# Patient Record
Sex: FEMALE | Race: WHITE | Employment: OTHER | URBAN - METROPOLITAN AREA
[De-identification: names, ages, dates, MRNs, and addresses within clinical notes are randomized per-mention and may not be internally consistent; named-entity substitution may affect disease eponyms.]

---

## 2017-07-17 PROBLEM — I50.22 CHRONIC SYSTOLIC CONGESTIVE HEART FAILURE (HCC): Status: ACTIVE | Noted: 2017-07-17

## 2017-07-17 PROBLEM — I48.0 PAROXYSMAL ATRIAL FIBRILLATION (HCC): Status: ACTIVE | Noted: 2017-07-17

## 2018-01-08 PROBLEM — R06.02 SOB (SHORTNESS OF BREATH): Status: ACTIVE | Noted: 2018-01-08

## 2018-03-20 PROBLEM — I27.20 PULMONARY HYPERTENSION (HCC): Status: ACTIVE | Noted: 2018-03-20

## 2018-03-20 PROBLEM — R60.0 BILATERAL LOWER EXTREMITY EDEMA: Status: ACTIVE | Noted: 2018-03-20

## 2018-07-24 PROBLEM — N18.30 CKD (CHRONIC KIDNEY DISEASE), STAGE III (HCC): Status: ACTIVE | Noted: 2018-07-24

## 2019-01-15 PROBLEM — I10 ESSENTIAL HYPERTENSION: Status: ACTIVE | Noted: 2019-01-15

## 2020-08-02 ENCOUNTER — HOSPITAL ENCOUNTER (EMERGENCY)
Age: 80
Discharge: HOME | End: 2020-08-02
Attending: EMERGENCY MEDICINE
Payer: MEDICARE

## 2020-08-02 VITALS
WEIGHT: 98 LBS | HEART RATE: 105 BPM | DIASTOLIC BLOOD PRESSURE: 96 MMHG | BODY MASS INDEX: 19.76 KG/M2 | HEIGHT: 59 IN | OXYGEN SATURATION: 97 % | TEMPERATURE: 99 F | RESPIRATION RATE: 16 BRPM | SYSTOLIC BLOOD PRESSURE: 155 MMHG

## 2020-08-02 LAB
ABSOLUTE EOS #: 0 K/UL (ref 0–0.4)
ABSOLUTE IMMATURE GRANULOCYTE: ABNORMAL K/UL (ref 0–0.3)
ABSOLUTE LYMPH #: 1.3 K/UL (ref 1–4.8)
ABSOLUTE MONO #: 0.7 K/UL (ref 0.1–1.2)
ANION GAP SERPL CALCULATED.3IONS-SCNC: 15 MMOL/L (ref 9–17)
BASOPHILS # BLD: 1 % (ref 0–2)
BASOPHILS ABSOLUTE: 0 K/UL (ref 0–0.2)
BUN BLDV-MCNC: 36 MG/DL (ref 8–23)
BUN/CREAT BLD: ABNORMAL (ref 9–20)
CALCIUM SERPL-MCNC: 9.6 MG/DL (ref 8.6–10.4)
CHLORIDE BLD-SCNC: 95 MMOL/L (ref 98–107)
CO2: 25 MMOL/L (ref 20–31)
CREAT SERPL-MCNC: 1.35 MG/DL (ref 0.5–0.9)
DIFFERENTIAL TYPE: ABNORMAL
EOSINOPHILS RELATIVE PERCENT: 0 % (ref 1–4)
GFR AFRICAN AMERICAN: 46 ML/MIN
GFR NON-AFRICAN AMERICAN: 38 ML/MIN
GFR SERPL CREATININE-BSD FRML MDRD: ABNORMAL ML/MIN/{1.73_M2}
GFR SERPL CREATININE-BSD FRML MDRD: ABNORMAL ML/MIN/{1.73_M2}
GLUCOSE BLD-MCNC: 101 MG/DL (ref 70–99)
HCT VFR BLD CALC: 43.6 % (ref 36–46)
HEMOGLOBIN: 14.4 G/DL (ref 12–16)
IMMATURE GRANULOCYTES: ABNORMAL %
LYMPHOCYTES # BLD: 25 % (ref 24–44)
MCH RBC QN AUTO: 29 PG (ref 26–34)
MCHC RBC AUTO-ENTMCNC: 32.9 G/DL (ref 31–37)
MCV RBC AUTO: 88.1 FL (ref 80–100)
MONOCYTES # BLD: 13 % (ref 2–11)
NRBC AUTOMATED: ABNORMAL PER 100 WBC
PDW BLD-RTO: 14.5 % (ref 12.5–15.4)
PLATELET # BLD: 132 K/UL (ref 140–450)
PLATELET ESTIMATE: ABNORMAL
PMV BLD AUTO: 9.4 FL (ref 6–12)
POTASSIUM SERPL-SCNC: 4.7 MMOL/L (ref 3.7–5.3)
RBC # BLD: 4.95 M/UL (ref 4–5.2)
RBC # BLD: ABNORMAL 10*6/UL
SEG NEUTROPHILS: 61 % (ref 36–66)
SEGMENTED NEUTROPHILS ABSOLUTE COUNT: 3.3 K/UL (ref 1.8–7.7)
SODIUM BLD-SCNC: 135 MMOL/L (ref 135–144)
WBC # BLD: 5.3 K/UL (ref 3.5–11)
WBC # BLD: ABNORMAL 10*3/UL

## 2020-08-02 PROCEDURE — 80048 BASIC METABOLIC PNL TOTAL CA: CPT

## 2020-08-02 PROCEDURE — 85025 COMPLETE CBC W/AUTO DIFF WBC: CPT

## 2020-08-02 PROCEDURE — 36415 COLL VENOUS BLD VENIPUNCTURE: CPT

## 2020-08-02 PROCEDURE — 6370000000 HC RX 637 (ALT 250 FOR IP): Performed by: PHYSICIAN ASSISTANT

## 2020-08-02 PROCEDURE — 99283 EMERGENCY DEPT VISIT LOW MDM: CPT

## 2020-08-02 RX ORDER — VALACYCLOVIR HYDROCHLORIDE 1 G/1
1000 TABLET, FILM COATED ORAL 3 TIMES DAILY
Qty: 30 TABLET | Refills: 0 | Status: SHIPPED | OUTPATIENT
Start: 2020-08-02 | End: 2020-08-12

## 2020-08-02 RX ORDER — ACYCLOVIR 200 MG/1
800 CAPSULE ORAL ONCE
Status: COMPLETED | OUTPATIENT
Start: 2020-08-02 | End: 2020-08-02

## 2020-08-02 RX ORDER — PREDNISONE 50 MG/1
50 TABLET ORAL DAILY
Qty: 5 TABLET | Refills: 0 | Status: SHIPPED | OUTPATIENT
Start: 2020-08-02 | End: 2020-08-07

## 2020-08-02 RX ADMIN — ACYCLOVIR 800 MG: 200 CAPSULE ORAL at 17:16

## 2020-08-02 ASSESSMENT — PAIN SCALES - GENERAL: PAINLEVEL_OUTOF10: 5

## 2020-08-02 ASSESSMENT — PAIN DESCRIPTION - LOCATION: LOCATION: FACE

## 2020-08-02 ASSESSMENT — PAIN DESCRIPTION - ORIENTATION: ORIENTATION: LEFT

## 2020-08-02 ASSESSMENT — PAIN DESCRIPTION - PAIN TYPE: TYPE: ACUTE PAIN

## 2020-08-02 NOTE — ED PROVIDER NOTES
[address] [facility]  [address] [facility] [address]  Phone: [phone]  Fax: [phone]      eMERGENCY dEPARTMENT eNCOUnter      Pt Name: Jennifer Pride  MRN: [id number]  [place] 1940  Date of evaluation: 8/2/20      CHIEF COMPLAINT:  Chief Complaint   Patient presents with    Facial Swelling     Left sided facial redness, swelling, discomfort x a few days. Using neosporin topically. Some blistering and seeping of serous fluid.  Cellulitis    Herpes Zoster       HISTORY OF PRESENT ILLNESS    Jennifer Pride is a 78 y.o. female who presents with rash complaint:    Location/Symptom:   Rash on face  Timing/Onset:   3 days  Context/Setting:  -pt here with son for rash/swelling/redness and pain of left chin/face and now with some left ear bleeding/pain. Rash started on left lower chin, painful and blistered. No other feelings in this area prior to onset of rash/pain. She does report having a bad tooth on this side that has been hurting as well. No recorded fevers/chills/neck pain/eye pain or visual changes. Some muffled hearing intermittently with ear symptoms onset. Quality:   pain  Duration:   constant  Modifying Factors:   none  Severity:   moderate    Nursing Notes were reviewed. REVIEW OF SYSTEMS       Constitutional: Denies recent fever, chills. Eyes: No eye pain. No vision changes. Neck: No neck pain. Respiratory: Denies recent shortness of breath. Cardiac:  Denies recent chest pain. GI:  Denies abdominal pain/nausea/vomiting/diarrhea. : Denies dysuria. Musculoskeletal: Denies focal weakness. Neurologic: denies headache or focal weakness. Skin:  Rash     Negative in 10 essential Systems except as mentioned above and in the HPI. PAST MEDICAL HISTORY   PMH:  has a past medical history of Chronic kidney disease, Gout, Gout, and Hypertension.   Surgical History:  has a past surgical history that includes Coronary artery bypass graft and Cataract (*)     Monocytes 13 (*)     Eosinophils % 0 (*)     All other components within normal limits   BASIC METABOLIC PANEL - Abnormal; Notable for the following components:    Glucose 101 (*)     BUN 36 (*)     CREATININE 1.35 (*)     Chloride 95 (*)     GFR Non- 38 (*)     GFR  46 (*)     All other components within normal limits         EMERGENCY DEPARTMENT COURSE:     1633  Suspicious for Shingles, will check basic labs to r/o any other 2* infection. Afebrile, nontoxic, no eye complaints or other airway/oral findings. Discussed with attending. No visual complaints or clinical findings. 94 20 56  Attending discharged this patient after discussing all labwork/imaging results that were finalized. Treatment plan and recommended follow-up discussed with them as well. Providing Valtrex, Prednisone and he asked for Cortisporin for the ear symptoms. I reiterated all this with pt/son and confirmed she has PCP for close outpt f/u in a few days. Return to ED for worsening symptoms, fevers/chills/eye pain or visual symptoms/nausea/vomiting/HA. Orders Placed This Encounter   Medications    acyclovir (ZOVIRAX) capsule 800 mg    valACYclovir (VALTREX) 1 g tablet     Sig: Take 1 tablet by mouth 3 times daily for 10 days     Dispense:  30 tablet     Refill:  0    neomycin-polymyxin-hydrocortisone (CORTISPORIN) 3.5-74999-9 otic solution     Sig: Place 4 drops into the left ear 3 times daily for 7 days Instill into left Ear     Dispense:  1 each     Refill:  0    predniSONE (DELTASONE) 50 MG tablet     Sig: Take 1 tablet by mouth daily for 5 days     Dispense:  5 tablet     Refill:  0       CONSULTS:  None      FINAL IMPRESSION      1. Herpes zoster without complication    2.  Acute otitis externa of left ear, unspecified type          DISPOSITION/PLAN:  DISPOSITION Decision To Discharge 08/02/2020 05:27:10 PM        PATIENT REFERRED TO:  MD Herberth Ortiz [facility] [address] [id number]  [phone]    Schedule an appointment as soon as possible for a visit in 2 days  for re-evaluation of your symptoms    [facility] ED  [address]   [address] [address]  [phone]  Schedule an appointment as soon as possible for a visit   for worsening of your symptoms      DISCHARGE MEDICATIONS:  New Prescriptions    NEOMYCIN-POLYMYXIN-HYDROCORTISONE (CORTISPORIN) 3.5-68711-7 OTIC SOLUTION    Place 4 drops into the left ear 3 times daily for 7 days Instill into left Ear    PREDNISONE (DELTASONE) 50 MG TABLET    Take 1 tablet by mouth daily for 5 days    VALACYCLOVIR (VALTREX) 1 G TABLET    Take 1 tablet by mouth 3 times daily for 10 days       (Please note that portions of this note were completed with a voice recognition program.  Efforts were made to edit the dictations but occasionally words are mis-transcribed.)    KISHA Mark PA-C  08/02/20 2187

## 2020-08-02 NOTE — ED PROVIDER NOTES
[address] [facility] ED  [address] [facility] [address]  Phone: [phone]  Fax: [phone]       Attending Physician Attestation     I performed a history and physical examination of the patient and discussed management with the mid level provideer. I reviewed the mid level provider's note and agree with the documented findings and plan of care. Any areas of disagreement are noted on the chart. I was personally present for the key portions of any procedures. I have documented in the chart those procedures where I was not present during the key portions. I have reviewed the emergency nurses triage note. I agree with the chief complaint, past medical history, past surgical history, allergies, medications, social and family history as documented unless otherwise noted below. Documentation of the HPI, Physical Exam and Medical Decision Making performed by medical students or scribes is based on my personal performance of the HPI, PE and MDM. For Physician Assistant/ Nurse Practitioner cases/documentation I have personally evaluated this patient and have completed at least one if not all key elements of the E/M (history, physical exam, and MDM). Additional findings are as noted. Primary Care Physician:  Mayte Martinez MD    CHIEF COMPLAINT       Chief Complaint   Patient presents with    Facial Swelling     Left sided facial redness, swelling, discomfort x a few days. Using neosporin topically. Some blistering and seeping of serous fluid.     Cellulitis    Herpes Zoster       RECENT VITALS:   Temp: 99 °F (37.2 °C),  Pulse: 112, Resp: 16, BP: (!) 158/104    LABS:  Labs Reviewed   CBC WITH AUTO DIFFERENTIAL - Abnormal; Notable for the following components:       Result Value    Platelets 706 (*)     Monocytes 13 (*)     Eosinophils % 0 (*)     All other components within normal limits   BASIC METABOLIC PANEL - Abnormal; Notable for the following components:    Glucose 101 (*)     BUN 36 (*) CREATININE 1.35 (*)     Chloride 95 (*)     GFR Non- 38 (*)     GFR  46 (*)     All other components within normal limits         PERTINENT ATTENDING PHYSICIAN COMMENTS:    Cindi Newberry is a 78 y.o. female who presents with a painful rash of the left face over the past 3 days. The string present. The left EAC appears affected with some discharge noted. The TM appears normal.  There is no eye involvement. He is treated with acyclovir and steroids and given pain medication and will follow-up with her primary care physician as directed. Inga Mejia D.O.        [address] [place] [address]  08/02/20 [id number]

## 2022-01-04 DIAGNOSIS — U07.1 COVID-19: ICD-10-CM

## 2022-01-04 RX ORDER — SODIUM CHLORIDE 9 MG/ML
INJECTION, SOLUTION INTRAVENOUS CONTINUOUS
OUTPATIENT
Start: 2022-01-04

## 2022-01-04 RX ORDER — SODIUM CHLORIDE 9 MG/ML
25 INJECTION, SOLUTION INTRAVENOUS PRN
OUTPATIENT
Start: 2022-01-04

## 2022-01-04 RX ORDER — EPINEPHRINE 1 MG/ML
0.3 INJECTION, SOLUTION, CONCENTRATE INTRAVENOUS PRN
OUTPATIENT
Start: 2022-01-04

## 2022-01-04 RX ORDER — ONDANSETRON 2 MG/ML
8 INJECTION INTRAMUSCULAR; INTRAVENOUS
OUTPATIENT
Start: 2022-01-04

## 2022-01-04 RX ORDER — HEPARIN SODIUM (PORCINE) LOCK FLUSH IV SOLN 100 UNIT/ML 100 UNIT/ML
500 SOLUTION INTRAVENOUS PRN
OUTPATIENT
Start: 2022-01-04

## 2022-01-04 RX ORDER — ACETAMINOPHEN 325 MG/1
650 TABLET ORAL
OUTPATIENT
Start: 2022-01-04

## 2022-01-04 RX ORDER — DIPHENHYDRAMINE HYDROCHLORIDE 50 MG/ML
50 INJECTION INTRAMUSCULAR; INTRAVENOUS
OUTPATIENT
Start: 2022-01-04

## 2022-01-04 RX ORDER — ALBUTEROL SULFATE 90 UG/1
4 AEROSOL, METERED RESPIRATORY (INHALATION) PRN
OUTPATIENT
Start: 2022-01-04

## 2022-01-04 RX ORDER — SODIUM CHLORIDE 0.9 % (FLUSH) 0.9 %
5-40 SYRINGE (ML) INJECTION PRN
OUTPATIENT
Start: 2022-01-04

## 2022-01-08 ENCOUNTER — HOSPITAL ENCOUNTER (OUTPATIENT)
Dept: INFUSION THERAPY | Age: 82
Setting detail: INFUSION SERIES
Discharge: HOME | End: 2022-01-08
Attending: INTERNAL MEDICINE
Payer: COMMERCIAL

## 2022-01-08 VITALS — OXYGEN SATURATION: 99 % | HEART RATE: 124 BPM | RESPIRATION RATE: 15 BRPM

## 2022-01-08 DIAGNOSIS — U07.1 COVID-19: Primary | ICD-10-CM

## 2022-01-08 PROCEDURE — 2580000003 HC RX 258: Performed by: PHYSICIAN ASSISTANT

## 2022-01-08 PROCEDURE — 6360000002 HC RX W HCPCS: Performed by: PHYSICIAN ASSISTANT

## 2022-01-08 PROCEDURE — 96365 THER/PROPH/DIAG IV INF INIT: CPT

## 2022-01-08 PROCEDURE — 2500000003 HC RX 250 WO HCPCS: Performed by: PHYSICIAN ASSISTANT

## 2022-01-08 RX ORDER — SODIUM CHLORIDE 9 MG/ML
25 INJECTION, SOLUTION INTRAVENOUS PRN
OUTPATIENT
Start: 2022-01-08

## 2022-01-08 RX ORDER — SODIUM CHLORIDE 9 MG/ML
INJECTION, SOLUTION INTRAVENOUS CONTINUOUS
OUTPATIENT
Start: 2022-01-08

## 2022-01-08 RX ORDER — EPINEPHRINE 1 MG/ML
0.3 INJECTION, SOLUTION, CONCENTRATE INTRAVENOUS PRN
OUTPATIENT
Start: 2022-01-08

## 2022-01-08 RX ORDER — ALBUTEROL SULFATE 90 UG/1
4 AEROSOL, METERED RESPIRATORY (INHALATION) PRN
OUTPATIENT
Start: 2022-01-08

## 2022-01-08 RX ORDER — HEPARIN SODIUM (PORCINE) LOCK FLUSH IV SOLN 100 UNIT/ML 100 UNIT/ML
500 SOLUTION INTRAVENOUS PRN
OUTPATIENT
Start: 2022-01-08

## 2022-01-08 RX ORDER — ONDANSETRON 2 MG/ML
8 INJECTION INTRAMUSCULAR; INTRAVENOUS
OUTPATIENT
Start: 2022-01-08

## 2022-01-08 RX ORDER — SODIUM CHLORIDE 0.9 % (FLUSH) 0.9 %
5-40 SYRINGE (ML) INJECTION PRN
OUTPATIENT
Start: 2022-01-08

## 2022-01-08 RX ORDER — DIPHENHYDRAMINE HYDROCHLORIDE 50 MG/ML
50 INJECTION INTRAMUSCULAR; INTRAVENOUS
OUTPATIENT
Start: 2022-01-08

## 2022-01-08 RX ORDER — ACETAMINOPHEN 325 MG/1
650 TABLET ORAL
OUTPATIENT
Start: 2022-01-08

## 2022-01-08 RX ADMIN — SODIUM CHLORIDE: 9 INJECTION, SOLUTION INTRAVENOUS at 13:07

## 2022-07-15 ENCOUNTER — HOSPITAL ENCOUNTER (OUTPATIENT)
Dept: ULTRASOUND IMAGING | Facility: CLINIC | Age: 82
Discharge: HOME | End: 2022-07-17
Payer: COMMERCIAL

## 2022-07-15 DIAGNOSIS — N28.1 RENAL CYST: ICD-10-CM

## 2022-07-15 PROCEDURE — 76770 US EXAM ABDO BACK WALL COMP: CPT

## 2023-05-19 ENCOUNTER — HOSPITAL ENCOUNTER (OUTPATIENT)
Age: 83
Setting detail: SPECIMEN
Discharge: HOME | End: 2023-05-19

## 2023-05-19 DIAGNOSIS — I27.20 PULMONARY HYPERTENSION (HCC): ICD-10-CM

## 2023-05-19 DIAGNOSIS — I48.0 PAROXYSMAL ATRIAL FIBRILLATION (HCC): ICD-10-CM

## 2023-05-19 DIAGNOSIS — I10 ESSENTIAL HYPERTENSION: ICD-10-CM

## 2023-05-19 DIAGNOSIS — N18.32 STAGE 3B CHRONIC KIDNEY DISEASE (HCC): ICD-10-CM

## 2023-05-19 DIAGNOSIS — R60.0 BILATERAL LOWER EXTREMITY EDEMA: ICD-10-CM

## 2023-05-19 LAB
25(OH)D3 SERPL-MCNC: 35.4 NG/ML
ANION GAP SERPL CALCULATED.3IONS-SCNC: 9 MMOL/L (ref 9–17)
BASOPHILS # BLD: 0.04 K/UL (ref 0–0.2)
BASOPHILS NFR BLD: 1 % (ref 0–2)
BUN SERPL-MCNC: 39 MG/DL (ref 8–23)
CALCIUM SERPL-MCNC: 9.3 MG/DL (ref 8.6–10.4)
CHLORIDE SERPL-SCNC: 101 MMOL/L (ref 98–107)
CO2 SERPL-SCNC: 28 MMOL/L (ref 20–31)
CREAT SERPL-MCNC: 1.07 MG/DL (ref 0.5–0.9)
EOSINOPHIL # BLD: 0.05 K/UL (ref 0–0.44)
EOSINOPHILS RELATIVE PERCENT: 1 % (ref 1–4)
ERYTHROCYTE [DISTWIDTH] IN BLOOD BY AUTOMATED COUNT: 13.6 % (ref 11.8–14.4)
GFR SERPL CREATININE-BSD FRML MDRD: 51 ML/MIN/1.73M2
GLUCOSE SERPL-MCNC: 104 MG/DL (ref 70–99)
HCT VFR BLD AUTO: 36.9 % (ref 36.3–47.1)
HGB BLD-MCNC: 11.7 G/DL (ref 11.9–15.1)
IMM GRANULOCYTES # BLD AUTO: <0.03 K/UL (ref 0–0.3)
IMM GRANULOCYTES NFR BLD: 0 %
LYMPHOCYTES # BLD: 29 % (ref 24–43)
LYMPHOCYTES NFR BLD: 1.49 K/UL (ref 1.1–3.7)
MAGNESIUM SERPL-MCNC: 2.4 MG/DL (ref 1.6–2.6)
MCH RBC QN AUTO: 30 PG (ref 25.2–33.5)
MCHC RBC AUTO-ENTMCNC: 31.7 G/DL (ref 28.4–34.8)
MCV RBC AUTO: 94.6 FL (ref 82.6–102.9)
MONOCYTES NFR BLD: 0.38 K/UL (ref 0.1–1.2)
MONOCYTES NFR BLD: 8 % (ref 3–12)
NEUTROPHILS NFR BLD: 61 % (ref 36–65)
NEUTS SEG NFR BLD: 3.11 K/UL (ref 1.5–8.1)
NRBC AUTOMATED: 0 PER 100 WBC
PHOSPHATE SERPL-MCNC: 3.4 MG/DL (ref 2.6–4.5)
PLATELET # BLD AUTO: ABNORMAL K/UL (ref 138–453)
PLATELET, FLUORESCENCE: 167 K/UL (ref 138–453)
PLATELETS.RETICULATED NFR BLD AUTO: 4.2 % (ref 1.1–10.3)
POTASSIUM SERPL-SCNC: 5.1 MMOL/L (ref 3.7–5.3)
PTH-INTACT SERPL-MCNC: 43 PG/ML (ref 14–72)
RBC # BLD AUTO: 3.9 M/UL (ref 3.95–5.11)
SODIUM SERPL-SCNC: 138 MMOL/L (ref 135–144)
WBC OTHER # BLD: 5.1 K/UL (ref 3.5–11.3)

## 2023-05-20 LAB
CREAT UR-MCNC: 33.7 MG/DL (ref 28–217)
TOTAL PROTEIN, URINE: 6 MG/DL

## 2023-09-28 ENCOUNTER — OFFICE VISIT (OUTPATIENT)
Age: 83
End: 2023-09-28

## 2023-09-28 ENCOUNTER — HOSPITAL ENCOUNTER (OUTPATIENT)
Age: 83
Setting detail: SPECIMEN
Discharge: HOME | End: 2023-09-28

## 2023-09-28 VITALS
DIASTOLIC BLOOD PRESSURE: 72 MMHG | BODY MASS INDEX: 18.87 KG/M2 | SYSTOLIC BLOOD PRESSURE: 120 MMHG | HEIGHT: 59 IN | RESPIRATION RATE: 16 BRPM | HEART RATE: 77 BPM | WEIGHT: 93.6 LBS | TEMPERATURE: 97.8 F | OXYGEN SATURATION: 97 %

## 2023-09-28 DIAGNOSIS — N18.32 STAGE 3B CHRONIC KIDNEY DISEASE (HCC): Primary | ICD-10-CM

## 2023-09-28 DIAGNOSIS — I35.0 NONRHEUMATIC AORTIC VALVE STENOSIS: ICD-10-CM

## 2023-09-28 DIAGNOSIS — N18.32 STAGE 3B CHRONIC KIDNEY DISEASE (HCC): ICD-10-CM

## 2023-09-28 DIAGNOSIS — M85.80 OSTEOPENIA, UNSPECIFIED LOCATION: ICD-10-CM

## 2023-09-28 DIAGNOSIS — Z79.01 LONG TERM (CURRENT) USE OF ANTICOAGULANTS: ICD-10-CM

## 2023-09-28 DIAGNOSIS — M10.9 GOUT, UNSPECIFIED CAUSE, UNSPECIFIED CHRONICITY, UNSPECIFIED SITE: ICD-10-CM

## 2023-09-28 DIAGNOSIS — I35.1 NONRHEUMATIC AORTIC VALVE INSUFFICIENCY: ICD-10-CM

## 2023-09-28 DIAGNOSIS — Z95.3 HISTORY OF MITRAL VALVE REPLACEMENT WITH BIOPROSTHETIC VALVE: ICD-10-CM

## 2023-09-28 DIAGNOSIS — K55.1 SUPERIOR MESENTERIC ARTERY SYNDROME (HCC): ICD-10-CM

## 2023-09-28 DIAGNOSIS — I10 ESSENTIAL HYPERTENSION: ICD-10-CM

## 2023-09-28 DIAGNOSIS — Z91.89 HIGH RISK FOR HIP FRACTURE: ICD-10-CM

## 2023-09-28 DIAGNOSIS — I48.0 PAROXYSMAL ATRIAL FIBRILLATION (HCC): ICD-10-CM

## 2023-09-28 DIAGNOSIS — D69.6 THROMBOCYTOPENIA (HCC): ICD-10-CM

## 2023-09-28 DIAGNOSIS — M51.9 LUMBAR DISC DISEASE: Primary | ICD-10-CM

## 2023-09-28 DIAGNOSIS — E55.9 VITAMIN D DEFICIENCY: ICD-10-CM

## 2023-09-28 DIAGNOSIS — I50.22 CHRONIC SYSTOLIC CONGESTIVE HEART FAILURE (HCC): ICD-10-CM

## 2023-09-28 PROBLEM — M54.9 BACK PAIN: Status: ACTIVE | Noted: 2022-11-04

## 2023-09-28 PROBLEM — R00.2 PALPITATIONS: Status: ACTIVE | Noted: 2022-11-02

## 2023-09-28 PROBLEM — Z51.81 ENCOUNTER FOR THERAPEUTIC DRUG LEVEL MONITORING: Status: ACTIVE | Noted: 2022-08-16

## 2023-09-28 PROBLEM — N28.1 RENAL CYST: Status: ACTIVE | Noted: 2023-05-18

## 2023-09-28 PROBLEM — R60.0 LOCALIZED EDEMA: Status: ACTIVE | Noted: 2022-10-28

## 2023-09-28 PROBLEM — R00.0 TACHYCARDIA: Status: ACTIVE | Noted: 2023-05-18

## 2023-09-28 PROBLEM — R10.11 RIGHT UPPER QUADRANT PAIN: Status: ACTIVE | Noted: 2022-11-04

## 2023-09-28 PROBLEM — Z12.11 ENCOUNTER FOR SCREENING FOR MALIGNANT NEOPLASM OF COLON: Status: ACTIVE | Noted: 2022-11-04

## 2023-09-28 PROBLEM — I50.9 CHF (CONGESTIVE HEART FAILURE) (HCC): Status: ACTIVE | Noted: 2023-05-18

## 2023-09-28 PROBLEM — I38 HEART VALVE DISORDER: Status: ACTIVE | Noted: 2023-05-18

## 2023-09-28 PROBLEM — R10.9 ABDOMINAL PAIN: Status: ACTIVE | Noted: 2023-05-18

## 2023-09-28 LAB
ALBUMIN SERPL-MCNC: 3.8 G/DL (ref 3.5–5.2)
ALBUMIN/GLOB SERPL: 1.4 {RATIO} (ref 1–2.5)
ALP SERPL-CCNC: 93 U/L (ref 35–104)
ALT SERPL-CCNC: 18 U/L (ref 5–33)
ANION GAP SERPL CALCULATED.3IONS-SCNC: 9 MMOL/L (ref 9–17)
AST SERPL-CCNC: 25 U/L
BASOPHILS # BLD: 0.03 K/UL (ref 0–0.2)
BASOPHILS NFR BLD: 1 % (ref 0–2)
BILIRUB SERPL-MCNC: 0.4 MG/DL (ref 0.3–1.2)
BUN SERPL-MCNC: 39 MG/DL (ref 8–23)
CALCIUM SERPL-MCNC: 8.9 MG/DL (ref 8.6–10.4)
CHLORIDE SERPL-SCNC: 102 MMOL/L (ref 98–107)
CHOLEST SERPL-MCNC: 201 MG/DL
CHOLESTEROL/HDL RATIO: 3
CO2 SERPL-SCNC: 29 MMOL/L (ref 20–31)
CREAT SERPL-MCNC: 1.1 MG/DL (ref 0.5–0.9)
EOSINOPHIL # BLD: 0.06 K/UL (ref 0–0.44)
EOSINOPHILS RELATIVE PERCENT: 1 % (ref 1–4)
ERYTHROCYTE [DISTWIDTH] IN BLOOD BY AUTOMATED COUNT: 13.3 % (ref 11.8–14.4)
GFR SERPL CREATININE-BSD FRML MDRD: 50 ML/MIN/1.73M2
GLUCOSE SERPL-MCNC: 65 MG/DL (ref 70–99)
HCT VFR BLD AUTO: 38.6 % (ref 36.3–47.1)
HDLC SERPL-MCNC: 68 MG/DL
HGB BLD-MCNC: 12.4 G/DL (ref 11.9–15.1)
IMM GRANULOCYTES # BLD AUTO: <0.03 K/UL (ref 0–0.3)
IMM GRANULOCYTES NFR BLD: 0 %
LDLC SERPL CALC-MCNC: 103 MG/DL (ref 0–130)
LYMPHOCYTES NFR BLD: 1.41 K/UL (ref 1.1–3.7)
LYMPHOCYTES RELATIVE PERCENT: 24 % (ref 24–43)
MAGNESIUM SERPL-MCNC: 2.6 MG/DL (ref 1.6–2.6)
MCH RBC QN AUTO: 31.2 PG (ref 25.2–33.5)
MCHC RBC AUTO-ENTMCNC: 32.1 G/DL (ref 28.4–34.8)
MCV RBC AUTO: 97.2 FL (ref 82.6–102.9)
MONOCYTES NFR BLD: 0.45 K/UL (ref 0.1–1.2)
MONOCYTES NFR BLD: 8 % (ref 3–12)
NEUTROPHILS NFR BLD: 66 % (ref 36–65)
NEUTS SEG NFR BLD: 3.82 K/UL (ref 1.5–8.1)
NRBC BLD-RTO: 0 PER 100 WBC
PLATELET # BLD AUTO: ABNORMAL K/UL (ref 138–453)
PLATELET, FLUORESCENCE: 143 K/UL (ref 138–453)
PLATELETS.RETICULATED NFR BLD AUTO: 6 % (ref 1.1–10.3)
POTASSIUM SERPL-SCNC: 5.2 MMOL/L (ref 3.7–5.3)
PROT SERPL-MCNC: 6.6 G/DL (ref 6.4–8.3)
RBC # BLD AUTO: 3.97 M/UL (ref 3.95–5.11)
SODIUM SERPL-SCNC: 140 MMOL/L (ref 135–144)
T4 FREE SERPL-MCNC: 0.9 NG/DL (ref 0.9–1.7)
TRIGL SERPL-MCNC: 148 MG/DL
TSH SERPL DL<=0.05 MIU/L-ACNC: 2.69 UIU/ML (ref 0.3–5)
URATE SERPL-MCNC: 6.2 MG/DL (ref 2.4–5.7)
WBC OTHER # BLD: 5.8 K/UL (ref 3.5–11.3)

## 2023-09-28 SDOH — ECONOMIC STABILITY: FOOD INSECURITY: WITHIN THE PAST 12 MONTHS, THE FOOD YOU BOUGHT JUST DIDN'T LAST AND YOU DIDN'T HAVE MONEY TO GET MORE.: NEVER TRUE

## 2023-09-28 SDOH — ECONOMIC STABILITY: INCOME INSECURITY: HOW HARD IS IT FOR YOU TO PAY FOR THE VERY BASICS LIKE FOOD, HOUSING, MEDICAL CARE, AND HEATING?: NOT HARD AT ALL

## 2023-09-28 SDOH — ECONOMIC STABILITY: FOOD INSECURITY: WITHIN THE PAST 12 MONTHS, YOU WORRIED THAT YOUR FOOD WOULD RUN OUT BEFORE YOU GOT MONEY TO BUY MORE.: NEVER TRUE

## 2023-09-28 SDOH — ECONOMIC STABILITY: HOUSING INSECURITY
IN THE LAST 12 MONTHS, WAS THERE A TIME WHEN YOU DID NOT HAVE A STEADY PLACE TO SLEEP OR SLEPT IN A SHELTER (INCLUDING NOW)?: NO

## 2023-09-28 ASSESSMENT — PATIENT HEALTH QUESTIONNAIRE - PHQ9
SUM OF ALL RESPONSES TO PHQ9 QUESTIONS 1 & 2: 0
SUM OF ALL RESPONSES TO PHQ QUESTIONS 1-9: 0
1. LITTLE INTEREST OR PLEASURE IN DOING THINGS: 0
SUM OF ALL RESPONSES TO PHQ QUESTIONS 1-9: 0
2. FEELING DOWN, DEPRESSED OR HOPELESS: 0

## 2023-09-28 NOTE — PROGRESS NOTES
[address] [address]     Date of Visit:  2023  Patient Name: Clinton Leija   Patient :  1940     CHIEF COMPLAINT/HPI:     Chief Complaint   Patient presents with    Check-Up     Patient is here for 6 month check up. No concerns today        HPI      Clinton Leija, 78 y.o. presents today for 6 month follow-up of hypertension, heart failure, and atrial fibrillation. Today, patient reports that she is doing well. She has been exercising at the [facility], partaking in [employer] and [employer]. Patient has had no abdominal pain. She stopped Allopurinol because she did not want to be on another medication. Patient refused any blood pressure medication, states her blood pressure has been WNL at home. She follows with Dr. Boby La at [facility] in cardiology. Denies having followed with dentistry as previously requested. Declined influenza vaccine. She lives with her son. Note from 2023:     HPI:     General:          Here for evaluation of BP at request of her cardiologist. She states her BP was high at his office. She does have a history of HTN, heart failure and atrial fibrillation. She isn't currently on medication due to side effects. She had bradycardia from metoprolol and ca channel blockers. There was some talk of a pacemaker at one point. She had hyponatremia from diuretics and she isn't on Ace or Arb at this time due to CKD. She tells me she has been feeling great. She denies CP/SOB. No N/V/D. She is exercsing at the [facility]. No dizziness, snycope. No further abdominal pain. She has a history of EF 45%, dilated RV, mild AVR, biprosthetic MV and severe TR. She thinks her BP was up due to traffic. She hasn't been monitoring her BP at home,. Plan:  1. HTN (hypertension)        Notes: instructed to check her BP at home. 2. Stage 3b chronic kidney disease        Notes: stable.     3.

## 2023-10-03 ENCOUNTER — TELEPHONE (OUTPATIENT)
Age: 83
End: 2023-10-03

## 2023-10-03 NOTE — TELEPHONE ENCOUNTER
Patient states that she had a missed call from our office and a nurse left her a message to call back about a lab, I do not see anything in her chart, Please advise

## 2023-10-28 PROBLEM — Z12.11 ENCOUNTER FOR SCREENING FOR MALIGNANT NEOPLASM OF COLON: Status: RESOLVED | Noted: 2022-11-04 | Resolved: 2023-10-28

## 2023-11-15 ENCOUNTER — HOSPITAL ENCOUNTER (OUTPATIENT)
Age: 83
Setting detail: SPECIMEN
Discharge: HOME | End: 2023-11-15

## 2023-11-15 DIAGNOSIS — N28.1 RENAL CYST: ICD-10-CM

## 2023-11-15 DIAGNOSIS — N18.31 STAGE 3A CHRONIC KIDNEY DISEASE (HCC): ICD-10-CM

## 2023-11-15 DIAGNOSIS — R60.0 BILATERAL LOWER EXTREMITY EDEMA: ICD-10-CM

## 2023-11-15 LAB — MAGNESIUM SERPL-MCNC: 2.6 MG/DL (ref 1.6–2.4)

## 2024-03-28 ENCOUNTER — OFFICE VISIT (OUTPATIENT)
Age: 84
End: 2024-03-28
Payer: MEDICARE

## 2024-03-28 VITALS
BODY MASS INDEX: 18.95 KG/M2 | HEART RATE: 74 BPM | DIASTOLIC BLOOD PRESSURE: 70 MMHG | SYSTOLIC BLOOD PRESSURE: 118 MMHG | OXYGEN SATURATION: 98 % | WEIGHT: 94 LBS | HEIGHT: 59 IN

## 2024-03-28 DIAGNOSIS — M85.80 OSTEOPENIA WITH HIGH RISK OF FRACTURE: ICD-10-CM

## 2024-03-28 DIAGNOSIS — M10.9 GOUT, UNSPECIFIED CAUSE, UNSPECIFIED CHRONICITY, UNSPECIFIED SITE: ICD-10-CM

## 2024-03-28 DIAGNOSIS — Z95.3 HISTORY OF MITRAL VALVE REPLACEMENT WITH BIOPROSTHETIC VALVE: ICD-10-CM

## 2024-03-28 DIAGNOSIS — I35.0 NONRHEUMATIC AORTIC VALVE STENOSIS: ICD-10-CM

## 2024-03-28 DIAGNOSIS — I10 ESSENTIAL HYPERTENSION: ICD-10-CM

## 2024-03-28 DIAGNOSIS — Z00.00 MEDICARE ANNUAL WELLNESS VISIT, SUBSEQUENT: Primary | ICD-10-CM

## 2024-03-28 DIAGNOSIS — I48.0 PAROXYSMAL ATRIAL FIBRILLATION (HCC): ICD-10-CM

## 2024-03-28 DIAGNOSIS — K55.1 SUPERIOR MESENTERIC ARTERY SYNDROME (HCC): ICD-10-CM

## 2024-03-28 DIAGNOSIS — N18.32 STAGE 3B CHRONIC KIDNEY DISEASE (HCC): ICD-10-CM

## 2024-03-28 PROCEDURE — G8427 DOCREV CUR MEDS BY ELIG CLIN: HCPCS | Performed by: FAMILY MEDICINE

## 2024-03-28 PROCEDURE — 1090F PRES/ABSN URINE INCON ASSESS: CPT | Performed by: FAMILY MEDICINE

## 2024-03-28 PROCEDURE — G8484 FLU IMMUNIZE NO ADMIN: HCPCS | Performed by: FAMILY MEDICINE

## 2024-03-28 PROCEDURE — G8420 CALC BMI NORM PARAMETERS: HCPCS | Performed by: FAMILY MEDICINE

## 2024-03-28 PROCEDURE — G8400 PT W/DXA NO RESULTS DOC: HCPCS | Performed by: FAMILY MEDICINE

## 2024-03-28 PROCEDURE — 1036F TOBACCO NON-USER: CPT | Performed by: FAMILY MEDICINE

## 2024-03-28 PROCEDURE — 1123F ACP DISCUSS/DSCN MKR DOCD: CPT | Performed by: FAMILY MEDICINE

## 2024-03-28 PROCEDURE — 99214 OFFICE O/P EST MOD 30 MIN: CPT | Performed by: FAMILY MEDICINE

## 2024-03-28 PROCEDURE — 3078F DIAST BP <80 MM HG: CPT | Performed by: FAMILY MEDICINE

## 2024-03-28 PROCEDURE — G0439 PPPS, SUBSEQ VISIT: HCPCS | Performed by: FAMILY MEDICINE

## 2024-03-28 PROCEDURE — 3074F SYST BP LT 130 MM HG: CPT | Performed by: FAMILY MEDICINE

## 2024-03-28 ASSESSMENT — PATIENT HEALTH QUESTIONNAIRE - PHQ9
2. FEELING DOWN, DEPRESSED OR HOPELESS: NOT AT ALL
SUM OF ALL RESPONSES TO PHQ9 QUESTIONS 1 & 2: 0
SUM OF ALL RESPONSES TO PHQ QUESTIONS 1-9: 0
1. LITTLE INTEREST OR PLEASURE IN DOING THINGS: NOT AT ALL
SUM OF ALL RESPONSES TO PHQ QUESTIONS 1-9: 0

## 2024-03-28 ASSESSMENT — LIFESTYLE VARIABLES
HOW MANY STANDARD DRINKS CONTAINING ALCOHOL DO YOU HAVE ON A TYPICAL DAY: PATIENT DOES NOT DRINK
HOW OFTEN DO YOU HAVE A DRINK CONTAINING ALCOHOL: NEVER

## 2024-03-28 NOTE — PROGRESS NOTES
Medicare Annual Wellness Visit    Tracee Riggins is here for Medicare AWV and Chronic Disease Management    Assessment & Plan   Medicare annual wellness visit, subsequent  Recommendations for Preventive Services Due: see orders and patient instructions/AVS.  Recommended screening schedule for the next 5-10 years is provided to the patient in written form: see Patient Instructions/AVS.     No follow-ups on file.     Subjective       Patient's complete Health Risk Assessment and screening values have been reviewed and are found in Flowsheets. The following problems were reviewed today and where indicated follow up appointments were made and/or referrals ordered.    No Positive Risk Factors identified today.                     Dentist Screen:  Have you seen the dentist within the past year?: (!) No    Intervention:  Advised to schedule with their dentist                Objective   Vitals:    03/28/24 1109   BP: 118/70   Pulse: 74   SpO2: 98%   Weight: 42.6 kg (94 lb)   Height: 1.499 m (4' 11\")      Body mass index is 18.99 kg/m².               No Known Allergies  Prior to Visit Medications    Medication Sig Taking? Authorizing Provider   Magnesium 125 MG CAPS Take by mouth Yes ProviderJuliana MD   warfarin (COUMADIN) 1 MG tablet Take 2 tablets by mouth AS DIRECTED BY DR LYNCH WHO FOLLOWS HER PT/INR Yes ProviderJuliana MD       CareTeam (Including outside providers/suppliers regularly involved in providing care):   Patient Care Team:  Beverly Ventura DO as PCP - General (Family Medicine)  Beverly Ventura DO as PCP - Empaneled Provider  Venkata Farmer MD (Cardiology)     Reviewed and updated this visit:  Tobacco  Allergies  Meds  Med Hx  Surg Hx  Soc Hx  Fam Hx         DNR-CCA  
05/2020    Hypertension     Lumbar disc disease     Thrombocytopenia (HCC)     Vitamin D deficiency        Past Surgical History:   Procedure Laterality Date    CABG WITH MITRAL VALVE REPLACEMENT      CATARACT REMOVAL          Social History     Socioeconomic History    Marital status:      Spouse name: None    Number of children: None    Years of education: None    Highest education level: None   Tobacco Use    Smoking status: Never    Smokeless tobacco: Never   Vaping Use    Vaping Use: Never used   Substance and Sexual Activity    Alcohol use: No    Drug use: No    Sexual activity: Defer     Social Determinants of Health     Financial Resource Strain: Low Risk  (9/28/2023)    Overall Financial Resource Strain (CARDIA)     Difficulty of Paying Living Expenses: Not hard at all   Transportation Needs: Unknown (9/28/2023)    PRAPARE - Transportation     Lack of Transportation (Non-Medical): No   Physical Activity: Sufficiently Active (3/28/2024)    Exercise Vital Sign     Days of Exercise per Week: 5 days     Minutes of Exercise per Session: 30 min   Housing Stability: Unknown (9/28/2023)    Housing Stability Vital Sign     Unstable Housing in the Last Year: No        PHYSICAL EXAM     /70   Pulse 74   Ht 1.499 m (4' 11\")   Wt 42.6 kg (94 lb)   SpO2 98%   BMI 18.99 kg/m²      General:A & O x3, No acute distress  HEENT:  NC/AT, PERRL, EOMI, TM clear bilaterally, no pharyngeal erythema, no mass palpated on neck exam  Lymph Nodes:  There is no lymphadenopathy in the cervical, supraclavicular, infraclavicular  Heart: S1+S2, no murmurs, RRR, no carotid bruits  Lungs: clear to auscultation without wheezes or rales  Abdomen: soft, nontender, no guarding, no rebound, no masses, or hernias  Extremity: Normal, without deformities, edema, or skin discoloration  SKIN: Skin color, texture, turgor normal. No rashes or lesions.  Psych:  Mood and affect normal    PREVENTATIVE CARE     Mammogram: 02/15/2022   DEXA:

## 2024-05-20 ENCOUNTER — HOSPITAL ENCOUNTER (OUTPATIENT)
Age: 84
Setting detail: SPECIMEN
Discharge: HOME | End: 2024-05-20

## 2024-05-20 DIAGNOSIS — N28.1 RENAL CYST: ICD-10-CM

## 2024-05-20 DIAGNOSIS — N18.31 STAGE 3A CHRONIC KIDNEY DISEASE (HCC): ICD-10-CM

## 2024-05-20 DIAGNOSIS — R60.0 BILATERAL LOWER EXTREMITY EDEMA: ICD-10-CM

## 2024-05-20 LAB
ANION GAP SERPL CALCULATED.3IONS-SCNC: 6 MMOL/L (ref 9–16)
BASOPHILS # BLD: 0.03 K/UL (ref 0–0.2)
BASOPHILS NFR BLD: 1 % (ref 0–2)
BUN SERPL-MCNC: 37 MG/DL (ref 8–23)
CALCIUM SERPL-MCNC: 8.8 MG/DL (ref 8.6–10.4)
CHLORIDE SERPL-SCNC: 105 MMOL/L (ref 98–107)
CO2 SERPL-SCNC: 28 MMOL/L (ref 20–31)
CREAT SERPL-MCNC: 1.2 MG/DL (ref 0.5–0.9)
EOSINOPHIL # BLD: 0.05 K/UL (ref 0–0.44)
EOSINOPHILS RELATIVE PERCENT: 1 % (ref 1–4)
ERYTHROCYTE [DISTWIDTH] IN BLOOD BY AUTOMATED COUNT: 13.9 % (ref 11.8–14.4)
GFR, ESTIMATED: 43 ML/MIN/1.73M2
GLUCOSE SERPL-MCNC: 85 MG/DL (ref 74–99)
HCT VFR BLD AUTO: 37.9 % (ref 36.3–47.1)
HGB BLD-MCNC: 12.1 G/DL (ref 11.9–15.1)
IMM GRANULOCYTES # BLD AUTO: <0.03 K/UL (ref 0–0.3)
IMM GRANULOCYTES NFR BLD: 0 %
LYMPHOCYTES NFR BLD: 1.11 K/UL (ref 1.1–3.7)
LYMPHOCYTES RELATIVE PERCENT: 18 % (ref 24–43)
MCH RBC QN AUTO: 30.4 PG (ref 25.2–33.5)
MCHC RBC AUTO-ENTMCNC: 31.9 G/DL (ref 28.4–34.8)
MCV RBC AUTO: 95.2 FL (ref 82.6–102.9)
MONOCYTES NFR BLD: 0.41 K/UL (ref 0.1–1.2)
MONOCYTES NFR BLD: 7 % (ref 3–12)
NEUTROPHILS NFR BLD: 73 % (ref 36–65)
NEUTS SEG NFR BLD: 4.55 K/UL (ref 1.5–8.1)
NRBC BLD-RTO: 0 PER 100 WBC
PLATELET # BLD AUTO: 138 K/UL (ref 138–453)
PMV BLD AUTO: 12.8 FL (ref 8.1–13.5)
POTASSIUM SERPL-SCNC: 5.7 MMOL/L (ref 3.7–5.3)
RBC # BLD AUTO: 3.98 M/UL (ref 3.95–5.11)
SODIUM SERPL-SCNC: 139 MMOL/L (ref 136–145)
TOTAL PROTEIN, URINE: 9 MG/DL
WBC OTHER # BLD: 6.2 K/UL (ref 3.5–11.3)

## 2024-05-28 ENCOUNTER — HOSPITAL ENCOUNTER (OUTPATIENT)
Age: 84
Setting detail: SPECIMEN
Discharge: HOME | End: 2024-05-28

## 2024-05-28 DIAGNOSIS — N18.31 STAGE 3A CHRONIC KIDNEY DISEASE (HCC): ICD-10-CM

## 2024-05-28 DIAGNOSIS — E87.5 HYPERKALEMIA: ICD-10-CM

## 2024-05-28 LAB
ANION GAP SERPL CALCULATED.3IONS-SCNC: 2 MMOL/L (ref 9–16)
BUN SERPL-MCNC: 30 MG/DL (ref 8–23)
CALCIUM SERPL-MCNC: 8.6 MG/DL (ref 8.6–10.4)
CHLORIDE SERPL-SCNC: 105 MMOL/L (ref 98–107)
CO2 SERPL-SCNC: 35 MMOL/L (ref 20–31)
CREAT SERPL-MCNC: 1.1 MG/DL (ref 0.5–0.9)
GFR, ESTIMATED: 48 ML/MIN/1.73M2
GLUCOSE SERPL-MCNC: 86 MG/DL (ref 74–99)
POTASSIUM SERPL-SCNC: 5.6 MMOL/L (ref 3.7–5.3)
SODIUM SERPL-SCNC: 142 MMOL/L (ref 136–145)

## 2024-06-04 ENCOUNTER — HOSPITAL ENCOUNTER (OUTPATIENT)
Age: 84
Setting detail: SPECIMEN
Discharge: HOME | End: 2024-06-04

## 2024-06-04 DIAGNOSIS — N18.31 STAGE 3A CHRONIC KIDNEY DISEASE (HCC): ICD-10-CM

## 2024-06-04 LAB
ANION GAP SERPL CALCULATED.3IONS-SCNC: 9 MMOL/L (ref 9–16)
BUN SERPL-MCNC: 39 MG/DL (ref 8–23)
CALCIUM SERPL-MCNC: 8.4 MG/DL (ref 8.6–10.4)
CHLORIDE SERPL-SCNC: 104 MMOL/L (ref 98–107)
CO2 SERPL-SCNC: 27 MMOL/L (ref 20–31)
CREAT SERPL-MCNC: 1.2 MG/DL (ref 0.5–0.9)
GFR, ESTIMATED: 44 ML/MIN/1.73M2
GLUCOSE SERPL-MCNC: 80 MG/DL (ref 74–99)
POTASSIUM SERPL-SCNC: 3.9 MMOL/L (ref 3.7–5.3)
SODIUM SERPL-SCNC: 140 MMOL/L (ref 136–145)

## 2024-08-29 ENCOUNTER — HOSPITAL ENCOUNTER (INPATIENT)
Age: 84
LOS: 2 days | Discharge: HOME | DRG: 308 | End: 2024-09-01
Attending: SPECIALIST | Admitting: FAMILY MEDICINE
Payer: MEDICARE

## 2024-08-29 DIAGNOSIS — I48.92 ATRIAL FLUTTER WITH RAPID VENTRICULAR RESPONSE (HCC): Primary | ICD-10-CM

## 2024-08-29 DIAGNOSIS — I50.9 CONGESTIVE HEART FAILURE, UNSPECIFIED HF CHRONICITY, UNSPECIFIED HEART FAILURE TYPE (HCC): ICD-10-CM

## 2024-08-29 DIAGNOSIS — I48.91 ATRIAL FIBRILLATION, UNSPECIFIED TYPE (HCC): ICD-10-CM

## 2024-08-29 DIAGNOSIS — J90 PLEURAL EFFUSION: ICD-10-CM

## 2024-08-29 PROCEDURE — 99285 EMERGENCY DEPT VISIT HI MDM: CPT

## 2024-08-29 ASSESSMENT — PAIN - FUNCTIONAL ASSESSMENT: PAIN_FUNCTIONAL_ASSESSMENT: NONE - DENIES PAIN

## 2024-08-30 ENCOUNTER — APPOINTMENT (OUTPATIENT)
Dept: GENERAL RADIOLOGY | Age: 84
DRG: 308 | End: 2024-08-30
Payer: MEDICARE

## 2024-08-30 ENCOUNTER — APPOINTMENT (OUTPATIENT)
Age: 84
DRG: 308 | End: 2024-08-30
Attending: INTERNAL MEDICINE
Payer: MEDICARE

## 2024-08-30 ENCOUNTER — APPOINTMENT (OUTPATIENT)
Dept: CT IMAGING | Age: 84
DRG: 308 | End: 2024-08-30
Payer: MEDICARE

## 2024-08-30 PROBLEM — I48.92 ATRIAL FLUTTER WITH RAPID VENTRICULAR RESPONSE (HCC): Status: ACTIVE | Noted: 2024-08-30

## 2024-08-30 PROBLEM — I48.91 ATRIAL FIBRILLATION WITH RVR (HCC): Status: ACTIVE | Noted: 2024-08-30

## 2024-08-30 LAB
ALBUMIN SERPL-MCNC: 3.7 G/DL (ref 3.5–5.2)
ALBUMIN/GLOB SERPL: 1.3 {RATIO} (ref 1–2.5)
ALP SERPL-CCNC: 143 U/L (ref 35–104)
ALT SERPL-CCNC: 29 U/L (ref 5–33)
ANION GAP SERPL CALCULATED.3IONS-SCNC: 10 MMOL/L (ref 9–17)
ANION GAP SERPL CALCULATED.3IONS-SCNC: 8 MMOL/L (ref 9–17)
AST SERPL-CCNC: 33 U/L
BASOPHILS # BLD: 0.04 K/UL (ref 0–0.2)
BASOPHILS NFR BLD: 1 % (ref 0–2)
BILIRUB SERPL-MCNC: 0.9 MG/DL (ref 0.3–1.2)
BILIRUB UR QL STRIP: NEGATIVE
BNP SERPL-MCNC: 3280 PG/ML
BUN SERPL-MCNC: 37 MG/DL (ref 8–23)
BUN SERPL-MCNC: 40 MG/DL (ref 8–23)
CALCIUM SERPL-MCNC: 9 MG/DL (ref 8.6–10.4)
CALCIUM SERPL-MCNC: 9.2 MG/DL (ref 8.6–10.4)
CHLORIDE SERPL-SCNC: 101 MMOL/L (ref 98–107)
CHLORIDE SERPL-SCNC: 101 MMOL/L (ref 98–107)
CLARITY UR: CLEAR
CO2 SERPL-SCNC: 25 MMOL/L (ref 20–31)
CO2 SERPL-SCNC: 28 MMOL/L (ref 20–31)
COLOR UR: YELLOW
COMMENT: NORMAL
CREAT SERPL-MCNC: 1.3 MG/DL (ref 0.5–0.9)
CREAT SERPL-MCNC: 1.3 MG/DL (ref 0.5–0.9)
D DIMER PPP FEU-MCNC: 1.96 UG/ML FEU
ECHO AO ROOT DIAM: 2.6 CM
ECHO AO ROOT INDEX: 2.22 CM/M2
ECHO BSA: 1.19 M2
ECHO EST RA PRESSURE: 15 MMHG
ECHO IVC PROX: 3.1 CM
ECHO LA DIAMETER INDEX: 4.79 CM/M2
ECHO LA DIAMETER: 5.6 CM
ECHO LA TO AORTIC ROOT RATIO: 2.15
ECHO LV EDV A2C: 41 ML
ECHO LV EDV A4C: 34 ML
ECHO LV EDV INDEX A4C: 29 ML/M2
ECHO LV EDV NDEX A2C: 35 ML/M2
ECHO LV EF PHYSICIAN: 45 %
ECHO LV EJECTION FRACTION A2C: 50 %
ECHO LV EJECTION FRACTION A4C: 48 %
ECHO LV EJECTION FRACTION BIPLANE: 50 % (ref 55–100)
ECHO LV ESV A2C: 20 ML
ECHO LV ESV A4C: 18 ML
ECHO LV ESV INDEX A2C: 17 ML/M2
ECHO LV ESV INDEX A4C: 15 ML/M2
ECHO LV FRACTIONAL SHORTENING: 27 % (ref 28–44)
ECHO LV INTERNAL DIMENSION DIASTOLE INDEX: 2.56 CM/M2
ECHO LV INTERNAL DIMENSION DIASTOLIC: 3 CM (ref 3.9–5.3)
ECHO LV INTERNAL DIMENSION SYSTOLIC INDEX: 1.88 CM/M2
ECHO LV INTERNAL DIMENSION SYSTOLIC: 2.2 CM
ECHO LV IVSD: 1.1 CM (ref 0.6–0.9)
ECHO LV MASS 2D: 95.1 G (ref 67–162)
ECHO LV MASS INDEX 2D: 81.3 G/M2 (ref 43–95)
ECHO LV POSTERIOR WALL DIASTOLIC: 1.1 CM (ref 0.6–0.9)
ECHO LV RELATIVE WALL THICKNESS RATIO: 0.73
ECHO MV MAX VELOCITY: 2.5 M/S
ECHO MV MEAN GRADIENT: 11 MMHG
ECHO MV MEAN VELOCITY: 1.5 M/S
ECHO MV PEAK GRADIENT: 25 MMHG
ECHO MV REGURGITANT PEAK GRADIENT: 88 MMHG
ECHO MV REGURGITANT PEAK VELOCITY: 4.7 M/S
ECHO MV REGURGITANT VTIA: 148 CM
ECHO MV VTI: 75 CM
ECHO RIGHT VENTRICULAR SYSTOLIC PRESSURE (RVSP): 54 MMHG
ECHO TV REGURGITANT MAX VELOCITY: 3.14 M/S
ECHO TV REGURGITANT PEAK GRADIENT: 39 MMHG
EKG ATRIAL RATE: 230 BPM
EKG P AXIS: -91 DEGREES
EKG Q-T INTERVAL: 342 MS
EKG QRS DURATION: 74 MS
EKG QTC CALCULATION (BAZETT): 473 MS
EKG R AXIS: 43 DEGREES
EKG T AXIS: 105 DEGREES
EKG VENTRICULAR RATE: 115 BPM
EOSINOPHIL # BLD: 0.06 K/UL (ref 0–0.4)
EOSINOPHILS RELATIVE PERCENT: 1 % (ref 1–4)
ERYTHROCYTE [DISTWIDTH] IN BLOOD BY AUTOMATED COUNT: 14.3 % (ref 12.5–15.4)
GFR, ESTIMATED: 40 ML/MIN/1.73M2
GFR, ESTIMATED: 40 ML/MIN/1.73M2
GLUCOSE SERPL-MCNC: 150 MG/DL (ref 70–99)
GLUCOSE SERPL-MCNC: 189 MG/DL (ref 70–99)
GLUCOSE UR STRIP-MCNC: NEGATIVE MG/DL
HCT VFR BLD AUTO: 41.6 % (ref 36–46)
HGB BLD-MCNC: 13.7 G/DL (ref 12–16)
HGB UR QL STRIP.AUTO: NEGATIVE
INR PPP: 2.2
KETONES UR STRIP-MCNC: NEGATIVE MG/DL
LEUKOCYTE ESTERASE UR QL STRIP: NEGATIVE
LYMPHOCYTES NFR BLD: 0.99 K/UL (ref 1–4.8)
LYMPHOCYTES RELATIVE PERCENT: 14 % (ref 24–44)
MAGNESIUM SERPL-MCNC: 2.8 MG/DL (ref 1.6–2.6)
MCH RBC QN AUTO: 30.4 PG (ref 26–34)
MCHC RBC AUTO-ENTMCNC: 32.9 G/DL (ref 31–37)
MCV RBC AUTO: 92.2 FL (ref 80–100)
MONOCYTES NFR BLD: 0.41 K/UL (ref 0.1–1.2)
MONOCYTES NFR BLD: 6 % (ref 2–11)
NEUTROPHILS NFR BLD: 78 % (ref 36–66)
NEUTS SEG NFR BLD: 5.81 K/UL (ref 1.8–7.7)
NITRITE UR QL STRIP: NEGATIVE
PH UR STRIP: 7.5 [PH] (ref 5–8)
PLATELET # BLD AUTO: 145 K/UL (ref 140–450)
PMV BLD AUTO: 11.8 FL (ref 8–14)
POTASSIUM SERPL-SCNC: 4.2 MMOL/L (ref 3.7–5.3)
POTASSIUM SERPL-SCNC: 4.8 MMOL/L (ref 3.7–5.3)
PROT SERPL-MCNC: 6.6 G/DL (ref 6.4–8.3)
PROT UR STRIP-MCNC: NEGATIVE MG/DL
PROTHROMBIN TIME: 21.6 SEC (ref 9.4–12.6)
RBC # BLD AUTO: 4.51 M/UL (ref 4–5.2)
SODIUM SERPL-SCNC: 136 MMOL/L (ref 135–144)
SODIUM SERPL-SCNC: 137 MMOL/L (ref 135–144)
SP GR UR STRIP: 1.01 (ref 1–1.03)
TROPONIN I SERPL HS-MCNC: 24 NG/L (ref 0–14)
TROPONIN I SERPL HS-MCNC: 24 NG/L (ref 0–14)
UROBILINOGEN UR STRIP-ACNC: NORMAL EU/DL (ref 0–1)
WBC OTHER # BLD: 7.3 K/UL (ref 3.5–11)

## 2024-08-30 PROCEDURE — 6360000004 HC RX CONTRAST MEDICATION: Performed by: SPECIALIST

## 2024-08-30 PROCEDURE — 36415 COLL VENOUS BLD VENIPUNCTURE: CPT

## 2024-08-30 PROCEDURE — 83880 ASSAY OF NATRIURETIC PEPTIDE: CPT

## 2024-08-30 PROCEDURE — 2580000003 HC RX 258: Performed by: SPECIALIST

## 2024-08-30 PROCEDURE — 93321 DOPPLER ECHO F-UP/LMTD STD: CPT | Performed by: INTERNAL MEDICINE

## 2024-08-30 PROCEDURE — 85610 PROTHROMBIN TIME: CPT

## 2024-08-30 PROCEDURE — 99232 SBSQ HOSP IP/OBS MODERATE 35: CPT | Performed by: FAMILY MEDICINE

## 2024-08-30 PROCEDURE — 93321 DOPPLER ECHO F-UP/LMTD STD: CPT

## 2024-08-30 PROCEDURE — 85025 COMPLETE CBC W/AUTO DIFF WBC: CPT

## 2024-08-30 PROCEDURE — 71260 CT THORAX DX C+: CPT

## 2024-08-30 PROCEDURE — 80053 COMPREHEN METABOLIC PANEL: CPT

## 2024-08-30 PROCEDURE — 99223 1ST HOSP IP/OBS HIGH 75: CPT | Performed by: INTERNAL MEDICINE

## 2024-08-30 PROCEDURE — 83735 ASSAY OF MAGNESIUM: CPT

## 2024-08-30 PROCEDURE — 81003 URINALYSIS AUTO W/O SCOPE: CPT

## 2024-08-30 PROCEDURE — 6370000000 HC RX 637 (ALT 250 FOR IP): Performed by: FAMILY MEDICINE

## 2024-08-30 PROCEDURE — 6360000002 HC RX W HCPCS: Performed by: SPECIALIST

## 2024-08-30 PROCEDURE — 93325 DOPPLER ECHO COLOR FLOW MAPG: CPT | Performed by: INTERNAL MEDICINE

## 2024-08-30 PROCEDURE — 96375 TX/PRO/DX INJ NEW DRUG ADDON: CPT

## 2024-08-30 PROCEDURE — 71045 X-RAY EXAM CHEST 1 VIEW: CPT

## 2024-08-30 PROCEDURE — 84484 ASSAY OF TROPONIN QUANT: CPT

## 2024-08-30 PROCEDURE — 2580000003 HC RX 258: Performed by: FAMILY MEDICINE

## 2024-08-30 PROCEDURE — 93308 TTE F-UP OR LMTD: CPT | Performed by: INTERNAL MEDICINE

## 2024-08-30 PROCEDURE — 2500000003 HC RX 250 WO HCPCS: Performed by: SPECIALIST

## 2024-08-30 PROCEDURE — 96374 THER/PROPH/DIAG INJ IV PUSH: CPT

## 2024-08-30 PROCEDURE — 80048 BASIC METABOLIC PNL TOTAL CA: CPT

## 2024-08-30 PROCEDURE — 2060000000 HC ICU INTERMEDIATE R&B

## 2024-08-30 PROCEDURE — 85379 FIBRIN DEGRADATION QUANT: CPT

## 2024-08-30 RX ORDER — IOPAMIDOL 755 MG/ML
75 INJECTION, SOLUTION INTRAVASCULAR
Status: COMPLETED | OUTPATIENT
Start: 2024-08-30 | End: 2024-08-30

## 2024-08-30 RX ORDER — METOPROLOL SUCCINATE 25 MG/1
25 TABLET, EXTENDED RELEASE ORAL DAILY
Status: DISCONTINUED | OUTPATIENT
Start: 2024-08-30 | End: 2024-09-01 | Stop reason: HOSPADM

## 2024-08-30 RX ORDER — POTASSIUM CHLORIDE 7.45 MG/ML
10 INJECTION INTRAVENOUS PRN
Status: DISCONTINUED | OUTPATIENT
Start: 2024-08-30 | End: 2024-09-01 | Stop reason: HOSPADM

## 2024-08-30 RX ORDER — SODIUM CHLORIDE 0.9 % (FLUSH) 0.9 %
5-40 SYRINGE (ML) INJECTION EVERY 12 HOURS SCHEDULED
Status: DISCONTINUED | OUTPATIENT
Start: 2024-08-30 | End: 2024-09-01 | Stop reason: HOSPADM

## 2024-08-30 RX ORDER — ONDANSETRON 4 MG/1
4 TABLET, ORALLY DISINTEGRATING ORAL EVERY 8 HOURS PRN
Status: DISCONTINUED | OUTPATIENT
Start: 2024-08-30 | End: 2024-09-01 | Stop reason: HOSPADM

## 2024-08-30 RX ORDER — ONDANSETRON 2 MG/ML
4 INJECTION INTRAMUSCULAR; INTRAVENOUS ONCE
Status: COMPLETED | OUTPATIENT
Start: 2024-08-30 | End: 2024-08-30

## 2024-08-30 RX ORDER — ACETAMINOPHEN 325 MG/1
650 TABLET ORAL EVERY 6 HOURS PRN
Status: DISCONTINUED | OUTPATIENT
Start: 2024-08-30 | End: 2024-09-01 | Stop reason: HOSPADM

## 2024-08-30 RX ORDER — POLYETHYLENE GLYCOL 3350 17 G/17G
17 POWDER, FOR SOLUTION ORAL DAILY PRN
Status: DISCONTINUED | OUTPATIENT
Start: 2024-08-30 | End: 2024-09-01 | Stop reason: HOSPADM

## 2024-08-30 RX ORDER — ACETAMINOPHEN 650 MG/1
650 SUPPOSITORY RECTAL EVERY 6 HOURS PRN
Status: DISCONTINUED | OUTPATIENT
Start: 2024-08-30 | End: 2024-09-01 | Stop reason: HOSPADM

## 2024-08-30 RX ORDER — WARFARIN SODIUM 1 MG/1
2 TABLET ORAL
Status: DISCONTINUED | OUTPATIENT
Start: 2024-08-30 | End: 2024-08-31

## 2024-08-30 RX ORDER — WARFARIN SODIUM 1 MG/1
1 TABLET ORAL
Status: DISCONTINUED | OUTPATIENT
Start: 2024-09-03 | End: 2024-08-31

## 2024-08-30 RX ORDER — WARFARIN SODIUM 2 MG/1
2 TABLET ORAL DAILY
Status: DISCONTINUED | OUTPATIENT
Start: 2024-08-30 | End: 2024-08-30

## 2024-08-30 RX ORDER — MAGNESIUM HYDROXIDE/ALUMINUM HYDROXICE/SIMETHICONE 120; 1200; 1200 MG/30ML; MG/30ML; MG/30ML
30 SUSPENSION ORAL EVERY 6 HOURS PRN
Status: DISCONTINUED | OUTPATIENT
Start: 2024-08-30 | End: 2024-09-01 | Stop reason: HOSPADM

## 2024-08-30 RX ORDER — SODIUM CHLORIDE 0.9 % (FLUSH) 0.9 %
5-40 SYRINGE (ML) INJECTION PRN
Status: DISCONTINUED | OUTPATIENT
Start: 2024-08-30 | End: 2024-09-01 | Stop reason: HOSPADM

## 2024-08-30 RX ORDER — POTASSIUM CHLORIDE 1500 MG/1
40 TABLET, EXTENDED RELEASE ORAL PRN
Status: DISCONTINUED | OUTPATIENT
Start: 2024-08-30 | End: 2024-09-01 | Stop reason: HOSPADM

## 2024-08-30 RX ORDER — SODIUM CHLORIDE 9 MG/ML
INJECTION, SOLUTION INTRAVENOUS CONTINUOUS
Status: DISCONTINUED | OUTPATIENT
Start: 2024-08-30 | End: 2024-08-30

## 2024-08-30 RX ORDER — LANOLIN ALCOHOL/MO/W.PET/CERES
3 CREAM (GRAM) TOPICAL NIGHTLY
Status: DISCONTINUED | OUTPATIENT
Start: 2024-08-30 | End: 2024-09-01 | Stop reason: HOSPADM

## 2024-08-30 RX ORDER — 0.9 % SODIUM CHLORIDE 0.9 %
80 INTRAVENOUS SOLUTION INTRAVENOUS ONCE
Status: DISCONTINUED | OUTPATIENT
Start: 2024-08-30 | End: 2024-09-01 | Stop reason: HOSPADM

## 2024-08-30 RX ORDER — DILTIAZEM HYDROCHLORIDE 5 MG/ML
10 INJECTION INTRAVENOUS ONCE
Status: COMPLETED | OUTPATIENT
Start: 2024-08-30 | End: 2024-08-30

## 2024-08-30 RX ORDER — MAGNESIUM SULFATE IN WATER 40 MG/ML
2000 INJECTION, SOLUTION INTRAVENOUS PRN
Status: DISCONTINUED | OUTPATIENT
Start: 2024-08-30 | End: 2024-09-01 | Stop reason: HOSPADM

## 2024-08-30 RX ORDER — FUROSEMIDE 10 MG/ML
40 INJECTION INTRAMUSCULAR; INTRAVENOUS ONCE
Status: COMPLETED | OUTPATIENT
Start: 2024-08-30 | End: 2024-08-30

## 2024-08-30 RX ORDER — FAMOTIDINE 20 MG/1
20 TABLET, FILM COATED ORAL DAILY
Status: DISCONTINUED | OUTPATIENT
Start: 2024-08-30 | End: 2024-09-01 | Stop reason: HOSPADM

## 2024-08-30 RX ORDER — SODIUM CHLORIDE 9 MG/ML
INJECTION, SOLUTION INTRAVENOUS CONTINUOUS
Status: ACTIVE | OUTPATIENT
Start: 2024-08-30 | End: 2024-09-01

## 2024-08-30 RX ORDER — SODIUM CHLORIDE 0.9 % (FLUSH) 0.9 %
10 SYRINGE (ML) INJECTION ONCE
Status: COMPLETED | OUTPATIENT
Start: 2024-08-30 | End: 2024-08-30

## 2024-08-30 RX ORDER — ONDANSETRON 2 MG/ML
4 INJECTION INTRAMUSCULAR; INTRAVENOUS EVERY 6 HOURS PRN
Status: DISCONTINUED | OUTPATIENT
Start: 2024-08-30 | End: 2024-09-01 | Stop reason: HOSPADM

## 2024-08-30 RX ORDER — SODIUM CHLORIDE 9 MG/ML
INJECTION, SOLUTION INTRAVENOUS PRN
Status: DISCONTINUED | OUTPATIENT
Start: 2024-08-30 | End: 2024-09-01 | Stop reason: HOSPADM

## 2024-08-30 RX ADMIN — SODIUM CHLORIDE, PRESERVATIVE FREE 10 ML: 5 INJECTION INTRAVENOUS at 02:20

## 2024-08-30 RX ADMIN — DILTIAZEM HYDROCHLORIDE 10 MG: 5 INJECTION, SOLUTION INTRAVENOUS at 00:41

## 2024-08-30 RX ADMIN — SODIUM CHLORIDE: 9 INJECTION, SOLUTION INTRAVENOUS at 02:44

## 2024-08-30 RX ADMIN — FAMOTIDINE 20 MG: 20 TABLET ORAL at 09:14

## 2024-08-30 RX ADMIN — IOPAMIDOL 75 ML: 755 INJECTION, SOLUTION INTRAVENOUS at 02:20

## 2024-08-30 RX ADMIN — Medication 80 ML: at 02:20

## 2024-08-30 RX ADMIN — FUROSEMIDE 40 MG: 10 INJECTION, SOLUTION INTRAMUSCULAR; INTRAVENOUS at 04:22

## 2024-08-30 RX ADMIN — DILTIAZEM HYDROCHLORIDE 5 MG/HR: 5 INJECTION, SOLUTION INTRAVENOUS at 03:43

## 2024-08-30 RX ADMIN — SODIUM CHLORIDE: 9 INJECTION, SOLUTION INTRAVENOUS at 05:48

## 2024-08-30 RX ADMIN — WARFARIN SODIUM 2 MG: 1 TABLET ORAL at 20:33

## 2024-08-30 RX ADMIN — ONDANSETRON 4 MG: 2 INJECTION INTRAMUSCULAR; INTRAVENOUS at 00:40

## 2024-08-30 ASSESSMENT — ENCOUNTER SYMPTOMS
COUGH: 0
BLOOD IN STOOL: 0
VOMITING: 0
ABDOMINAL PAIN: 0
DIARRHEA: 0
SORE THROAT: 0
SHORTNESS OF BREATH: 0
NAUSEA: 1
BACK PAIN: 0

## 2024-08-30 NOTE — H&P
[facility] Medicine   IN-PATIENT [facility] - Location: [place]    HISTORY AND PHYSICAL EXAMINATION            Date:   8/30/2024  Patient name:  Tracee Riggins  Date of admission:  8/29/2024 11:43 PM  MRN:   [id number]  Account:  [id number]  YOB: 1940  PCP:    Beverly Ventura DO  Room:   [address] [place] [address]  Code Status:    Full Code      History Obtained From:     patient and family members    History of Present Illness:     Tracee Riggins is a 83 y.o. Non- / non  female who presents with Nausea and Dizziness   and is admitted to the hospital for the management of Atrial fibrillation with RVR (HCC).  Patient has been very healthy and has not had any cardiac issues recently at all.  She was out bending over picking up sticks and pulling weeds yesterday morning without any difficulty.  At approximately 10:45 PM last night she had acute vertigo diaphoresis and dry heaves.  She did not have any chest pain nor palpitations.  She was transported to ER by EMS and was found to be in atrial fibs with rapid ventricular response and she was started on Cardizem to control rate also congestive changes were noted on chest x-ray and she was given a dose of Lasix.  Through the night she converted to normal sinus rhythm and she is now on tapering doses of Cardizem.  This morning she feels back to normal and has no complaints.  Her last episode of atrial fibs was approximately a year and a half ago and again this occurred after she was outside bending over doing yard work  In the ER electrolytes were normal random sugar 150 BUN 40 and creatinine 1.3 which is up just a little bit for this patient.  CBC was normal troponins were stable at 24 D-dimer was 1.96 and CTA of the chest was negative for PE.  INR was therapeutic at 2.2.  proBNP was over 3000    Past Medical History:     Past Medical History:   Diagnosis Date    Chronic kidney disease     Gout 12/2019    Gout 05/2020    Hypertension     Result Value Ref Range    Protime 21.6 (H) 9.4 - 12.6 sec    INR 2.2    Urinalysis with Reflex to Culture    Collection Time: 08/30/24  2:45 AM    Specimen: Urine   Result Value Ref Range    Color, UA Yellow Yellow    Turbidity UA Clear Clear    Glucose, Ur NEGATIVE NEGATIVE mg/dL    Bilirubin, Urine NEGATIVE NEGATIVE    Ketones, Urine NEGATIVE NEGATIVE mg/dL    Specific Gravity, UA 1.010 1.005 - 1.030    Urine Hgb NEGATIVE NEGATIVE    pH, Urine 7.5 5.0 - 8.0    Protein, UA NEGATIVE NEGATIVE mg/dL    Urobilinogen, Urine Normal 0.0 - 1.0 EU/dL    Nitrite, Urine NEGATIVE NEGATIVE    Leukocyte Esterase, Urine NEGATIVE NEGATIVE    Comment       Microscopic exam not performed based on chemical results unless requested in original order.    Comment          Comment       Utilizing a urinalysis as the only screening method to exclude a potential uropathogen can be unreliable in many patient populations.  Rapid screening tests are less sensitive than culture and if UTI is a clinical possibility, culture should be considered despite a negative urinalysis.     Troponin    Collection Time: 08/30/24  3:00 AM   Result Value Ref Range    Troponin, High Sensitivity 24 (H) 0 - 14 ng/L   Brain Natriuretic Peptide    Collection Time: 08/30/24  3:00 AM   Result Value Ref Range    Pro-BNP 3,280 (H) <300 pg/mL       Imaging/Diagnostics:  CT CHEST PULMONARY EMBOLISM W CONTRAST    Result Date: 8/30/2024  1. No evidence of pulmonary embolism. 2. Cardiomegaly with moderate bilateral pleural effusions and mild pulmonary edema. 3. Significant reflux of contrast into the hepatic veins consistent with right heart dysfunction.     XR CHEST PORTABLE    Result Date: 8/30/2024  1. Enlarged cardiomediastinal silhouette with basilar predominant hazy opacities suggestive of mild pulmonary edema. 2. Small to moderate bilateral pleural effusions.       Assessment :      Hospital Problems             Last Modified POA    * (Principal) Atrial

## 2024-08-30 NOTE — PROGRESS NOTES
Pharmacy Note  Warfarin Consult    Tracee Riggins is a 83 y.o. female for whom pharmacy has been consulted to manage warfarin therapy.     Consulting Physician: ANGELES Miguel   Reason for Admission: Afib    Warfarin dose prior to admission: 1 mg on tue/wed and 2 mg all other days  Warfarin indication: Afib with RVR  Target INR range: 2-3     Past Medical History:   Diagnosis Date    Chronic kidney disease     Gout 12/2019    Gout 05/2020    Hypertension     Lumbar disc disease     Thrombocytopenia (HCC)     Vitamin D deficiency                 Recent Labs     08/30/24  0041   INR 2.2     Recent Labs     08/30/24  0041   HGB 13.7   HCT 41.6          Current warfarin drug-drug interactions: tylenol      Date             INR        Dose   8/30/2024        2.2           Daily PT/INR while inpatient.    Thank you for the consult.  Will continue to follow.    Ronal Sears Pharm.D.    8/30/2024  5:30 AM

## 2024-08-30 NOTE — PROGRESS NOTES
Spiritual Health Assessment/Progress Note  [facility]    (P) Spiritual/Emotional Needs,  ,  ,      Name: Tracee Riggins MRN: [id number]    Age: 83 y.o.     Sex: female   Language: English   Nondenominational: [employer]   Atrial fibrillation with RVR (HCC)     Date: 8/30/2024            Total Time Calculated: (P) 10 min              Spiritual Assessment began in [facility]        Referral/Consult From: (P) Nurse   Encounter Overview/Reason: (P) Spiritual/Emotional Needs  Service Provided For: (P) Patient    Writer met w/ pt and pt's sons in pt's room. Patient's son shared about pt's current medical challenges. Pt shared concerns for her family, as several family members also have challenging medical circumstances. Pt requested writer to pray for these family members.     Yenifer, Belief, Meaning:   Patient is connected with a yenifer tradition or spiritual practice  Family/Friends are connected with a yenifer tradition or spiritual practice      Importance and Influence:  Patient has spiritual/personal beliefs that influence decisions regarding their health  Family/Friends have spiritual/personal beliefs that influence decisions regarding the patient's health    Community:  Patient feels well-supported. Support system includes: Children  Family/Friends feel well-supported. Support system includes: Extended family    Assessment and Plan of Care:     Patient Interventions include: Facilitated expression of thoughts and feelings and Provided sacramental/Bahai ritual  Family/Friends Interventions include: Family/Friends Interventions and Provided sacramental/Bahai ritual    Patient Plan of Care: Spiritual Care available upon further referral  Family/Friends Plan of Care: Spiritual Care available upon further referral    Electronically signed by Chaplain Shelia on 8/30/2024 at 10:08 AM

## 2024-08-30 NOTE — CONSULTS
Cardiology Consultation         Date:   8/30/2024  Patient name: Tracee Riggins  Date of admission:  8/29/2024 11:43 PM  MRN:   [id number]  YOB: 1940    Reason for Admission: atrial flutter with RVR     Chief Complaint: dizziness and palpitations.     History of present illness:   83-year-old female with known history of paroxysmal atrial fibrillation, on chronic warfarin therapy, history of bioprosthetic mitral valve replacement in 1986 and 2009, admitted with episode of dizziness and palpitations which started yesterday evening.  Patient was apparently working in her yard in the morning when she did feel dizzy and lightheaded.  Later in the evening she started having palpitations.  She was found to be in atrial flutter with rapid ventricular response.  Started on IV Cardizem with spontaneous conversion to normal sinus rhythm overnight.  This morning she is feeling much better.  She denies any recent anginal chest pain or dyspnea on exertion.  No decline in her functional capacity overall.  She denies any recent falls or bleeding    Past Medical History:   has a past medical history of Chronic kidney disease, Gout, Gout, Hypertension, Lumbar disc disease, Thrombocytopenia (HCC), and Vitamin D deficiency.    Past Surgical History:   has a past surgical history that includes CABG with Mitral Valve Replacement and Cataract removal.     Home Medications:    Prior to Admission medications    Medication Sig Start Date End Date Taking? Authorizing Provider   Magnesium 125 MG CAPS Take by mouth  Patient not taking: Reported on 8/30/2024    Juliana Dejesus MD   warfarin (COUMADIN) 1 MG tablet Take 2 tablets by mouth AS DIRECTED BY DR LYNCH WHO FOLLOWS HER PT/INR 12/5/16   Juliana Dejesus MD       Allergies:  Patient has no known allergies.    Social History:   reports that she has never smoked. She has never used smokeless tobacco. She reports that she does not drink alcohol and does

## 2024-08-30 NOTE — ED PROVIDER NOTES
[facility]  EMERGENCY DEPARTMENT ENCOUNTER      Pt Name: Tracee Riggins  MRN: [id number]  Birthdate 1940  Date of evaluation: 8/30/24      CHIEF COMPLAINT       Chief Complaint   Patient presents with    Nausea    Dizziness         HISTORY OF PRESENT ILLNESS    Tracee Riggins is a 83 y.o. female who presents to the emergency department brought in via EMS for evaluation of lightheadedness, dizziness, nausea and dry heaves since about 10:45 PM prior to arrival.  Patient also felt hot and sweaty but was not diaphoretic.  She felt like she may pass out but also admits to vertigo.  She denies any headache, visual disturbances, chest pain, shortness of breath, palpitations, cough, fever or chills.  No history of abdominal pain, vomiting or diarrhea.  Her last bowel movement was on the morning of presentation on August 29, 2024.  She denies any melena or hematochezia.  She also denies any urinary frequency, urgency, dysuria or hematuria.  Patient has history of chronic kidney disease, coronary artery disease status post coronary artery bypass graft surgery, mitral valve replacement, hypertension and thrombocytopenia.  Her only medication is a warfarin 1 mg on Tuesdays and Wednesdays and 2 mg rest of the week.      REVIEW OF SYSTEMS       Review of Systems   Constitutional:  Negative for chills and fever.   HENT:  Negative for congestion and sore throat.    Eyes:  Negative for visual disturbance.   Respiratory:  Negative for cough and shortness of breath.    Cardiovascular:  Negative for chest pain and palpitations.   Gastrointestinal:  Positive for nausea. Negative for abdominal pain, blood in stool, diarrhea and vomiting.   Genitourinary:  Negative for dysuria and frequency.   Musculoskeletal:  Negative for back pain and myalgias.   Neurological:  Positive for dizziness and light-headedness. Negative for headaches.   All other systems reviewed and are negative.        PAST MEDICAL HISTORY    has a past  (!) 140/87, Temp: 97.7 °F (36.5 °C), Pulse: (!) 110, Respirations: 13    Orders Placed This Encounter   Medications    ondansetron (ZOFRAN) injection 4 mg    dilTIAZem injection 10 mg    DISCONTD: 0.9 % sodium chloride infusion    sodium chloride 0.9 % bolus 80 mL    sodium chloride flush 0.9 % injection 10 mL    iopamidol (ISOVUE-370) 76 % injection 75 mL    dilTIAZem 125 mg in sodium chloride 0.9 % 125 mL infusion     Order Specific Question:   Titrate Infusion?     Answer:   Yes     Order Specific Question:   Initial Infusion Dose:     Answer:   5 mg/hr     Order Specific Question:   Goal of Therapy is:     Answer:   HR less than 100 bpm     Order Specific Question:   Contact Provider if:     Answer:   SBP less than 90 mmHg     Order Specific Question:   Contact Provider if:     Answer:   HR less than 60 bpm     Order Specific Question:   HOLD for     Answer:   SBP less than 90 mmHg     Order Specific Question:   HOLD for     Answer:   HR less than 60 bpm    furosemide (LASIX) injection 40 mg         During the emergency department course, patient was given Zofran 4 mg IV push, diltiazem 10 mg slow IV push followed by infusion and Lasix 40 mg IV push.  Patient is feeling much better and resting comfortably.  Her nausea is resolved.  She continues to be tachycardic and Cardizem drip is being titrated.  She has congestive heart failure and bilateral pleural effusions.    CONSULTS:  I discussed the case with Dr. Torrey Horner covering for Dr. Ventura and he kindly accepted the patient to be admitted on stepdown unit.  Admission condition is stable.    PROCEDURES:  None    FINAL IMPRESSION      1. Atrial flutter with rapid ventricular response (HCC)    2. Congestive heart failure, unspecified HF chronicity, unspecified heart failure type (HCC)    3. Pleural effusion          DISPOSITION/PLAN       PATIENT REFERRED TO:  No follow-up provider specified.    DISCHARGE MEDICATIONS:  New Prescriptions    No medications on file

## 2024-08-30 NOTE — ED NOTES
Pt awake in bed; resting quietly w/ family at bedside. Informed of current admit process/timing. IV drip patent. No distress at this  time. Pt will have brief episodes of lower heart rates to 80-90's and occass. P waves noted but will return to atrial flutter rhythm. Pt remains painfree

## 2024-08-30 NOTE — ED NOTES
Pt presents to ER via squad for dizziness, nausea. Pt states she had several rounds of emesis prior to arrival. Pt states this has happened before but hospital was not able to say why. Pt has a history of afib. Pt states it is usually well controlled. On arrival pt is tachycardic in the 120s. Pt states she is also dizzy. Pt is alert and oriented x4. Family member at bedside. Pt states she normally is able to walk without assistance. Pt denies any pain.

## 2024-08-30 NOTE — CARE COORDINATION
provide assistance at DC: (P) Yes  Would you like Case Management to discuss the discharge plan with any other family members/significant others, and if so, who? (P) No  Plans to Return to Present Housing: (P) Yes  Other Identified Issues/Barriers to RETURNING to current housing: None noted.  Potential Assistance needed at discharge: (P) N/A            Potential DME:    Patient expects to discharge to: (P) House  Plan for transportation at discharge: (P) Family    Financial    Payor: MEDICARE / Plan: MEDICARE PART A AND B / Product Type: *No Product type* /     Does insurance require precert for SNF: Yes    Potential assistance Purchasing Medications: (P) No  Meds-to-Beds request:        [facility] [place] [facility] [address] [phone] - F [phone]  [address] [place] [address]  Phone: [phone] Fax: [phone]    [id number] - [place], [place] - [address] [phone] - F [phone]  [address]  [place] [address]  Phone: [phone] Fax: [phone]      Notes:    Factors facilitating achievement of predicted outcomes: Family support, Cooperative, and Pleasant    Barriers to discharge: Medical complications    Additional Case Management Notes: Patient plans to go home. Lives with son, has transportation.     The Plan for Transition of Care is related to the following treatment goals of Pleural effusion [J90]  Atrial flutter with rapid ventricular response (HCC) [I48.92]  Atrial fibrillation with RVR (HCC) [I48.91]  Congestive heart failure, unspecified HF chronicity, unspecified heart failure type (HCC) [I50.9]    IF APPLICABLE: The Patient and/or patient representative Tracee and her family were provided with a choice of provider and agrees with the discharge plan. Freedom of choice list with basic dialogue that supports the patient's individualized plan of care/goals and shares the quality data associated with the providers was provided to:     Patient  Representative Name:       The Patient and/or Patient Representative Agree with the Discharge Plan?  Yes    Twyla Beckwith RN  Case Management Department

## 2024-08-30 NOTE — ED NOTES
Update provided to floor RN regarding rhythm change; floor RN will notify cardiology and writer will await attending response. Pt to floor via stretcher.

## 2024-08-30 NOTE — PLAN OF CARE
Problem: Discharge Planning  Goal: Discharge to home or other facility with appropriate resources  Outcome: Progressing  Flowsheets (Taken 8/30/2024 3862)  Discharge to home or other facility with appropriate resources:   Identify barriers to discharge with patient and caregiver   Arrange for needed discharge resources and transportation as appropriate     Problem: ABCDS Injury Assessment  Goal: Absence of physical injury  Outcome: Progressing     Problem: Chronic Conditions and Co-morbidities  Goal: Patient's chronic conditions and co-morbidity symptoms are monitored and maintained or improved  Outcome: Progressing

## 2024-08-30 NOTE — PROGRESS NOTES
Pharmacy Note     Renal Dose Adjustment    Tracee Riggins is a 83 y.o. female. Pharmacist assessment of renally cleared medications.    Recent Labs     08/30/24  0041   BUN 40*       Recent Labs     08/30/24  0041   CREATININE 1.3*       Estimated Creatinine Clearance: 21 mL/min (A) (based on SCr of 1.3 mg/dL (H)).  Estimated CrCl : 21 mL/min (based on weight of 42.6 kg)    Height:   Ht Readings from Last 1 Encounters:   03/28/24 1.499 m (4' 11\")     Weight:  Wt Readings from Last 1 Encounters:   08/29/24 42.6 kg (94 lb)       The following medication dose has been adjusted based upon renal function per P&T Guidelines:             Famotidine dose adjusted from 20 mg po bid to 20 mg po daily.      Ronal Sears Pharm.D.    8/30/2024  4:51 AM

## 2024-08-31 ENCOUNTER — APPOINTMENT (OUTPATIENT)
Dept: GENERAL RADIOLOGY | Age: 84
DRG: 308 | End: 2024-08-31
Payer: MEDICARE

## 2024-08-31 LAB
ALBUMIN SERPL-MCNC: 3.2 G/DL (ref 3.5–5.2)
ALBUMIN/GLOB SERPL: 1.2 {RATIO} (ref 1–2.5)
ALP SERPL-CCNC: 120 U/L (ref 35–104)
ALT SERPL-CCNC: 26 U/L (ref 5–33)
ANION GAP SERPL CALCULATED.3IONS-SCNC: 9 MMOL/L (ref 9–17)
AST SERPL-CCNC: 33 U/L
BASOPHILS # BLD: 0 K/UL (ref 0–0.2)
BASOPHILS NFR BLD: 1 % (ref 0–2)
BILIRUB SERPL-MCNC: 0.8 MG/DL (ref 0.3–1.2)
BUN SERPL-MCNC: 40 MG/DL (ref 8–23)
CALCIUM SERPL-MCNC: 8.5 MG/DL (ref 8.6–10.4)
CHLORIDE SERPL-SCNC: 106 MMOL/L (ref 98–107)
CO2 SERPL-SCNC: 25 MMOL/L (ref 20–31)
CREAT SERPL-MCNC: 1.4 MG/DL (ref 0.5–0.9)
EOSINOPHIL # BLD: 0.1 K/UL (ref 0–0.4)
EOSINOPHILS RELATIVE PERCENT: 1 % (ref 1–4)
ERYTHROCYTE [DISTWIDTH] IN BLOOD BY AUTOMATED COUNT: 14.4 % (ref 12.5–15.4)
GFR, ESTIMATED: 37 ML/MIN/1.73M2
GLUCOSE SERPL-MCNC: 103 MG/DL (ref 70–99)
HCT VFR BLD AUTO: 38.6 % (ref 36–46)
HGB BLD-MCNC: 12.7 G/DL (ref 12–16)
INR PPP: 1.6
LYMPHOCYTES NFR BLD: 1.1 K/UL (ref 1–4.8)
LYMPHOCYTES RELATIVE PERCENT: 21 % (ref 24–44)
MCH RBC QN AUTO: 30 PG (ref 26–34)
MCHC RBC AUTO-ENTMCNC: 32.8 G/DL (ref 31–37)
MCV RBC AUTO: 91.7 FL (ref 80–100)
MONOCYTES NFR BLD: 0.4 K/UL (ref 0.1–1.2)
MONOCYTES NFR BLD: 7 % (ref 2–11)
NEUTROPHILS NFR BLD: 70 % (ref 36–66)
NEUTS SEG NFR BLD: 3.8 K/UL (ref 1.8–7.7)
PLATELET # BLD AUTO: 149 K/UL (ref 140–450)
PMV BLD AUTO: 9.6 FL (ref 6–12)
POTASSIUM SERPL-SCNC: 4.6 MMOL/L (ref 3.7–5.3)
PROT SERPL-MCNC: 5.9 G/DL (ref 6.4–8.3)
PROTHROMBIN TIME: 16.9 SEC (ref 9.4–12.6)
RBC # BLD AUTO: 4.21 M/UL (ref 4–5.2)
SODIUM SERPL-SCNC: 140 MMOL/L (ref 135–144)
TROPONIN I SERPL HS-MCNC: 25 NG/L (ref 0–14)
WBC OTHER # BLD: 5.1 K/UL (ref 3.5–11)

## 2024-08-31 PROCEDURE — 85027 COMPLETE CBC AUTOMATED: CPT

## 2024-08-31 PROCEDURE — 6370000000 HC RX 637 (ALT 250 FOR IP): Performed by: FAMILY MEDICINE

## 2024-08-31 PROCEDURE — 99232 SBSQ HOSP IP/OBS MODERATE 35: CPT | Performed by: INTERNAL MEDICINE

## 2024-08-31 PROCEDURE — 6360000002 HC RX W HCPCS: Performed by: FAMILY MEDICINE

## 2024-08-31 PROCEDURE — 2500000003 HC RX 250 WO HCPCS: Performed by: INTERNAL MEDICINE

## 2024-08-31 PROCEDURE — 84484 ASSAY OF TROPONIN QUANT: CPT

## 2024-08-31 PROCEDURE — 36415 COLL VENOUS BLD VENIPUNCTURE: CPT

## 2024-08-31 PROCEDURE — 99232 SBSQ HOSP IP/OBS MODERATE 35: CPT | Performed by: FAMILY MEDICINE

## 2024-08-31 PROCEDURE — 93005 ELECTROCARDIOGRAM TRACING: CPT | Performed by: FAMILY MEDICINE

## 2024-08-31 PROCEDURE — 6360000002 HC RX W HCPCS: Performed by: INTERNAL MEDICINE

## 2024-08-31 PROCEDURE — 2580000003 HC RX 258: Performed by: FAMILY MEDICINE

## 2024-08-31 PROCEDURE — 85610 PROTHROMBIN TIME: CPT

## 2024-08-31 PROCEDURE — 80053 COMPREHEN METABOLIC PANEL: CPT

## 2024-08-31 PROCEDURE — 71046 X-RAY EXAM CHEST 2 VIEWS: CPT

## 2024-08-31 PROCEDURE — 2060000000 HC ICU INTERMEDIATE R&B

## 2024-08-31 PROCEDURE — 2500000003 HC RX 250 WO HCPCS: Performed by: FAMILY MEDICINE

## 2024-08-31 RX ORDER — 0.9 % SODIUM CHLORIDE 0.9 %
500 INTRAVENOUS SOLUTION INTRAVENOUS ONCE
Status: COMPLETED | OUTPATIENT
Start: 2024-08-31 | End: 2024-08-31

## 2024-08-31 RX ORDER — METOPROLOL TARTRATE 25 MG/1
25 TABLET, FILM COATED ORAL ONCE
Status: COMPLETED | OUTPATIENT
Start: 2024-08-31 | End: 2024-08-31

## 2024-08-31 RX ORDER — WARFARIN SODIUM 2.5 MG/1
2.5 TABLET ORAL
Status: COMPLETED | OUTPATIENT
Start: 2024-08-31 | End: 2024-08-31

## 2024-08-31 RX ORDER — DIPHENHYDRAMINE HYDROCHLORIDE 50 MG/ML
25 INJECTION INTRAMUSCULAR; INTRAVENOUS ONCE
Status: COMPLETED | OUTPATIENT
Start: 2024-08-31 | End: 2024-08-31

## 2024-08-31 RX ADMIN — FAMOTIDINE 20 MG: 10 INJECTION, SOLUTION INTRAVENOUS at 16:17

## 2024-08-31 RX ADMIN — SODIUM CHLORIDE, PRESERVATIVE FREE 10 ML: 5 INJECTION INTRAVENOUS at 20:59

## 2024-08-31 RX ADMIN — ONDANSETRON 4 MG: 2 INJECTION INTRAMUSCULAR; INTRAVENOUS at 16:17

## 2024-08-31 RX ADMIN — SODIUM CHLORIDE 500 ML: 9 INJECTION, SOLUTION INTRAVENOUS at 10:48

## 2024-08-31 RX ADMIN — SODIUM CHLORIDE: 9 INJECTION, SOLUTION INTRAVENOUS at 02:48

## 2024-08-31 RX ADMIN — AMIODARONE HYDROCHLORIDE 150 MG: 1.5 INJECTION, SOLUTION INTRAVENOUS at 10:15

## 2024-08-31 RX ADMIN — WARFARIN SODIUM 2.5 MG: 2.5 TABLET ORAL at 20:59

## 2024-08-31 RX ADMIN — METOPROLOL SUCCINATE 25 MG: 25 TABLET, EXTENDED RELEASE ORAL at 08:25

## 2024-08-31 RX ADMIN — DIPHENHYDRAMINE HYDROCHLORIDE 25 MG: 50 INJECTION INTRAMUSCULAR; INTRAVENOUS at 10:57

## 2024-08-31 RX ADMIN — SODIUM CHLORIDE, PRESERVATIVE FREE 10 ML: 5 INJECTION INTRAVENOUS at 08:25

## 2024-08-31 RX ADMIN — AMIODARONE HYDROCHLORIDE 0.5 MG/MIN: 1.8 INJECTION, SOLUTION INTRAVENOUS at 13:06

## 2024-08-31 RX ADMIN — METOPROLOL TARTRATE 25 MG: 25 TABLET, FILM COATED ORAL at 00:30

## 2024-08-31 NOTE — PROGRESS NOTES
[facility]  IN-PATIENT SERVICE   [facility] - Location: [place]    Progress Note    2024    10:29 AM    Name:   Tracee Riggins  MRN:     [id number]     Acct:      [id number]   Room:   [facility]/321-01   Day:  1  Admit Date:  2024 11:43 PM    PCP:   Beverly Ventura DO  Code Status:  Full Code    Subjective:     Patient without specific complaints.  She did go back into A-fib last night.  She cannot necessarily tell when she is in A-fib.  No lightheadedness or dizziness no chest pain or shortness of breath.  She feels the leg swelling she had when she was admitted is back down to her baseline.  Eating well, walking to bathroom okay.  Apparently the metoprolol ordered yesterday had to be held due to lower BP reading    Medications:     Allergies:  No Known Allergies    Current Meds:   Scheduled Meds:    warfarin  2.5 mg Oral Once    sodium chloride  80 mL IntraVENous Once    sodium chloride flush  5-40 mL IntraVENous 2 times per day    melatonin  3 mg Oral Nightly    famotidine  20 mg Oral Daily    warfarin placeholder: dosing by pharmacy   Other RX Placeholder    metoprolol succinate  25 mg Oral Daily     Continuous Infusions:    amiodarone      Followed by    amiodarone      sodium chloride      sodium chloride 50 mL/hr at 24 0248    dilTIAZem 125 mg in sodium chloride 0.9 % 125 mL infusion Stopped (24 1102)     PRN Meds: sodium chloride flush, sodium chloride, ondansetron **OR** ondansetron, polyethylene glycol, acetaminophen **OR** acetaminophen, potassium chloride **OR** potassium alternative oral replacement **OR** potassium chloride, magnesium sulfate, aluminum & magnesium hydroxide-simethicone    Data:     Vitals:  /84   Pulse (!) 115   Temp 98 °F (36.7 °C) (Oral)   Resp 18   Ht 1.295 m (4' 3\")   Wt 39.5 kg (87 lb 1.3 oz)   SpO2 97%   BMI 23.54 kg/m²   Temp (24hrs), Av.9 °F (36.6 °C), Min:97.5 °F (36.4 °C), Max:98.4 °F (36.9 °C)    No results

## 2024-08-31 NOTE — PLAN OF CARE
Problem: Discharge Planning  Goal: Discharge to home or other facility with appropriate resources  8/31/2024 1218 by Fito Hurtado RN  Outcome: Progressing     Problem: ABCDS Injury Assessment  Goal: Absence of physical injury  8/31/2024 1218 by Fito Hurtado RN  Outcome: Progressing     Problem: Chronic Conditions and Co-morbidities  Goal: Patient's chronic conditions and co-morbidity symptoms are monitored and maintained or improved  8/31/2024 1218 by Fito Hurtado RN  Outcome: Progressing

## 2024-08-31 NOTE — PROGRESS NOTES
RN started amio continuous gtt per cardio orders. About an hour and a half after gtt started, pt began to c/o same symptoms as she had when she got the amio bolus. Pt was warm, flushed, diaphoretic, nauseous and dry heaving. Also c/o new onset of Right shoulder/neck pain rating it a 6/10. RN stopped gtt immediately and informed attending and cardiology. New orders received. Stat EKG performed, Trops drawn, and ordered meds given, refer to MAR.

## 2024-08-31 NOTE — PLAN OF CARE
Problem: Discharge Planning  Goal: Discharge to home or other facility with appropriate resources  8/31/2024 0131 by Pascale Gaines RN  Outcome: Progressing     Problem: ABCDS Injury Assessment  Goal: Absence of physical injury  8/31/2024 0131 by Pascale Gaines RN  Outcome: Progressing     Problem: Chronic Conditions and Co-morbidities  Goal: Patient's chronic conditions and co-morbidity symptoms are monitored and maintained or improved  8/31/2024 0131 by Pascale Gaines, RN  Outcome: Progressing

## 2024-08-31 NOTE — PROGRESS NOTES
RN administered Amio bolus per cardiology orders. Pt became very warm, flushed and nauseous. RN took pt vitals and BP was 77/60. RN notified attending and cardiology. Amio stopped, Bolus hung and IV benadryl given. Refer to MAR orders.

## 2024-08-31 NOTE — PROGRESS NOTES
Pharmacy Note  Warfarin Consult follow-up      Recent Labs     08/31/24  0625   INR 1.6     Recent Labs     08/30/24  0041 08/31/24  0625   HGB 13.7 12.7   HCT 41.6 38.6    149       Significant Drug-Drug Interactions:  APAP      Notes:    INR sub-therapeutic today. Will give an increase in dose this evening of warfarin 2.5mg PO, 8/31/24                   Daily PT/INR while inpatient.          Thank you for the consult,    Minh Leavitt,   PharmD  8/31/2024 8:59 AM

## 2024-08-31 NOTE — PROGRESS NOTES
Cardiology Progress Note                     Date:   8/31/2024  Patient name: Tracee Riggins  Date of admission:  8/29/2024 11:43 PM  MRN:   [id number]  YOB: 1940  PCP: Beverly Ventura DO    Reason for Admission:  atrial fibrillation     Subjective:       Patient went back into atrial fib with rvr last evening after ambulating to bathroom. Received toprol xl this am.   Currently remains in atrial fib   Hr 100-120 AT REST   Denies any chest pain or dyspnea      Scheduled Meds:   warfarin  2.5 mg Oral Once    amiodarone  150 mg IntraVENous Once    sodium chloride  80 mL IntraVENous Once    sodium chloride flush  5-40 mL IntraVENous 2 times per day    melatonin  3 mg Oral Nightly    famotidine  20 mg Oral Daily    warfarin placeholder: dosing by pharmacy   Other RX Placeholder    metoprolol succinate  25 mg Oral Daily       Continuous Infusions:   amiodarone      Followed by    amiodarone      sodium chloride      sodium chloride 50 mL/hr at 08/31/24 0248    dilTIAZem 125 mg in sodium chloride 0.9 % 125 mL infusion Stopped (08/30/24 1102)       Labs:     CBC:   Recent Labs     08/30/24  0041 08/31/24  0625   WBC 7.3 5.1   HGB 13.7 12.7    149     BMP:    Recent Labs     08/30/24  0041 08/30/24  1024    137   K 4.2 4.8    101   CO2 25 28   BUN 40* 37*   CREATININE 1.3* 1.3*   GLUCOSE 150* 189*     Hepatic:   Recent Labs     08/30/24  1024   AST 33*   ALT 29   BILITOT 0.9   ALKPHOS 143*     Troponin: No results for input(s): \"TROPONINI\" in the last 72 hours.  BNP: No results for input(s): \"BNP\" in the last 72 hours.  Lipids: No results for input(s): \"CHOL\", \"HDL\" in the last 72 hours.    Invalid input(s): \"LDLCALCU\"  INR:   Recent Labs     08/30/24  0041 08/31/24  0625   INR 2.2 1.6         Objective:     Vitals: /84   Pulse (!) 115   Temp 98 °F (36.7 °C) (Oral)   Resp 18   Ht 1.295 m (4' 3\")   Wt 39.5 kg (87 lb 1.3 oz)   SpO2 97%   BMI 23.54 kg/m²

## 2024-09-01 VITALS
OXYGEN SATURATION: 95 % | DIASTOLIC BLOOD PRESSURE: 86 MMHG | RESPIRATION RATE: 16 BRPM | HEART RATE: 88 BPM | HEIGHT: 55 IN | WEIGHT: 87.08 LBS | BODY MASS INDEX: 20.15 KG/M2 | SYSTOLIC BLOOD PRESSURE: 122 MMHG | TEMPERATURE: 97.6 F

## 2024-09-01 PROBLEM — I48.91 ATRIAL FIBRILLATION WITH RVR (HCC): Status: RESOLVED | Noted: 2024-08-30 | Resolved: 2024-09-01

## 2024-09-01 PROBLEM — I48.92 ATRIAL FLUTTER WITH RAPID VENTRICULAR RESPONSE (HCC): Status: RESOLVED | Noted: 2024-08-30 | Resolved: 2024-09-01

## 2024-09-01 LAB
ALBUMIN SERPL-MCNC: 3.1 G/DL (ref 3.5–5.2)
ALBUMIN/GLOB SERPL: 1.3 {RATIO} (ref 1–2.5)
ALP SERPL-CCNC: 121 U/L (ref 35–104)
ALT SERPL-CCNC: 29 U/L (ref 5–33)
ANION GAP SERPL CALCULATED.3IONS-SCNC: 10 MMOL/L (ref 9–17)
AST SERPL-CCNC: 42 U/L
BASOPHILS # BLD: 0.1 K/UL (ref 0–0.2)
BASOPHILS NFR BLD: 1 % (ref 0–2)
BILIRUB SERPL-MCNC: 0.7 MG/DL (ref 0.3–1.2)
BUN SERPL-MCNC: 42 MG/DL (ref 8–23)
CALCIUM SERPL-MCNC: 8.7 MG/DL (ref 8.6–10.4)
CHLORIDE SERPL-SCNC: 105 MMOL/L (ref 98–107)
CO2 SERPL-SCNC: 25 MMOL/L (ref 20–31)
CREAT SERPL-MCNC: 1.5 MG/DL (ref 0.5–0.9)
EOSINOPHIL # BLD: 0.1 K/UL (ref 0–0.4)
EOSINOPHILS RELATIVE PERCENT: 1 % (ref 1–4)
ERYTHROCYTE [DISTWIDTH] IN BLOOD BY AUTOMATED COUNT: 14.1 % (ref 12.5–15.4)
GFR, ESTIMATED: 34 ML/MIN/1.73M2
GLUCOSE SERPL-MCNC: 96 MG/DL (ref 70–99)
HCT VFR BLD AUTO: 38.1 % (ref 36–46)
HGB BLD-MCNC: 12.3 G/DL (ref 12–16)
INR PPP: 2.1
LYMPHOCYTES NFR BLD: 1.6 K/UL (ref 1–4.8)
LYMPHOCYTES RELATIVE PERCENT: 28 % (ref 24–44)
MCH RBC QN AUTO: 30 PG (ref 26–34)
MCHC RBC AUTO-ENTMCNC: 32.4 G/DL (ref 31–37)
MCV RBC AUTO: 92.4 FL (ref 80–100)
MONOCYTES NFR BLD: 0.6 K/UL (ref 0.1–1.2)
MONOCYTES NFR BLD: 10 % (ref 2–11)
NEUTROPHILS NFR BLD: 60 % (ref 36–66)
NEUTS SEG NFR BLD: 3.3 K/UL (ref 1.8–7.7)
PLATELET # BLD AUTO: 126 K/UL (ref 140–450)
PMV BLD AUTO: 9.8 FL (ref 6–12)
POTASSIUM SERPL-SCNC: 4.6 MMOL/L (ref 3.7–5.3)
PROT SERPL-MCNC: 5.5 G/DL (ref 6.4–8.3)
PROTHROMBIN TIME: 21.2 SEC (ref 9.4–12.6)
RBC # BLD AUTO: 4.12 M/UL (ref 4–5.2)
SODIUM SERPL-SCNC: 140 MMOL/L (ref 135–144)
WBC OTHER # BLD: 5.6 K/UL (ref 3.5–11)

## 2024-09-01 PROCEDURE — 6370000000 HC RX 637 (ALT 250 FOR IP): Performed by: FAMILY MEDICINE

## 2024-09-01 PROCEDURE — 80053 COMPREHEN METABOLIC PANEL: CPT

## 2024-09-01 PROCEDURE — 85025 COMPLETE CBC W/AUTO DIFF WBC: CPT

## 2024-09-01 PROCEDURE — 2580000003 HC RX 258: Performed by: FAMILY MEDICINE

## 2024-09-01 PROCEDURE — 36415 COLL VENOUS BLD VENIPUNCTURE: CPT

## 2024-09-01 PROCEDURE — 99239 HOSP IP/OBS DSCHRG MGMT >30: CPT | Performed by: FAMILY MEDICINE

## 2024-09-01 PROCEDURE — 85610 PROTHROMBIN TIME: CPT

## 2024-09-01 RX ORDER — FAMOTIDINE 20 MG/1
20 TABLET, FILM COATED ORAL DAILY
Qty: 30 TABLET | Refills: 0 | Status: SHIPPED | OUTPATIENT
Start: 2024-09-02

## 2024-09-01 RX ORDER — WARFARIN SODIUM 1 MG/1
2 TABLET ORAL
Status: DISCONTINUED | OUTPATIENT
Start: 2024-09-01 | End: 2024-09-01 | Stop reason: HOSPADM

## 2024-09-01 RX ORDER — METOPROLOL SUCCINATE 25 MG/1
25 TABLET, EXTENDED RELEASE ORAL DAILY
Qty: 30 TABLET | Refills: 3 | Status: SHIPPED | OUTPATIENT
Start: 2024-09-02

## 2024-09-01 RX ADMIN — METOPROLOL SUCCINATE 25 MG: 25 TABLET, EXTENDED RELEASE ORAL at 08:24

## 2024-09-01 RX ADMIN — FAMOTIDINE 20 MG: 20 TABLET ORAL at 08:24

## 2024-09-01 RX ADMIN — SODIUM CHLORIDE, PRESERVATIVE FREE 10 ML: 5 INJECTION INTRAVENOUS at 08:24

## 2024-09-01 NOTE — PROGRESS NOTES
Pharmacy Note  Warfarin Consult follow-up      Recent Labs     09/01/24  0623   INR 2.1     Recent Labs     08/30/24  0041 08/31/24  0625 09/01/24  0623   HGB 13.7 12.7 12.3   HCT 41.6 38.6 38.1    149 126*       Significant Drug-Drug Interactions:  APAP, amiodarone (started then stopped due to possible reaction)      Notes:  INR within therapeutic goal range. Will give scheduled home dose of warfarin 2mg PO this evening, 9/1/24                   Daily PT/INR while inpatient.       Thank you for the consult,    Minh Leavitt,   PharmD  9/1/2024 9:01 AM

## 2024-09-01 NOTE — DISCHARGE INSTRUCTIONS
Get bmp /lab draw in one week, nonfasting at a [facility] lab/they will have order in system  Get INR drawn on Wednesday 9/4/24  The pepcid (famotidine) Rx can be taken as needed for heartburn/stomach acid symptoms

## 2024-09-01 NOTE — PLAN OF CARE
Problem: Discharge Planning  Goal: Discharge to home or other facility with appropriate resources  9/1/2024 1144 by Fito Hurtado RN  Outcome: Progressing     Problem: ABCDS Injury Assessment  Goal: Absence of physical injury  9/1/2024 1144 by Fito Hurtado RN  Outcome: Progressing     Problem: Chronic Conditions and Co-morbidities  Goal: Patient's chronic conditions and co-morbidity symptoms are monitored and maintained or improved  9/1/2024 1144 by Fito Hurtado RN  Outcome: Progressing

## 2024-09-01 NOTE — PLAN OF CARE
Problem: Discharge Planning  Goal: Discharge to home or other facility with appropriate resources  9/1/2024 1144 by Fito Hurtado RN  Outcome: Adequate for Discharge  9/1/2024 1144 by Fito Hurtado RN  Outcome: Progressing  9/1/2024 0147 by Emily Rivera RN  Outcome: Progressing     Problem: ABCDS Injury Assessment  Goal: Absence of physical injury  9/1/2024 1144 by Fito Hurtado RN  Outcome: Adequate for Discharge  9/1/2024 1144 by Fiot Hurtado RN  Outcome: Progressing  9/1/2024 0147 by Emily Rivera RN  Outcome: Progressing     Problem: Chronic Conditions and Co-morbidities  Goal: Patient's chronic conditions and co-morbidity symptoms are monitored and maintained or improved  9/1/2024 1144 by Fito Hurtado RN  Outcome: Adequate for Discharge  9/1/2024 1144 by Fito Hurtado RN  Outcome: Progressing  9/1/2024 0147 by Emily Rivera RN  Outcome: Progressing

## 2024-09-01 NOTE — DISCHARGE SUMMARY
[facility]  IN-PATIENT SERVICE   [facility] - Location: [place]    Discharge Summary     Patient ID: Tracee Riggins  :  1940   MRN: [id number]     ACCOUNT:  [id number]   Patient's PCP: Beverly Ventura DO  Admit Date: 2024   Discharge Date: 2024  Length of Stay: 2  Code Status:  Full Code  Admitting Physician: Torrey Horner MD  Discharge Physician: Marcelino Horner MD     Active Discharge Diagnoses:     Hospital Problem Lists:  Principal Problem (Resolved):    Atrial fibrillation with RVR (HCC)  Active Problems:    * No active hospital problems. *  Resolved Problems:    Atrial flutter with rapid ventricular response (HCC)      Admission Condition:  fair     Discharged Condition: good    Hospital Stay:     Hospital Course:  Tracee Riggins is a 83 y.o. female who was admitted for the management of Atrial fibrillation with RVR (HCC) , presented to ER with Nausea and Dizziness     and is admitted to the hospital for the management of Atrial fibrillation with RVR (HCC).  Patient has been very healthy and has not had any cardiac issues recently at all.  She was out bending over picking up sticks and pulling weeds yesterday morning without any difficulty.  At approximately 10:45 PM last night she had acute vertigo diaphoresis and dry heaves.  She did not have any chest pain nor palpitations.  She was transported to ER by EMS and was found to be in atrial fibs with rapid ventricular response and she was started on Cardizem to control rate also congestive changes were noted on chest x-ray and she was given a dose of Lasix.  Through the night she converted to normal sinus rhythm and she is now on tapering doses of Cardizem.  This morning she feels back to normal and has no complaints.  Her last episode of atrial fibs was approximately a year and a half ago and again this occurred after she was outside bending over doing yard work  In the ER electrolytes were normal random  Moderately elevated RVSP, consistent with moderate pulmonary hypertension. The estimated RVSP is 54 mmHg.    Left Atrium: Left atrium is severely dilated.    Right Atrium: Right atrium is dilated.    IVC/SVC: IVC diameter is greater than 21 mm and decreases less than 50% during inspiration; therefore the estimated right atrial pressure is elevated (~15 mmHg).    Image quality is adequat    Consultations:    Consults:     Final Specialist Recommendations/Findings:   IP CONSULT TO PHARMACY  IP CONSULT TO SPIRITUAL SERVICES  IP CONSULT TO CARDIOLOGY      The patient was seen and examined on day of discharge and this discharge summary is in conjunction with any daily progress note from day of discharge.    Discharge plan:     Disposition: Home    Physician Follow Up:   Beverly Ventura, DO  [address]  [place]  [facility] [address] [phone]    Call in 1 week(s)      Venkata Farmer MD  [address]  [facility] [address] [phone]    Schedule an appointment as soon as possible for a visit in 1 month(s)         Requiring Further Evaluation/Follow Up POST HOSPITALIZATION/Incidental Findings: repeat BMP in one week and INR in 3days    Diet: cardiac diet    Activity: As tolerated    Instructions to Patient:     Discharge Medications:      Medication List        START taking these medications      famotidine 20 MG tablet  Commonly known as: PEPCID  Take 1 tablet by mouth daily For heart burn/stomach acid as needed  Start taking on: September 2, 2024     metoprolol succinate 25 MG extended release tablet  Commonly known as: TOPROL XL  Take 1 tablet by mouth daily  Start taking on: September 2, 2024            CONTINUE taking these medications      warfarin 1 MG tablet  Commonly known as: COUMADIN            STOP taking these medications      Magnesium 125 MG Caps               Where to Get Your Medications        These medications were sent to [id number] - [place], [place] - [address]

## 2024-09-01 NOTE — PLAN OF CARE
Problem: Discharge Planning  Goal: Discharge to home or other facility with appropriate resources  9/1/2024 0147 by Emily Rivrea, RN  Outcome: Progressing     Problem: ABCDS Injury Assessment  Goal: Absence of physical injury  9/1/2024 0147 by Emily Rivera, RN  Outcome: Progressing     Problem: Chronic Conditions and Co-morbidities  Goal: Patient's chronic conditions and co-morbidity symptoms are monitored and maintained or improved  9/1/2024 0147 by Emily Rivera, RN  Outcome: Progressing

## 2024-09-01 NOTE — PROGRESS NOTES
[facility]  IN-PATIENT SERVICE   [facility] - Location: [place]    Progress Note    9/1/2024    10:20 AM    Name:   Tracee Riggins  MRN:     [id number]     Acct:      [id number]   Room:   [facility]/321-01   Day:  2  Admit Date:  8/29/2024 11:43 PM    PCP:   Beverly Ventura DO  Code Status:  Full Code    Subjective:     Patient without any complaints.  She did notice she got a little short of breath when she was up this morning cleaning herself up.  She took deep breaths and it subsided.  There was no lightheadedness or dizziness or chest pain with it.  She did not tolerate amiodarone yesterday, with the IV starting bolus she became hypotensive and had to stop the amiodarone, she was given Benadryl and fluid bolus.  Later on amiodarone drip reattempted but she became nauseous, dry heaves and had chest and shoulder pain which responded to IV Pepcid.  Her EKG at the time showed no acute changes and troponin was stable.  She was given metoprolol this morning and her heart rate is in the 80s currently.  Eating and drinking well.    Medications:     Allergies:    Allergies   Allergen Reactions    Amiodarone Nausea And Vomiting       Current Meds:   Scheduled Meds:    warfarin  2 mg Oral Once    sodium chloride  80 mL IntraVENous Once    sodium chloride flush  5-40 mL IntraVENous 2 times per day    melatonin  3 mg Oral Nightly    famotidine  20 mg Oral Daily    warfarin placeholder: dosing by pharmacy   Other RX Placeholder    metoprolol succinate  25 mg Oral Daily     Continuous Infusions:    sodium chloride      dilTIAZem 125 mg in sodium chloride 0.9 % 125 mL infusion Stopped (08/30/24 1102)     PRN Meds: sodium chloride flush, sodium chloride, ondansetron **OR** ondansetron, polyethylene glycol, acetaminophen **OR** acetaminophen, potassium chloride **OR** potassium alternative oral replacement **OR** potassium chloride, magnesium sulfate, aluminum & magnesium hydroxide-simethicone    Data:

## 2024-09-01 NOTE — PROGRESS NOTES
Pt discharged home. AVS and belongings reviewed with pt. All questions and concerns addressed. IV removed, no distress noted upon discharge. Vitals WNL.

## 2024-09-03 LAB
EKG ATRIAL RATE: 104 BPM
EKG ATRIAL RATE: 81 BPM
EKG P-R INTERVAL: 248 MS
EKG Q-T INTERVAL: 388 MS
EKG Q-T INTERVAL: 442 MS
EKG QRS DURATION: 78 MS
EKG QRS DURATION: 82 MS
EKG QTC CALCULATION (BAZETT): 505 MS
EKG QTC CALCULATION (BAZETT): 513 MS
EKG R AXIS: -4 DEGREES
EKG R AXIS: 20 DEGREES
EKG T AXIS: 87 DEGREES
EKG T AXIS: 99 DEGREES
EKG VENTRICULAR RATE: 102 BPM
EKG VENTRICULAR RATE: 81 BPM

## 2024-09-03 NOTE — PROGRESS NOTES
Physician Progress Note      PATIENT:               AMELIA PARTIDA  CSN #:                  [id number]  :                       1940  ADMIT DATE:       2024 11:43 PM  DISCH DATE:        2024 2:39 PM  RESPONDING  PROVIDER #:        Torrey Horner MD          QUERY TEXT:    Patient admitted with Dizziness, nausea ,palpitations. Known to have atrial   fibrillation and is maintained on Coumadin. If possible, please document in   progress notes and discharge summary if you are evaluating and/or treating any   of the following:?  ?  The medical record reflects the following:  Risk Factors: Hx PAF, a flutter  Clinical Indicators: To ED with Nausea and dizziness. Found to have   Afib/flutter-started on Cardizem gtt. Per H&P has Hx PAF maintained on Chronic   Coumadin. Per Cardio consult '  Atrial flutter with rapid ventricular   response, likely triggered by dehydration, currently back in normal sinus   rhythm.'  Treatment: Cardio consult, Resume home med Coumadin 2mg  Options provided:  -- Secondary hypercoagulable state in a patient with atrial fibrillation  -- Other - I will add my own diagnosis  -- Disagree - Not applicable / Not valid  -- Disagree - Clinically unable to determine / Unknown  -- Refer to Clinical Documentation Reviewer    PROVIDER RESPONSE TEXT:    This patient has secondary hypercoagulable state in a patient with atrial   fibrillation.    Query created by: Jeanine Son on 2024 1:32 PM      Electronically signed by:  Torrey Horner MD 9/3/2024 8:25 AM

## 2024-09-04 ENCOUNTER — TELEPHONE (OUTPATIENT)
Age: 84
End: 2024-09-04

## 2024-09-04 DIAGNOSIS — N18.32 STAGE 3B CHRONIC KIDNEY DISEASE (HCC): Primary | ICD-10-CM

## 2024-09-04 NOTE — TELEPHONE ENCOUNTER
Care Transitions Initial Follow Up Call    Outreach made within 2 business days of discharge: Yes    Patient: Tracee Riggins Patient : 1940   MRN: [id number]  Reason for Admission: PE  Discharge Date: 24       Spoke with:     Discharge department/facility: [place]      Left message for patient to call back to schedule    Additional needs identified to be addressed with provider               Scheduled appointment with PCP within 7-14 days    Follow Up  Future Appointments   Date Time Provider Department Center   2024 10:20 AM Beverly Ventura, DO [facility]   2024 10:30 AM Deb Reza, APRN - CNP [facility] None       HEMANTH CAMPOS MA

## 2024-09-05 ENCOUNTER — TELEPHONE (OUTPATIENT)
Age: 84
End: 2024-09-05

## 2024-09-05 NOTE — TELEPHONE ENCOUNTER
Patient called to make a post hospital appt with you on 9/12, patient was d/c from [facility] on Sunday 9/01 for her heart.  She wanted to let you know that sometimes she is getting a weak feeling, a little flutter and then it subsides but wanted to make you aware of this

## 2024-09-12 ENCOUNTER — OFFICE VISIT (OUTPATIENT)
Age: 84
End: 2024-09-12

## 2024-09-12 VITALS
RESPIRATION RATE: 12 BRPM | BODY MASS INDEX: 22.04 KG/M2 | OXYGEN SATURATION: 100 % | WEIGHT: 98 LBS | DIASTOLIC BLOOD PRESSURE: 62 MMHG | HEART RATE: 99 BPM | SYSTOLIC BLOOD PRESSURE: 100 MMHG | HEIGHT: 56 IN

## 2024-09-12 DIAGNOSIS — I48.91 ATRIAL FIBRILLATION/FLUTTER (HCC): Primary | ICD-10-CM

## 2024-09-12 DIAGNOSIS — N18.32 STAGE 3B CHRONIC KIDNEY DISEASE (HCC): ICD-10-CM

## 2024-09-12 DIAGNOSIS — Z09 HOSPITAL DISCHARGE FOLLOW-UP: ICD-10-CM

## 2024-09-12 DIAGNOSIS — I48.92 ATRIAL FIBRILLATION/FLUTTER (HCC): Primary | ICD-10-CM

## 2024-09-12 DIAGNOSIS — Z79.01 LONG TERM (CURRENT) USE OF ANTICOAGULANTS: ICD-10-CM

## 2024-09-12 DIAGNOSIS — D69.6 THROMBOCYTOPENIA (HCC): ICD-10-CM

## 2024-09-12 DIAGNOSIS — I50.23 ACUTE ON CHRONIC CLINICAL SYSTOLIC HEART FAILURE (HCC): ICD-10-CM

## 2024-09-12 DIAGNOSIS — K55.1 SUPERIOR MESENTERIC ARTERY SYNDROME (HCC): ICD-10-CM

## 2024-09-12 RX ORDER — FUROSEMIDE 20 MG
TABLET ORAL
Qty: 30 TABLET | Refills: 0 | Status: SHIPPED | OUTPATIENT
Start: 2024-09-12

## 2024-09-20 ENCOUNTER — HOSPITAL ENCOUNTER (OUTPATIENT)
Age: 84
Setting detail: SPECIMEN
Discharge: HOME | End: 2024-09-20

## 2024-09-20 DIAGNOSIS — N18.32 STAGE 3B CHRONIC KIDNEY DISEASE (HCC): ICD-10-CM

## 2024-09-20 LAB
ANION GAP SERPL CALCULATED.3IONS-SCNC: 10 MMOL/L (ref 9–16)
BUN SERPL-MCNC: 39 MG/DL (ref 8–23)
CALCIUM SERPL-MCNC: 8.7 MG/DL (ref 8.6–10.4)
CHLORIDE SERPL-SCNC: 102 MMOL/L (ref 98–107)
CO2 SERPL-SCNC: 27 MMOL/L (ref 20–31)
CREAT SERPL-MCNC: 1.4 MG/DL (ref 0.5–0.9)
GFR, ESTIMATED: 38 ML/MIN/1.73M2
GLUCOSE SERPL-MCNC: 87 MG/DL (ref 74–99)
POTASSIUM SERPL-SCNC: 5.2 MMOL/L (ref 3.7–5.3)
SODIUM SERPL-SCNC: 139 MMOL/L (ref 136–145)

## 2024-09-23 ENCOUNTER — OFFICE VISIT (OUTPATIENT)
Age: 84
End: 2024-09-23
Payer: MEDICARE

## 2024-09-23 VITALS
DIASTOLIC BLOOD PRESSURE: 80 MMHG | SYSTOLIC BLOOD PRESSURE: 128 MMHG | BODY MASS INDEX: 21.91 KG/M2 | OXYGEN SATURATION: 99 % | TEMPERATURE: 97.7 F | HEART RATE: 96 BPM | HEIGHT: 56 IN | WEIGHT: 97.4 LBS

## 2024-09-23 DIAGNOSIS — I50.23 ACUTE ON CHRONIC CLINICAL SYSTOLIC HEART FAILURE (HCC): ICD-10-CM

## 2024-09-23 DIAGNOSIS — I48.91 ATRIAL FIBRILLATION/FLUTTER (HCC): Primary | ICD-10-CM

## 2024-09-23 DIAGNOSIS — I48.92 ATRIAL FIBRILLATION/FLUTTER (HCC): Primary | ICD-10-CM

## 2024-09-23 DIAGNOSIS — N18.32 STAGE 3B CHRONIC KIDNEY DISEASE (HCC): ICD-10-CM

## 2024-09-23 PROCEDURE — 1111F DSCHRG MED/CURRENT MED MERGE: CPT | Performed by: FAMILY MEDICINE

## 2024-09-23 PROCEDURE — 1090F PRES/ABSN URINE INCON ASSESS: CPT | Performed by: FAMILY MEDICINE

## 2024-09-23 PROCEDURE — G8420 CALC BMI NORM PARAMETERS: HCPCS | Performed by: FAMILY MEDICINE

## 2024-09-23 PROCEDURE — G8427 DOCREV CUR MEDS BY ELIG CLIN: HCPCS | Performed by: FAMILY MEDICINE

## 2024-09-23 PROCEDURE — 1036F TOBACCO NON-USER: CPT | Performed by: FAMILY MEDICINE

## 2024-09-23 PROCEDURE — 3074F SYST BP LT 130 MM HG: CPT | Performed by: FAMILY MEDICINE

## 2024-09-23 PROCEDURE — G8400 PT W/DXA NO RESULTS DOC: HCPCS | Performed by: FAMILY MEDICINE

## 2024-09-23 PROCEDURE — 3079F DIAST BP 80-89 MM HG: CPT | Performed by: FAMILY MEDICINE

## 2024-09-23 PROCEDURE — 99213 OFFICE O/P EST LOW 20 MIN: CPT | Performed by: FAMILY MEDICINE

## 2024-09-23 PROCEDURE — 1123F ACP DISCUSS/DSCN MKR DOCD: CPT | Performed by: FAMILY MEDICINE

## 2024-09-27 ENCOUNTER — APPOINTMENT (OUTPATIENT)
Dept: GENERAL RADIOLOGY | Age: 84
DRG: 291 | End: 2024-09-27
Payer: MEDICARE

## 2024-09-27 ENCOUNTER — HOSPITAL ENCOUNTER (INPATIENT)
Age: 84
LOS: 7 days | Discharge: HOME | DRG: 291 | End: 2024-10-04
Attending: HOSPITALIST | Admitting: FAMILY MEDICINE
Payer: MEDICARE

## 2024-09-27 DIAGNOSIS — N18.9 ACUTE KIDNEY INJURY SUPERIMPOSED ON CKD (HCC): ICD-10-CM

## 2024-09-27 DIAGNOSIS — I50.9 ACUTE CONGESTIVE HEART FAILURE, UNSPECIFIED HEART FAILURE TYPE (HCC): Primary | ICD-10-CM

## 2024-09-27 DIAGNOSIS — I48.91 ATRIAL FIBRILLATION (HCC): ICD-10-CM

## 2024-09-27 DIAGNOSIS — N18.32 STAGE 3B CHRONIC KIDNEY DISEASE (HCC): ICD-10-CM

## 2024-09-27 DIAGNOSIS — R79.89 ELEVATED TROPONIN: ICD-10-CM

## 2024-09-27 DIAGNOSIS — R93.2 ABNORMAL GALL BLADDER DIAGNOSTIC IMAGING: ICD-10-CM

## 2024-09-27 DIAGNOSIS — N17.9 ACUTE KIDNEY INJURY SUPERIMPOSED ON CKD (HCC): ICD-10-CM

## 2024-09-27 DIAGNOSIS — I48.20 CHRONIC ATRIAL FIBRILLATION (HCC): ICD-10-CM

## 2024-09-27 PROBLEM — I50.23 ACUTE ON CHRONIC CLINICAL SYSTOLIC HEART FAILURE (HCC): Status: ACTIVE | Noted: 2024-09-27

## 2024-09-27 LAB
ANION GAP SERPL CALCULATED.3IONS-SCNC: 11 MMOL/L (ref 9–17)
BASOPHILS # BLD: 0.1 K/UL (ref 0–0.2)
BASOPHILS NFR BLD: 1 % (ref 0–2)
BNP SERPL-MCNC: 7599 PG/ML
BUN SERPL-MCNC: 52 MG/DL (ref 8–23)
CALCIUM SERPL-MCNC: 9.1 MG/DL (ref 8.6–10.4)
CHLORIDE SERPL-SCNC: 98 MMOL/L (ref 98–107)
CO2 SERPL-SCNC: 25 MMOL/L (ref 20–31)
CREAT SERPL-MCNC: 1.6 MG/DL (ref 0.5–0.9)
EOSINOPHIL # BLD: 0.1 K/UL (ref 0–0.4)
EOSINOPHILS RELATIVE PERCENT: 1 % (ref 1–4)
ERYTHROCYTE [DISTWIDTH] IN BLOOD BY AUTOMATED COUNT: 14.4 % (ref 12.5–15.4)
GFR, ESTIMATED: 31 ML/MIN/1.73M2
GLUCOSE SERPL-MCNC: 104 MG/DL (ref 70–99)
HCT VFR BLD AUTO: 42.3 % (ref 36–46)
HGB BLD-MCNC: 13.6 G/DL (ref 12–16)
LYMPHOCYTES NFR BLD: 1.6 K/UL (ref 1–4.8)
LYMPHOCYTES RELATIVE PERCENT: 30 % (ref 24–44)
MCH RBC QN AUTO: 28.6 PG (ref 26–34)
MCHC RBC AUTO-ENTMCNC: 32 G/DL (ref 31–37)
MCV RBC AUTO: 89.3 FL (ref 80–100)
MONOCYTES NFR BLD: 0.4 K/UL (ref 0.1–1.2)
MONOCYTES NFR BLD: 8 % (ref 2–11)
NEUTROPHILS NFR BLD: 60 % (ref 36–66)
NEUTS SEG NFR BLD: 3.2 K/UL (ref 1.8–7.7)
PLATELET # BLD AUTO: 154 K/UL (ref 140–450)
PMV BLD AUTO: 10 FL (ref 6–12)
POTASSIUM SERPL-SCNC: 4.7 MMOL/L (ref 3.7–5.3)
RBC # BLD AUTO: 4.74 M/UL (ref 4–5.2)
SODIUM SERPL-SCNC: 134 MMOL/L (ref 135–144)
TROPONIN I SERPL HS-MCNC: 32 NG/L (ref 0–14)
TROPONIN I SERPL HS-MCNC: 33 NG/L (ref 0–14)
WBC OTHER # BLD: 5.4 K/UL (ref 3.5–11)

## 2024-09-27 PROCEDURE — 80048 BASIC METABOLIC PNL TOTAL CA: CPT

## 2024-09-27 PROCEDURE — 6360000002 HC RX W HCPCS: Performed by: NURSE PRACTITIONER

## 2024-09-27 PROCEDURE — 99223 1ST HOSP IP/OBS HIGH 75: CPT | Performed by: NURSE PRACTITIONER

## 2024-09-27 PROCEDURE — 71045 X-RAY EXAM CHEST 1 VIEW: CPT

## 2024-09-27 PROCEDURE — 6360000002 HC RX W HCPCS

## 2024-09-27 PROCEDURE — 85025 COMPLETE CBC W/AUTO DIFF WBC: CPT

## 2024-09-27 PROCEDURE — 83880 ASSAY OF NATRIURETIC PEPTIDE: CPT

## 2024-09-27 PROCEDURE — 36415 COLL VENOUS BLD VENIPUNCTURE: CPT

## 2024-09-27 PROCEDURE — 2580000003 HC RX 258: Performed by: NURSE PRACTITIONER

## 2024-09-27 PROCEDURE — 2060000000 HC ICU INTERMEDIATE R&B

## 2024-09-27 PROCEDURE — 84484 ASSAY OF TROPONIN QUANT: CPT

## 2024-09-27 PROCEDURE — 99285 EMERGENCY DEPT VISIT HI MDM: CPT

## 2024-09-27 PROCEDURE — 93005 ELECTROCARDIOGRAM TRACING: CPT

## 2024-09-27 RX ORDER — SODIUM CHLORIDE 0.9 % (FLUSH) 0.9 %
10 SYRINGE (ML) INJECTION PRN
Status: DISCONTINUED | OUTPATIENT
Start: 2024-09-27 | End: 2024-10-04 | Stop reason: HOSPADM

## 2024-09-27 RX ORDER — SPIRONOLACTONE 25 MG/1
25 TABLET ORAL DAILY
Status: DISCONTINUED | OUTPATIENT
Start: 2024-09-27 | End: 2024-10-03

## 2024-09-27 RX ORDER — SODIUM CHLORIDE 0.9 % (FLUSH) 0.9 %
5-40 SYRINGE (ML) INJECTION EVERY 12 HOURS SCHEDULED
Status: DISCONTINUED | OUTPATIENT
Start: 2024-09-27 | End: 2024-10-04 | Stop reason: HOSPADM

## 2024-09-27 RX ORDER — ACETAMINOPHEN 650 MG/1
650 SUPPOSITORY RECTAL EVERY 6 HOURS PRN
Status: DISCONTINUED | OUTPATIENT
Start: 2024-09-27 | End: 2024-10-04 | Stop reason: HOSPADM

## 2024-09-27 RX ORDER — HEPARIN SODIUM 5000 [USP'U]/ML
5000 INJECTION, SOLUTION INTRAVENOUS; SUBCUTANEOUS 2 TIMES DAILY
Status: DISCONTINUED | OUTPATIENT
Start: 2024-09-27 | End: 2024-09-28

## 2024-09-27 RX ORDER — ONDANSETRON 4 MG/1
4 TABLET, ORALLY DISINTEGRATING ORAL EVERY 8 HOURS PRN
Status: DISCONTINUED | OUTPATIENT
Start: 2024-09-27 | End: 2024-10-04 | Stop reason: HOSPADM

## 2024-09-27 RX ORDER — ACETAMINOPHEN 325 MG/1
650 TABLET ORAL EVERY 6 HOURS PRN
Status: DISCONTINUED | OUTPATIENT
Start: 2024-09-27 | End: 2024-10-04 | Stop reason: HOSPADM

## 2024-09-27 RX ORDER — POTASSIUM CHLORIDE 1500 MG/1
40 TABLET, EXTENDED RELEASE ORAL PRN
Status: DISCONTINUED | OUTPATIENT
Start: 2024-09-27 | End: 2024-09-27

## 2024-09-27 RX ORDER — POLYETHYLENE GLYCOL 3350 17 G/17G
17 POWDER, FOR SOLUTION ORAL DAILY PRN
Status: DISCONTINUED | OUTPATIENT
Start: 2024-09-27 | End: 2024-10-04 | Stop reason: HOSPADM

## 2024-09-27 RX ORDER — ONDANSETRON 2 MG/ML
4 INJECTION INTRAMUSCULAR; INTRAVENOUS EVERY 6 HOURS PRN
Status: DISCONTINUED | OUTPATIENT
Start: 2024-09-27 | End: 2024-10-04 | Stop reason: HOSPADM

## 2024-09-27 RX ORDER — METOPROLOL SUCCINATE 25 MG/1
25 TABLET, EXTENDED RELEASE ORAL DAILY
Status: DISCONTINUED | OUTPATIENT
Start: 2024-09-27 | End: 2024-09-28

## 2024-09-27 RX ORDER — FAMOTIDINE 20 MG/1
20 TABLET, FILM COATED ORAL DAILY
Status: DISCONTINUED | OUTPATIENT
Start: 2024-09-27 | End: 2024-10-04 | Stop reason: HOSPADM

## 2024-09-27 RX ORDER — SODIUM CHLORIDE 9 MG/ML
INJECTION, SOLUTION INTRAVENOUS PRN
Status: DISCONTINUED | OUTPATIENT
Start: 2024-09-27 | End: 2024-10-04 | Stop reason: HOSPADM

## 2024-09-27 RX ORDER — LISINOPRIL 5 MG/1
5 TABLET ORAL DAILY
Status: CANCELLED | OUTPATIENT
Start: 2024-09-28

## 2024-09-27 RX ORDER — FUROSEMIDE 10 MG/ML
20 INJECTION INTRAMUSCULAR; INTRAVENOUS ONCE
Status: COMPLETED | OUTPATIENT
Start: 2024-09-27 | End: 2024-09-27

## 2024-09-27 RX ORDER — WARFARIN SODIUM 1 MG/1
2 TABLET ORAL DAILY
Status: DISCONTINUED | OUTPATIENT
Start: 2024-09-27 | End: 2024-09-27

## 2024-09-27 RX ORDER — FUROSEMIDE 10 MG/ML
40 INJECTION INTRAMUSCULAR; INTRAVENOUS 2 TIMES DAILY
Status: DISCONTINUED | OUTPATIENT
Start: 2024-09-27 | End: 2024-09-29

## 2024-09-27 RX ORDER — POTASSIUM CHLORIDE 7.45 MG/ML
10 INJECTION INTRAVENOUS PRN
Status: DISCONTINUED | OUTPATIENT
Start: 2024-09-27 | End: 2024-09-27

## 2024-09-27 RX ADMIN — HEPARIN SODIUM 5000 UNITS: 5000 INJECTION INTRAVENOUS; SUBCUTANEOUS at 21:31

## 2024-09-27 RX ADMIN — SODIUM CHLORIDE, PRESERVATIVE FREE 10 ML: 5 INJECTION INTRAVENOUS at 21:36

## 2024-09-27 RX ADMIN — FUROSEMIDE 40 MG: 10 INJECTION, SOLUTION INTRAMUSCULAR; INTRAVENOUS at 21:35

## 2024-09-27 RX ADMIN — FUROSEMIDE 20 MG: 10 INJECTION, SOLUTION INTRAMUSCULAR; INTRAVENOUS at 19:23

## 2024-09-27 ASSESSMENT — PAIN - FUNCTIONAL ASSESSMENT: PAIN_FUNCTIONAL_ASSESSMENT: NONE - DENIES PAIN

## 2024-09-27 ASSESSMENT — LIFESTYLE VARIABLES: HOW OFTEN DO YOU HAVE A DRINK CONTAINING ALCOHOL: NEVER

## 2024-09-27 ASSESSMENT — PAIN SCALES - GENERAL: PAINLEVEL_OUTOF10: 0

## 2024-09-27 NOTE — ED PROVIDER NOTES
[address]  EMERGENCY DEPARTMENT ENCOUNTER      Pt Name: Tracee Riggins  MRN: [id number]  Birthdate 1940  Date of evaluation: 9/27/2024  Provider: FAM Murray CNP  12:26 PM    CHIEF COMPLAINT       Chief Complaint   Patient presents with    Shortness of Breath     Shortness of breath worsening today - NL SOB; no chest pain.  Leg swelling comes and goes.          HISTORY OF PRESENT ILLNESS    Tracee Riggins is a 83 y.o. female who presents to the emergency department with sob, fatigue and LE edema     HPI  This 83-year-old female who presents with worsening shortness of breath and lower extremity edema.  She has history of A-fib and hypertension.  She is taking diuretics every other day however over the last week she has had significant lower extremity edema from her feet all the way to her thighs and worsening shortness of breath worse with movement and exertion  Her shortness of breath is improved with rest  Nursing Notes were reviewed.    REVIEW OF SYSTEMS       Review of Systems   Constitutional:  Positive for fatigue. Negative for activity change, chills and fever.   HENT:  Negative for congestion, ear pain, rhinorrhea and sore throat.    Respiratory:  Positive for shortness of breath. Negative for cough.    Cardiovascular:  Positive for leg swelling. Negative for chest pain.   Gastrointestinal:  Negative for abdominal pain, diarrhea, nausea and vomiting.   Genitourinary:  Negative for difficulty urinating and pelvic pain.   Musculoskeletal:  Negative for arthralgias, joint swelling and myalgias.   Skin:  Negative for color change.   Neurological:  Negative for dizziness, numbness and headaches.       Except as noted above the remainder of the review of systems was reviewed and negative.       PAST MEDICAL HISTORY     Past Medical History:   Diagnosis Date    Chronic kidney disease     Gout 12/2019    Gout 05/2020    Hypertension     Lumbar disc disease     Thrombocytopenia (HCC)

## 2024-09-27 NOTE — H&P
[facility]  Office: [phone]  Anthony Srinivasan, DO, Adrien Persaud, DO, Naveen Castillo DO, Tino Rose, DO, Regine Lezama MD, Chanel Zheng MD, Corey Coles MD, Rosa Cates MD,  Canelo Levin MD, Lili Crawford MD, Rober Saab MD,  Srinivasa Simms DO, Gerald Escalona MD, Gabriel Cordon MD, Rene Srinivasan DO, Zarina Lamas MD,  Warren Trimble DO, Lennie Soto MD, Roselia Kong MD, Radha Farris MD, Deloris Woody MD,  Lamin Fernández MD, Jayro Steven MD, Singh Taylor MD, Bryce Elizabeth MD, Ted Mora MD, Brian Dubon MD, Greg Barrios, DO, Kristian Edge DO, Reji Davila DO, Tamir Gallagher MD, Shirley Waterhouse, CNP,  Cookie Mathews CNP, Greg Bush, CNP,  Gay Collado, DNP, Corinna Montero, CNP, Sabi Beltran, CNP, Marta Martinez, CNP, Lelia Rehman, CNP, Kera Madison, PA-C, Cecily Dugan PA-C, Estefani Meza, CNP, Sabrina Hobson, CNP,  Maude Figueroa, CNP, Larissa Booth, CNP, Nikki Siddiqui, CNP, Faina Pardaa, CNS, Cydney Jaime, CNP, Verónica Hernandez, CNP, Tracy Schwab, CNP         [facility]   IN-PATIENT SERVICE   [facility]    HISTORY AND PHYSICAL EXAMINATION            Date:   9/27/2024  Patient name:  Tracee Riggins  Date of admission:  9/27/2024  4:58 PM  MRN:   [id number]  Account:  [id number]  YOB: 1940  PCP:    Beverly Ventura DO  [facility]   [facility]  Code Status:    Full Code    Chief Complaint:     Chief Complaint   Patient presents with    Shortness of Breath     Shortness of breath worsening today - NL SOB; no chest pain.  Leg swelling comes and goes.        History Obtained From:     patient    History of Present Illness:     Tracee Riggins is a 83 y.o. Non- / non  female who presents with Shortness of Breath (Shortness of breath worsening today - NL SOB; no chest pain.  Leg swelling comes and goes. )   and is admitted to the hospital for the management of Acute on chronic clinical  equal, round, and reactive to light.   Cardiovascular:      Rate and Rhythm: Regular rhythm. Tachycardia present.      Pulses: Normal pulses.      Heart sounds: Normal heart sounds. No murmur heard.  Pulmonary:      Effort: Pulmonary effort is normal. No respiratory distress.      Breath sounds: Rales present.   Abdominal:      General: Abdomen is flat. Bowel sounds are normal. There is no distension.      Palpations: Abdomen is soft.      Tenderness: There is no abdominal tenderness.   Musculoskeletal:         General: No swelling or tenderness. Normal range of motion.      Cervical back: Normal range of motion and neck supple.      Right lower leg: Edema present.      Left lower leg: Edema present.   Skin:     General: Skin is warm and dry.      Capillary Refill: Capillary refill takes less than 2 seconds.      Coloration: Skin is not pale.   Neurological:      General: No focal deficit present.      Mental Status: She is alert and oriented to person, place, and time. Mental status is at baseline.   Psychiatric:         Mood and Affect: Mood normal.         Behavior: Behavior normal.         Thought Content: Thought content normal.         Judgment: Judgment normal.         Investigations:      Laboratory Testing:  Recent Results (from the past 24 hour(s))   CBC with Auto Differential    Collection Time: 09/27/24  5:06 PM   Result Value Ref Range    WBC 5.4 3.5 - 11.0 k/uL    RBC 4.74 4.0 - 5.2 m/uL    Hemoglobin 13.6 12.0 - 16.0 g/dL    Hematocrit 42.3 36 - 46 %    MCV 89.3 80 - 100 fL    MCH 28.6 26 - 34 pg    MCHC 32.0 31 - 37 g/dL    RDW 14.4 12.5 - 15.4 %    Platelets 154 140 - 450 k/uL    MPV 10.0 6.0 - 12.0 fL    Neutrophils % 60 36 - 66 %    Lymphocytes % 30 24 - 44 %    Monocytes % 8 2 - 11 %    Eosinophils % 1 1 - 4 %    Basophils % 1 0 - 2 %    Neutrophils Absolute 3.20 1.8 - 7.7 k/uL    Lymphocytes Absolute 1.60 1.0 - 4.8 k/uL    Monocytes Absolute 0.40 0.1 - 1.2 k/uL    Eosinophils Absolute 0.10 0.0 -

## 2024-09-28 ENCOUNTER — APPOINTMENT (OUTPATIENT)
Dept: GENERAL RADIOLOGY | Age: 84
DRG: 291 | End: 2024-09-28
Payer: MEDICARE

## 2024-09-28 LAB
ANION GAP SERPL CALCULATED.3IONS-SCNC: 12 MMOL/L (ref 9–17)
BASOPHILS # BLD: 0.1 K/UL (ref 0–0.2)
BASOPHILS NFR BLD: 2 % (ref 0–2)
BNP SERPL-MCNC: 9418 PG/ML
BUN SERPL-MCNC: 48 MG/DL (ref 8–23)
CALCIUM SERPL-MCNC: 9.2 MG/DL (ref 8.6–10.4)
CHLORIDE SERPL-SCNC: 100 MMOL/L (ref 98–107)
CHOLEST SERPL-MCNC: 152 MG/DL (ref 0–199)
CHOLESTEROL/HDL RATIO: 4
CO2 SERPL-SCNC: 27 MMOL/L (ref 20–31)
CREAT SERPL-MCNC: 1.5 MG/DL (ref 0.5–0.9)
EOSINOPHIL # BLD: 0 K/UL (ref 0–0.4)
EOSINOPHILS RELATIVE PERCENT: 1 % (ref 1–4)
ERYTHROCYTE [DISTWIDTH] IN BLOOD BY AUTOMATED COUNT: 14.5 % (ref 12.5–15.4)
GFR, ESTIMATED: 34 ML/MIN/1.73M2
GLUCOSE SERPL-MCNC: 105 MG/DL (ref 70–99)
HCT VFR BLD AUTO: 43.4 % (ref 36–46)
HDLC SERPL-MCNC: 39 MG/DL
HGB BLD-MCNC: 14 G/DL (ref 12–16)
INR PPP: 4.3
IRON SATN MFR SERPL: 7 % (ref 20–55)
IRON SERPL-MCNC: 29 UG/DL (ref 37–145)
LDLC SERPL CALC-MCNC: 95 MG/DL (ref 0–100)
LYMPHOCYTES NFR BLD: 1.7 K/UL (ref 1–4.8)
LYMPHOCYTES RELATIVE PERCENT: 33 % (ref 24–44)
MAGNESIUM SERPL-MCNC: 2.1 MG/DL (ref 1.6–2.6)
MCH RBC QN AUTO: 29.1 PG (ref 26–34)
MCHC RBC AUTO-ENTMCNC: 32.3 G/DL (ref 31–37)
MCV RBC AUTO: 90 FL (ref 80–100)
MONOCYTES NFR BLD: 0.4 K/UL (ref 0.1–1.2)
MONOCYTES NFR BLD: 8 % (ref 2–11)
NEUTROPHILS NFR BLD: 56 % (ref 36–66)
NEUTS SEG NFR BLD: 2.8 K/UL (ref 1.8–7.7)
PLATELET # BLD AUTO: 151 K/UL (ref 140–450)
PMV BLD AUTO: 9.9 FL (ref 6–12)
POTASSIUM SERPL-SCNC: 4.1 MMOL/L (ref 3.7–5.3)
PROTHROMBIN TIME: 41.1 SEC (ref 9.4–12.6)
RBC # BLD AUTO: 4.82 M/UL (ref 4–5.2)
SODIUM SERPL-SCNC: 139 MMOL/L (ref 135–144)
TIBC SERPL-MCNC: 425 UG/DL (ref 250–450)
TRIGL SERPL-MCNC: 90 MG/DL
TSH SERPL DL<=0.05 MIU/L-ACNC: 15.98 UIU/ML (ref 0.3–5)
UNSATURATED IRON BINDING CAPACITY: 396 UG/DL (ref 112–347)
VLDLC SERPL CALC-MCNC: 18 MG/DL
WBC OTHER # BLD: 5 K/UL (ref 3.5–11)

## 2024-09-28 PROCEDURE — 6370000000 HC RX 637 (ALT 250 FOR IP): Performed by: FAMILY MEDICINE

## 2024-09-28 PROCEDURE — 6360000002 HC RX W HCPCS: Performed by: NURSE PRACTITIONER

## 2024-09-28 PROCEDURE — 85025 COMPLETE CBC W/AUTO DIFF WBC: CPT

## 2024-09-28 PROCEDURE — 2060000000 HC ICU INTERMEDIATE R&B

## 2024-09-28 PROCEDURE — 2580000003 HC RX 258: Performed by: INTERNAL MEDICINE

## 2024-09-28 PROCEDURE — 84443 ASSAY THYROID STIM HORMONE: CPT

## 2024-09-28 PROCEDURE — 83550 IRON BINDING TEST: CPT

## 2024-09-28 PROCEDURE — 71045 X-RAY EXAM CHEST 1 VIEW: CPT

## 2024-09-28 PROCEDURE — 36415 COLL VENOUS BLD VENIPUNCTURE: CPT

## 2024-09-28 PROCEDURE — 2500000003 HC RX 250 WO HCPCS: Performed by: INTERNAL MEDICINE

## 2024-09-28 PROCEDURE — 80061 LIPID PANEL: CPT

## 2024-09-28 PROCEDURE — 83880 ASSAY OF NATRIURETIC PEPTIDE: CPT

## 2024-09-28 PROCEDURE — 85610 PROTHROMBIN TIME: CPT

## 2024-09-28 PROCEDURE — 80048 BASIC METABOLIC PNL TOTAL CA: CPT

## 2024-09-28 PROCEDURE — 6370000000 HC RX 637 (ALT 250 FOR IP): Performed by: NURSE PRACTITIONER

## 2024-09-28 PROCEDURE — 83540 ASSAY OF IRON: CPT

## 2024-09-28 PROCEDURE — 99223 1ST HOSP IP/OBS HIGH 75: CPT | Performed by: FAMILY MEDICINE

## 2024-09-28 PROCEDURE — 2580000003 HC RX 258: Performed by: NURSE PRACTITIONER

## 2024-09-28 PROCEDURE — 83735 ASSAY OF MAGNESIUM: CPT

## 2024-09-28 RX ORDER — LEVOTHYROXINE SODIUM 50 UG/1
50 TABLET ORAL DAILY
Status: DISCONTINUED | OUTPATIENT
Start: 2024-09-28 | End: 2024-10-04 | Stop reason: HOSPADM

## 2024-09-28 RX ORDER — METOPROLOL TARTRATE 1 MG/ML
2.5 INJECTION, SOLUTION INTRAVENOUS EVERY 4 HOURS PRN
Status: DISCONTINUED | OUTPATIENT
Start: 2024-09-28 | End: 2024-09-29

## 2024-09-28 RX ORDER — DILTIAZEM HYDROCHLORIDE 5 MG/ML
10 INJECTION INTRAVENOUS ONCE
Status: COMPLETED | OUTPATIENT
Start: 2024-09-28 | End: 2024-09-28

## 2024-09-28 RX ORDER — METOPROLOL SUCCINATE 25 MG/1
25 TABLET, EXTENDED RELEASE ORAL 2 TIMES DAILY
Status: DISCONTINUED | OUTPATIENT
Start: 2024-09-28 | End: 2024-09-29

## 2024-09-28 RX ORDER — LISINOPRIL 10 MG/1
10 TABLET ORAL DAILY
Status: DISCONTINUED | OUTPATIENT
Start: 2024-09-28 | End: 2024-09-29

## 2024-09-28 RX ADMIN — SODIUM CHLORIDE, PRESERVATIVE FREE 10 ML: 5 INJECTION INTRAVENOUS at 09:39

## 2024-09-28 RX ADMIN — METOPROLOL SUCCINATE 25 MG: 25 TABLET, EXTENDED RELEASE ORAL at 09:38

## 2024-09-28 RX ADMIN — LEVOTHYROXINE SODIUM 50 MCG: 50 TABLET ORAL at 12:38

## 2024-09-28 RX ADMIN — METOPROLOL SUCCINATE 25 MG: 25 TABLET, EXTENDED RELEASE ORAL at 20:33

## 2024-09-28 RX ADMIN — FUROSEMIDE 40 MG: 10 INJECTION, SOLUTION INTRAMUSCULAR; INTRAVENOUS at 17:32

## 2024-09-28 RX ADMIN — FAMOTIDINE 20 MG: 20 TABLET ORAL at 09:38

## 2024-09-28 RX ADMIN — EMPAGLIFLOZIN 10 MG: 10 TABLET, FILM COATED ORAL at 09:38

## 2024-09-28 RX ADMIN — SPIRONOLACTONE 25 MG: 25 TABLET, FILM COATED ORAL at 09:38

## 2024-09-28 RX ADMIN — FUROSEMIDE 40 MG: 10 INJECTION, SOLUTION INTRAMUSCULAR; INTRAVENOUS at 09:39

## 2024-09-28 RX ADMIN — HEPARIN SODIUM 5000 UNITS: 5000 INJECTION INTRAVENOUS; SUBCUTANEOUS at 09:39

## 2024-09-28 RX ADMIN — LISINOPRIL 10 MG: 10 TABLET ORAL at 12:38

## 2024-09-28 RX ADMIN — DILTIAZEM HYDROCHLORIDE 10 MG: 5 INJECTION, SOLUTION INTRAVENOUS at 18:42

## 2024-09-28 RX ADMIN — DILTIAZEM HYDROCHLORIDE 2.5 MG/HR: 5 INJECTION, SOLUTION INTRAVENOUS at 18:46

## 2024-09-28 NOTE — PLAN OF CARE
Problem: Discharge Planning  Goal: Discharge to home or other facility with appropriate resources  Outcome: Progressing     Problem: Safety - Adult  Goal: Free from fall injury  Outcome: Progressing     Problem: ABCDS Injury Assessment  Goal: Absence of physical injury  Outcome: Progressing     Problem: Respiratory - Adult  Goal: Achieves optimal ventilation and oxygenation  Outcome: Progressing     Problem: Cardiovascular - Adult  Goal: Maintains optimal cardiac output and hemodynamic stability  Outcome: Progressing     Problem: Skin/Tissue Integrity - Adult  Goal: Skin integrity remains intact  Outcome: Progressing     Problem: Metabolic/Fluid and Electrolytes - Adult  Goal: Electrolytes maintained within normal limits  Outcome: Progressing  Goal: Hemodynamic stability and optimal renal function maintained  Outcome: Progressing     Problem: Musculoskeletal - Adult  Goal: Return mobility to safest level of function  Outcome: Progressing

## 2024-09-28 NOTE — PROGRESS NOTES
[facility]  IN-PATIENT SERVICE   [facility] - Location: [place]    Progress Note    9/28/2024    11:08 AM    Name:   Tracee Riggins  MRN:     [id number]     Acct:      [id number]   Room:   323/323-01   Day:  1  Admit Date:  9/27/2024  4:58 PM    PCP:   Beverly Ventura DO  Code Status:  Full Code    Subjective:     Patient admitted for increasing dyspnea and edema.  She was seen in the emergency room proBNP was elevated and chest x-ray showed vascular congestion and small bilateral pleural effusions.  Patient remains in atrial fibs with ventricular rate in the low 100s.  During recent hospitalization she was tried on amiodarone but could not tolerate this medication.  Her rate is being controlled with Toprol-XL 25 mg daily and she is also on Coumadin.  INR recently was 2.2 patient has reduced ejection fraction of approximately 45%.  Recently she was seen in the office and was started on small doses of Lasix 20 mg every other day.  Patient also reports orthopnea over the last few days and dyspnea with exertion especially stairs.  Creatinine is stable at 1.5 and electrolytes are normal  Medications:     Allergies:    Allergies   Allergen Reactions    Amiodarone Nausea And Vomiting       Current Meds:   Scheduled Meds:    famotidine  20 mg Oral Daily    metoprolol succinate  25 mg Oral Daily    sodium chloride flush  5-40 mL IntraVENous 2 times per day    heparin (porcine)  5,000 Units SubCUTAneous BID    furosemide  40 mg IntraVENous BID    empagliflozin  10 mg Oral Daily    spironolactone  25 mg Oral Daily    warfarin placeholder: dosing by pharmacy   Other RX Placeholder     Continuous Infusions:    sodium chloride       PRN Meds: sodium chloride flush, sodium chloride, ondansetron **OR** ondansetron, polyethylene glycol, acetaminophen **OR** acetaminophen, perflutren lipid microspheres    Data:     Vitals: Signs are stable.  /83   Pulse (!) 117   Temp 97.3 °F (36.3 °C)

## 2024-09-28 NOTE — PROGRESS NOTES
Pharmacy Note  Warfarin Consult follow-up      Recent Labs     09/28/24  0826   INR 4.3     Recent Labs     09/27/24  1706 09/28/24  0826   HGB 13.6 14.0   HCT 42.3 43.4    151       Significant Drug-Drug Interactions:  New warfarin drug-drug interactions: n/a  Discontinued drug-drug interactions: heparin      Notes:    -Supratherapeutic INR  -Hold Coumadin today  -Subq heparin d/c'd  -INR reevaluation in AM                     Daily PT/INR while inpatient.      Thank you for the consult. Will continue to follow.  Elgin Moore, PharmD

## 2024-09-28 NOTE — PLAN OF CARE
Problem: Discharge Planning  Goal: Discharge to home or other facility with appropriate resources  9/28/2024 1303 by Harley Barbosa RN  Outcome: Progressing     Problem: Safety - Adult  Goal: Free from fall injury  9/28/2024 1303 by Harley Barbosa RN  Outcome: Progressing   Fall assessment performed. Bed in low, locked position with call light and tray table within reach. Education given on call light use.      Problem: ABCDS Injury Assessment  Goal: Absence of physical injury  9/28/2024 1303 by Harley Barbosa RN  Outcome: Progressing     Problem: Respiratory - Adult  Goal: Achieves optimal ventilation and oxygenation  9/28/2024 1303 by Harley Barbosa RN  Outcome: Progressing     Problem: Skin/Tissue Integrity - Adult  Goal: Skin integrity remains intact  9/28/2024 1303 by Harley Barbosa RN  Outcome: Progressing  Skin assessment performed. Pt turned every 2 hours and PRN with heels elevated off of bed.

## 2024-09-29 LAB
ALBUMIN SERPL-MCNC: 3.2 G/DL (ref 3.5–5.2)
ALBUMIN/GLOB SERPL: 1.3 {RATIO} (ref 1–2.5)
ALP SERPL-CCNC: 106 U/L (ref 35–104)
ALT SERPL-CCNC: 20 U/L (ref 5–33)
ANION GAP SERPL CALCULATED.3IONS-SCNC: 11 MMOL/L (ref 9–17)
AST SERPL-CCNC: 19 U/L
BILIRUB SERPL-MCNC: 0.9 MG/DL (ref 0.3–1.2)
BUN SERPL-MCNC: 57 MG/DL (ref 8–23)
CALCIUM SERPL-MCNC: 8.7 MG/DL (ref 8.6–10.4)
CHLORIDE SERPL-SCNC: 99 MMOL/L (ref 98–107)
CO2 SERPL-SCNC: 31 MMOL/L (ref 20–31)
CREAT SERPL-MCNC: 1.8 MG/DL (ref 0.5–0.9)
EKG Q-T INTERVAL: 366 MS
EKG QRS DURATION: 80 MS
EKG QTC CALCULATION (BAZETT): 474 MS
EKG R AXIS: 54 DEGREES
EKG T AXIS: 118 DEGREES
EKG VENTRICULAR RATE: 101 BPM
GFR, ESTIMATED: 27 ML/MIN/1.73M2
GLUCOSE SERPL-MCNC: 94 MG/DL (ref 70–99)
INR PPP: 3.4
MAGNESIUM SERPL-MCNC: 2.1 MG/DL (ref 1.6–2.6)
POTASSIUM SERPL-SCNC: 3.1 MMOL/L (ref 3.7–5.3)
PROT SERPL-MCNC: 5.7 G/DL (ref 6.4–8.3)
PROTHROMBIN TIME: 32.8 SEC (ref 9.4–12.6)
SODIUM SERPL-SCNC: 141 MMOL/L (ref 135–144)
T4 FREE SERPL-MCNC: 1.1 NG/DL (ref 0.92–1.68)

## 2024-09-29 PROCEDURE — 93010 ELECTROCARDIOGRAM REPORT: CPT | Performed by: INTERNAL MEDICINE

## 2024-09-29 PROCEDURE — 6370000000 HC RX 637 (ALT 250 FOR IP): Performed by: NURSE PRACTITIONER

## 2024-09-29 PROCEDURE — 84439 ASSAY OF FREE THYROXINE: CPT

## 2024-09-29 PROCEDURE — 6370000000 HC RX 637 (ALT 250 FOR IP): Performed by: FAMILY MEDICINE

## 2024-09-29 PROCEDURE — 85610 PROTHROMBIN TIME: CPT

## 2024-09-29 PROCEDURE — 6370000000 HC RX 637 (ALT 250 FOR IP): Performed by: INTERNAL MEDICINE

## 2024-09-29 PROCEDURE — 97161 PT EVAL LOW COMPLEX 20 MIN: CPT

## 2024-09-29 PROCEDURE — 2500000003 HC RX 250 WO HCPCS: Performed by: INTERNAL MEDICINE

## 2024-09-29 PROCEDURE — 6360000002 HC RX W HCPCS: Performed by: NURSE PRACTITIONER

## 2024-09-29 PROCEDURE — 36415 COLL VENOUS BLD VENIPUNCTURE: CPT

## 2024-09-29 PROCEDURE — 2060000000 HC ICU INTERMEDIATE R&B

## 2024-09-29 PROCEDURE — 97116 GAIT TRAINING THERAPY: CPT

## 2024-09-29 PROCEDURE — 83735 ASSAY OF MAGNESIUM: CPT

## 2024-09-29 PROCEDURE — 2580000003 HC RX 258: Performed by: NURSE PRACTITIONER

## 2024-09-29 PROCEDURE — 80053 COMPREHEN METABOLIC PANEL: CPT

## 2024-09-29 PROCEDURE — 6360000002 HC RX W HCPCS: Performed by: INTERNAL MEDICINE

## 2024-09-29 PROCEDURE — 99222 1ST HOSP IP/OBS MODERATE 55: CPT | Performed by: FAMILY MEDICINE

## 2024-09-29 PROCEDURE — 97165 OT EVAL LOW COMPLEX 30 MIN: CPT

## 2024-09-29 RX ORDER — METOPROLOL TARTRATE 50 MG
50 TABLET ORAL 2 TIMES DAILY
Status: DISCONTINUED | OUTPATIENT
Start: 2024-09-29 | End: 2024-09-29

## 2024-09-29 RX ORDER — METOPROLOL TARTRATE 1 MG/ML
5 INJECTION, SOLUTION INTRAVENOUS EVERY 4 HOURS PRN
Status: DISCONTINUED | OUTPATIENT
Start: 2024-09-29 | End: 2024-10-04 | Stop reason: HOSPADM

## 2024-09-29 RX ORDER — POTASSIUM CHLORIDE 1500 MG/1
40 TABLET, EXTENDED RELEASE ORAL PRN
Status: DISCONTINUED | OUTPATIENT
Start: 2024-09-29 | End: 2024-09-29

## 2024-09-29 RX ORDER — WARFARIN SODIUM 1 MG/1
0.5 TABLET ORAL
Status: COMPLETED | OUTPATIENT
Start: 2024-09-29 | End: 2024-09-29

## 2024-09-29 RX ORDER — POTASSIUM CHLORIDE 1500 MG/1
40 TABLET, EXTENDED RELEASE ORAL ONCE
Status: COMPLETED | OUTPATIENT
Start: 2024-09-29 | End: 2024-09-29

## 2024-09-29 RX ORDER — FUROSEMIDE 10 MG/ML
20 INJECTION INTRAMUSCULAR; INTRAVENOUS 2 TIMES DAILY
Status: DISCONTINUED | OUTPATIENT
Start: 2024-09-29 | End: 2024-09-30

## 2024-09-29 RX ORDER — METOPROLOL TARTRATE 50 MG
50 TABLET ORAL 2 TIMES DAILY
Status: DISCONTINUED | OUTPATIENT
Start: 2024-09-29 | End: 2024-09-30

## 2024-09-29 RX ORDER — POTASSIUM CHLORIDE 7.45 MG/ML
10 INJECTION INTRAVENOUS PRN
Status: DISCONTINUED | OUTPATIENT
Start: 2024-09-29 | End: 2024-09-29

## 2024-09-29 RX ADMIN — POTASSIUM CHLORIDE 40 MEQ: 1500 TABLET, EXTENDED RELEASE ORAL at 09:56

## 2024-09-29 RX ADMIN — FUROSEMIDE 20 MG: 10 INJECTION, SOLUTION INTRAMUSCULAR; INTRAVENOUS at 18:30

## 2024-09-29 RX ADMIN — SODIUM CHLORIDE, PRESERVATIVE FREE 10 ML: 5 INJECTION INTRAVENOUS at 21:00

## 2024-09-29 RX ADMIN — LISINOPRIL 10 MG: 10 TABLET ORAL at 08:38

## 2024-09-29 RX ADMIN — FUROSEMIDE 20 MG: 10 INJECTION, SOLUTION INTRAMUSCULAR; INTRAVENOUS at 08:39

## 2024-09-29 RX ADMIN — WARFARIN SODIUM 0.5 MG: 1 TABLET ORAL at 18:30

## 2024-09-29 RX ADMIN — METOPROLOL TARTRATE 50 MG: 50 TABLET, FILM COATED ORAL at 20:59

## 2024-09-29 RX ADMIN — SODIUM CHLORIDE, PRESERVATIVE FREE 10 ML: 5 INJECTION INTRAVENOUS at 08:39

## 2024-09-29 RX ADMIN — FAMOTIDINE 20 MG: 20 TABLET ORAL at 08:38

## 2024-09-29 RX ADMIN — METOPROLOL TARTRATE 5 MG: 5 INJECTION INTRAVENOUS at 12:40

## 2024-09-29 RX ADMIN — EMPAGLIFLOZIN 10 MG: 10 TABLET, FILM COATED ORAL at 08:39

## 2024-09-29 RX ADMIN — METOPROLOL SUCCINATE 25 MG: 25 TABLET, EXTENDED RELEASE ORAL at 08:38

## 2024-09-29 RX ADMIN — LEVOTHYROXINE SODIUM 50 MCG: 50 TABLET ORAL at 08:39

## 2024-09-29 NOTE — PROGRESS NOTES
Occupational Therapy  Facility/Department: [address]   Occupational Therapy Initial Evaluation    Patient Name: Tracee Riggins        MRN: [id number]    : 1940    Date of Service: 2024    Chief Complaint   Patient presents with    Shortness of Breath     Shortness of breath worsening today - NL SOB; no chest pain.  Leg swelling comes and goes.      Discharge Recommendations  Pt does not present with acute/post-acute OT needs at this time    OT Equipment Recommendations  Equipment Needed: No (Pt declines need)    Assessment  Performance deficits / Impairments: Decreased endurance  Assessment: Pt seen for therapy eval s/p admission with acute on chronic clinical systolic heart failure. PLOF includes living with son, IND all ADLs and IADLs including driving with no use of AE/DME. At this time, pt is MOD I bed mobiltiy, SUP ADL transfers and functional mobility with no device and appears to be safe to rerturn to PLOF with assist from son as needed. Pt does not present with acute OT needs at this time and is to be discharged from OT services.    Prognosis: Good  Decision Making: Medium Complexity  REQUIRES OT FOLLOW-UP: No    Activity Tolerance  Activity Tolerance: Patient limited by fatigue  Activity Tolerance Comments: Reports mild fatigue following mobility    Safety Devices  Type of Devices: Call light within reach;Gait belt;Left in chair;Nurse notified  Restraints  Restraints Initially in Place: No    Restrictions/Precautions  Restrictions/Precautions  Restrictions/Precautions: General Precautions  Required Braces or Orthoses?: No  Position Activity Restriction  Other position/activity restrictions: \"Up with assistance\"    Subjective  General  Patient assessed for rehabilitation services?: Yes  Family / Caregiver Present: Yes (Son)  Subjective  Subjective: RN ok'd pt for therapy eval this PM. Pt reporting no pain at time of eval and agreeable/cooperative throughout.    Home Setup/Prior Level of  Function  Social/Functional History  Lives With: Son  Type of Home: House  Has the patient had two or more falls in the past year or any fall with injury in the past year?: No  Receives Help From: Family  ADL Assistance: Independent  Homemaking Assistance: Independent  Ambulation Assistance: Independent  Transfer Assistance: Independent  Active : Yes  Occupation: Retired  Leisure & Hobbies: yardwork, volunteering  IADL Comments: IND IADLs  Additional Comments: Son home and able to assist PRN; pt very IND at baseline    Vision/Hearing  Vision  Vision: Impaired  Vision Exceptions: Wears glasses for reading  Hearing  Hearing: Within functional limits    BUE Assessment  Gross Assessment  AROM: Within functional limits (Not formally tested, BUE AROM appears WFL throughout eval)  Strength: Within functional limits (Not formally tested, BUE strength appears WFL throughout eval)  Coordination: Within functional limits  Sensation: Intact (Pt reports normal sensation grossly)     Objective  Orientation  Overall Orientation Status: Within Functional Limits  Orientation Level: Oriented X4  Cognition  Overall Cognitive Status: WFL    Activities of Daily Living  Feeding: Independent;Based on clinical judgement  Grooming: Supervision;Based on clinical judgement  UE Bathing: Modified independent ;Based on clinical judgement  LE Bathing: Supervision;Based on clinical judgement  UE Dressing: Modified independent ;Based on clinical judgement  LE Dressing: Setup  LE Dressing Skilled Clinical Factors: Seated in recliner to doff/re-mansi slipper socks  Toileting: Based on clinical judgement;Modified independent   Toileting Skilled Clinical Factors: Pt has been using restroom on own since admission    Balance  Balance  Sitting: Intact  Standing: Impaired (Static - SUP; dynamic - SUP)    Transfers/Mobility  Bed mobility  Supine to Sit: Modified independent  Scooting: Independent  Bed Mobility Comments: HOB slightly elevated; retired

## 2024-09-29 NOTE — PROGRESS NOTES
PHARMACY NOTE:    The electrolyte replacement protocol for potassium/magnesium has been discontinued per P&T guidelines because the patient has reduced renal function (CrCl < 30 mL/min).      The patient's most recent potassium & magnesium levels are:  Recent Labs     09/27/24  1706 09/28/24  0826 09/29/24  0642   K 4.7 4.1 3.1*   MG  --  2.1 2.1     Estimated Creatinine Clearance: 14 mL/min (A) (based on SCr of 1.8 mg/dL (H)).    For patients with decreased renal function (below 30ml/min) needing potassium/magnesium supplementation, please order individual bolus doses with appropriate monitoring.      Please contact the inpatient pharmacy with any concerns.  Thank you.   Elgin Moore, PharmD

## 2024-09-29 NOTE — PROGRESS NOTES
Physical Therapy  Facility/Department: [address]  Physical Therapy Initial Assessment    Name: Tracee Riggins  : 1940  MRN: [id number]  Date of Service: 2024  Chief Complaint   Patient presents with    Shortness of Breath     Shortness of breath worsening today - NL SOB; no chest pain.  Leg swelling comes and goes.         Discharge Recommendations:  Home with assist PRN   PT Equipment Recommendations  Equipment Needed: No      Patient Diagnosis(es): The primary encounter diagnosis was Acute congestive heart failure, unspecified heart failure type (HCC). Diagnoses of Elevated troponin and Stage 3b chronic kidney disease (HCC) were also pertinent to this visit.  Past Medical History:  has a past medical history of Chronic kidney disease, Gout, Gout, Hypertension, Lumbar disc disease, Thrombocytopenia (HCC), and Vitamin D deficiency.  Past Surgical History:  has a past surgical history that includes CABG with Mitral Valve Replacement and Cataract removal.    Assessment  Body Structures, Functions, Activity Limitations Requiring Skilled Therapeutic Intervention: Decreased functional mobility ;Decreased endurance  Assessment: Pt presents with decreased activity tolerance due to admission for acute on chronic CHF. Pt currently is able to transfer and ambulate without device with Modified (I) -Supervision without LOB but c/o mild SOB. Pt is to have a procedure tomorrow and will be monitored for therapy need after the procedure. Pt will be able to return home upon discharge.  Treatment Diagnosis: decreased activity tolerance  Therapy Prognosis: Excellent  Decision Making: Low Complexity  Requires PT Follow-Up: Yes  Activity Tolerance  Activity Tolerance: Patient tolerated evaluation without incident    Plan  Physical Therapy Plan  General Plan: 3-5 times per week  Current Treatment Recommendations: Functional mobility training, Endurance training, Gait training, Stair training, Safety education & training,

## 2024-09-29 NOTE — PROGRESS NOTES
[facility]  IN-PATIENT SERVICE   [facility] - Location: [place]    Progress Note    2024    10:44 AM    Name:   Tracee Riggins  MRN:     [id number]     Acct:      [id number]   Room:   323323-01   Day:  2  Admit Date:  2024  4:58 PM    PCP:   Beverly Ventura DO  Code Status:  Full Code    Subjective:   Patient feels much better today good diuresis and loss of over 2 L of fluid last 24 hours.  Patient has no shortness of breath feels much better heart rate under better control see consult from cardiology.  Cardioversion planned for tomorrow.  Potassium 3.1 and this is being replaced.  Creatinine 1.8 and Lasix has been decreased to 20 mg twice a day.  Free T4 is pending    Medications:     Allergies:    Allergies   Allergen Reactions    Amiodarone Nausea And Vomiting       Current Meds:   Scheduled Meds:    furosemide  20 mg IntraVENous BID    metoprolol tartrate  50 mg Oral BID    levothyroxine  50 mcg Oral Daily    famotidine  20 mg Oral Daily    sodium chloride flush  5-40 mL IntraVENous 2 times per day    empagliflozin  10 mg Oral Daily    [Held by provider] spironolactone  25 mg Oral Daily    warfarin placeholder: dosing by pharmacy   Other RX Placeholder     Continuous Infusions:    sodium chloride       PRN Meds: metoprolol, sodium chloride flush, sodium chloride, ondansetron **OR** ondansetron, polyethylene glycol, acetaminophen **OR** acetaminophen, perflutren lipid microspheres    Data:     Vitals: Signs are stable.  /61   Pulse 76   Temp 97.5 °F (36.4 °C) (Oral)   Resp 16   Ht 1.346 m (4' 5\")   Wt 43.6 kg (96 lb 1.9 oz)   SpO2 99%   BMI 24.06 kg/m²   Temp (24hrs), Av.8 °F (36.6 °C), Min:97.3 °F (36.3 °C), Max:99 °F (37.2 °C)    No results for input(s): \"POCGLU\" in the last 72 hours.    I/O (24Hr):    Intake/Output Summary (Last 24 hours) at 2024 1044  Last data filed at 2024 0427  Gross per 24 hour   Intake 263.42 ml   Output 2350 ml

## 2024-09-29 NOTE — CONSULTS
solid murmur in the apical area  Jugular venous pulsation Normal  Thre is no carotid bruit   Peripheral pulses are symmetrical and full   Abdomen:  No masses or tenderness  Bowel sounds present  Extremities:   No Cyanosis or Clubbing   Lower extremity edema: Trace pedal edema    Skin: Warm and dry  Neurological:  Not done     DATA:    Diagnostics:          EKG 9/27/24:  atrial fibrillation with rvr            Echocardiogram 8/30/2024    Left Ventricle: Mildly reduced left ventricular systolic function. EF by visual approximation is 45%. Left ventricle size is normal. Mildly increased wall thickness. Mild global hypokinesis present.    Aortic Valve: Not well visualized. Moderate regurgitation.    Mitral Valve: Bovine bioprosthetic valve that is well-seated with severely thickened leaflets and a size of 25 mm with an increased mean gradient of 11 mmHg across the valve that is abnormal for size and type. Significant regurgitaiton seen with spectral Doppler with a max velocity of 4.66 m/s and mean gradient of 59 mmHg and multiple jets seen with color Doppler. MV mean gradient is 11 mmHg.    Tricuspid Valve: Thickened leaflets. Severe regurgitation. Moderately elevated RVSP, consistent with moderate pulmonary hypertension. The estimated RVSP is 54 mmHg.    Left Atrium: Left atrium is severely dilated.    Right Atrium: Right atrium is dilated.    IVC/SVC: IVC diameter is greater than 21 mm and decreases less than 50% during inspiration; therefore the estimated right atrial pressure is elevated (~15 mmHg).    Image quality is adequate.        Labs:     CBC:   Recent Labs     09/27/24  1706 09/28/24  0826   WBC 5.4 5.0   HGB 13.6 14.0   HCT 42.3 43.4    151     BMP:   Recent Labs     09/28/24  0826 09/29/24  0642    141   K 4.1 3.1*   CO2 27 31   BUN 48* 57*   CREATININE 1.5* 1.8*   LABGLOM 34* 27*   GLUCOSE 105* 94     BNP: No results for input(s): \"BNP\" in the last 72 hours.  PT/INR:   Recent Labs      09/28/24  0826 09/29/24  0642   PROTIME 41.1* 32.8*   INR 4.3 3.4     APTT:No results for input(s): \"APTT\" in the last 72 hours.  CARDIAC ENZYMES:No results for input(s): \"CKTOTAL\", \"CKMB\", \"CKMBINDEX\", \"TROPONINI\" in the last 72 hours.  FASTING LIPID PANEL:  Lab Results   Component Value Date/Time    HDL 39 09/28/2024 08:26 AM    TRIG 90 09/28/2024 08:26 AM     LIVER PROFILE:  Recent Labs     09/29/24  0642   AST 19   ALT 20         IMPRESSION:    Persistent atrial fibrillation with rapid ventricular response  Acute on chronic heart failure with mildly reduced ejection fraction  History of bioprosthetic mitral valve replacement in 1986 and 2009  Moderate to severe mitral regurgitation of prosthetic valve  Chronic kidney disease  Moderate pulmonary hypertension   Severe tricuspid regurgitation  Hypokalemia         PLAN:  Decrease Lasix to IV 20 mg twice daily  Change Toprol to Lopressor 50 mg twice daily  Recommend DC cardioversion tomorrow to restore normal sinus rhythm, risks and benefits and alternatives of the procedure discussed with patient in detail, she verbalized understanding and willing to proceed  I had extensive discussion with patient regarding further management of her valvular heart disease.  Discussed proceeding with YELENA to further evaluate mitral valve.  Patient would like to stick with conservative management at this time given her advanced age and 2 prior heart surgeries.  Will continue to optimize medical therapy at this time  Therapeutic anticoagulation with Coumadin, INR has been therapeutic  Replace K   Hold aldactone         Discussed with patient and nursing.    Discussed with Dr. Evens Diaz MD [facility] [place]

## 2024-09-29 NOTE — PLAN OF CARE
Problem: Discharge Planning  Goal: Discharge to home or other facility with appropriate resources  9/28/2024 2232 by Amanuel Rhodes RN  Outcome: Progressing  9/28/2024 1303 by Harley Barbosa RN  Outcome: Progressing     Problem: Safety - Adult  Goal: Free from fall injury  9/28/2024 2232 by Amanuel Rhodes RN  Outcome: Progressing  9/28/2024 1303 by Harley Barbosa RN  Outcome: Progressing     Problem: ABCDS Injury Assessment  Goal: Absence of physical injury  9/28/2024 2232 by Amanuel Rhodes RN  Outcome: Progressing  9/28/2024 1303 by Harley Barbosa RN  Outcome: Progressing     Problem: Respiratory - Adult  Goal: Achieves optimal ventilation and oxygenation  9/28/2024 2232 by Amanuel Rhdoes RN  Outcome: Progressing  9/28/2024 1303 by Harley Barbosa RN  Outcome: Progressing     Problem: Cardiovascular - Adult  Goal: Maintains optimal cardiac output and hemodynamic stability  9/28/2024 2232 by mAanuel Rhodes RN  Outcome: Progressing  9/28/2024 1303 by Harley Barbosa RN  Outcome: Progressing     Problem: Skin/Tissue Integrity - Adult  Goal: Skin integrity remains intact  9/28/2024 2232 by Amanuel Rhodes RN  Outcome: Progressing  9/28/2024 1303 by Harley Barbosa RN  Outcome: Progressing     Problem: Metabolic/Fluid and Electrolytes - Adult  Goal: Electrolytes maintained within normal limits  9/28/2024 2232 by Amanuel Rhodes RN  Outcome: Progressing  9/28/2024 1303 by Harley Barbosa RN  Outcome: Progressing  Goal: Hemodynamic stability and optimal renal function maintained  9/28/2024 2232 by Amanuel Rhodes RN  Outcome: Progressing  9/28/2024 1303 by Harley Barbosa RN  Outcome: Progressing     Problem: Musculoskeletal - Adult  Goal: Return mobility to safest level of function  9/28/2024 2232 by Amanuel Rhodes RN  Outcome: Progressing  9/28/2024 1303 by Harley Barbosa RN  Outcome: Progressing     Problem: Chronic Conditions and Co-morbidities  Goal: Patient's chronic

## 2024-09-29 NOTE — PROGRESS NOTES
Pharmacy Note  Warfarin Consult follow-up      Recent Labs     09/29/24  0642   INR 3.4     Recent Labs     09/27/24  1706 09/28/24  0826   HGB 13.6 14.0   HCT 42.3 43.4    151       Significant Drug-Drug Interactions:  New warfarin drug-drug interactions: n/a  Discontinued drug-drug interactions: n/a    Warfarin dose prior to admission: 1 mg Tu/We; 2 mg AOD  Warfarin indication: A Fib  Target INR range: 2.0-3.0     Notes:        -Supratherapeutic INR down to 3.4 from 4.3 after held dose yesterday  -Will proceed with a reduced Coumadin dose of 0.5 mg today  -INR reevaluation in AM           Daily PT/INR while inpatient.      Thank you for the consult. Will continue to follow.  Elgin Moore, DarielaD

## 2024-09-30 ENCOUNTER — APPOINTMENT (OUTPATIENT)
Dept: GENERAL RADIOLOGY | Age: 84
DRG: 291 | End: 2024-09-30
Payer: MEDICARE

## 2024-09-30 ENCOUNTER — APPOINTMENT (OUTPATIENT)
Age: 84
DRG: 291 | End: 2024-09-30
Attending: INTERNAL MEDICINE
Payer: MEDICARE

## 2024-09-30 ENCOUNTER — HOSPITAL ENCOUNTER (INPATIENT)
Age: 84
Discharge: HOME | DRG: 291 | End: 2024-09-30
Attending: INTERNAL MEDICINE
Payer: MEDICARE

## 2024-09-30 DIAGNOSIS — I48.0 PAROXYSMAL ATRIAL FIBRILLATION (HCC): Primary | ICD-10-CM

## 2024-09-30 LAB
ALBUMIN SERPL-MCNC: 3.2 G/DL (ref 3.5–5.2)
ALBUMIN/GLOB SERPL: 1.2 {RATIO} (ref 1–2.5)
ALP SERPL-CCNC: 101 U/L (ref 35–104)
ALT SERPL-CCNC: 19 U/L (ref 5–33)
ANION GAP SERPL CALCULATED.3IONS-SCNC: 9 MMOL/L (ref 9–17)
AST SERPL-CCNC: 20 U/L
BILIRUB SERPL-MCNC: 0.7 MG/DL (ref 0.3–1.2)
BNP SERPL-MCNC: 4603 PG/ML
BUN SERPL-MCNC: 60 MG/DL (ref 8–23)
CALCIUM SERPL-MCNC: 8.7 MG/DL (ref 8.6–10.4)
CHLORIDE SERPL-SCNC: 101 MMOL/L (ref 98–107)
CO2 SERPL-SCNC: 32 MMOL/L (ref 20–31)
CREAT SERPL-MCNC: 1.8 MG/DL (ref 0.5–0.9)
ECHO BSA: 1.27 M2
ECHO BSA: 1.27 M2
EKG Q-T INTERVAL: 318 MS
EKG QRS DURATION: 74 MS
EKG QTC CALCULATION (BAZETT): 408 MS
EKG R AXIS: 32 DEGREES
EKG T AXIS: 101 DEGREES
EKG VENTRICULAR RATE: 99 BPM
GFR, ESTIMATED: 27 ML/MIN/1.73M2
GLUCOSE SERPL-MCNC: 100 MG/DL (ref 70–99)
INR PPP: 2.3
MAGNESIUM SERPL-MCNC: 2.1 MG/DL (ref 1.6–2.6)
POTASSIUM SERPL-SCNC: 3.9 MMOL/L (ref 3.7–5.3)
PROT SERPL-MCNC: 5.9 G/DL (ref 6.4–8.3)
PROTHROMBIN TIME: 22.6 SEC (ref 9.4–12.6)
SODIUM SERPL-SCNC: 142 MMOL/L (ref 135–144)

## 2024-09-30 PROCEDURE — 6370000000 HC RX 637 (ALT 250 FOR IP): Performed by: INTERNAL MEDICINE

## 2024-09-30 PROCEDURE — 6370000000 HC RX 637 (ALT 250 FOR IP): Performed by: NURSE PRACTITIONER

## 2024-09-30 PROCEDURE — 92960 CARDIOVERSION ELECTRIC EXT: CPT

## 2024-09-30 PROCEDURE — 93010 ELECTROCARDIOGRAM REPORT: CPT | Performed by: INTERNAL MEDICINE

## 2024-09-30 PROCEDURE — 6360000002 HC RX W HCPCS: Performed by: INTERNAL MEDICINE

## 2024-09-30 PROCEDURE — 71046 X-RAY EXAM CHEST 2 VIEWS: CPT

## 2024-09-30 PROCEDURE — 97116 GAIT TRAINING THERAPY: CPT

## 2024-09-30 PROCEDURE — 5A2204Z RESTORATION OF CARDIAC RHYTHM, SINGLE: ICD-10-PCS | Performed by: INTERNAL MEDICINE

## 2024-09-30 PROCEDURE — 80053 COMPREHEN METABOLIC PANEL: CPT

## 2024-09-30 PROCEDURE — 83880 ASSAY OF NATRIURETIC PEPTIDE: CPT

## 2024-09-30 PROCEDURE — 36415 COLL VENOUS BLD VENIPUNCTURE: CPT

## 2024-09-30 PROCEDURE — 2060000000 HC ICU INTERMEDIATE R&B

## 2024-09-30 PROCEDURE — 2580000003 HC RX 258: Performed by: NURSE PRACTITIONER

## 2024-09-30 PROCEDURE — 85610 PROTHROMBIN TIME: CPT

## 2024-09-30 PROCEDURE — 6370000000 HC RX 637 (ALT 250 FOR IP): Performed by: FAMILY MEDICINE

## 2024-09-30 PROCEDURE — 83735 ASSAY OF MAGNESIUM: CPT

## 2024-09-30 PROCEDURE — 99232 SBSQ HOSP IP/OBS MODERATE 35: CPT | Performed by: FAMILY MEDICINE

## 2024-09-30 PROCEDURE — 93005 ELECTROCARDIOGRAM TRACING: CPT | Performed by: FAMILY MEDICINE

## 2024-09-30 RX ORDER — WARFARIN SODIUM 1 MG/1
1 TABLET ORAL
Status: COMPLETED | OUTPATIENT
Start: 2024-09-30 | End: 2024-09-30

## 2024-09-30 RX ORDER — SODIUM CHLORIDE 0.9 % (FLUSH) 0.9 %
5-40 SYRINGE (ML) INJECTION PRN
OUTPATIENT
Start: 2024-09-30

## 2024-09-30 RX ORDER — SODIUM CHLORIDE 9 MG/ML
INJECTION, SOLUTION INTRAVENOUS PRN
OUTPATIENT
Start: 2024-09-30

## 2024-09-30 RX ORDER — FUROSEMIDE 10 MG/ML
20 INJECTION INTRAMUSCULAR; INTRAVENOUS DAILY
Status: DISCONTINUED | OUTPATIENT
Start: 2024-10-01 | End: 2024-10-04 | Stop reason: HOSPADM

## 2024-09-30 RX ORDER — SODIUM CHLORIDE 0.9 % (FLUSH) 0.9 %
5-40 SYRINGE (ML) INJECTION EVERY 12 HOURS SCHEDULED
OUTPATIENT
Start: 2024-09-30

## 2024-09-30 RX ORDER — METOPROLOL TARTRATE 25 MG/1
37.5 TABLET, FILM COATED ORAL 2 TIMES DAILY
Status: DISCONTINUED | OUTPATIENT
Start: 2024-09-30 | End: 2024-10-01

## 2024-09-30 RX ADMIN — FAMOTIDINE 20 MG: 20 TABLET ORAL at 08:18

## 2024-09-30 RX ADMIN — WARFARIN SODIUM 1 MG: 1 TABLET ORAL at 18:16

## 2024-09-30 RX ADMIN — METOPROLOL TARTRATE 50 MG: 50 TABLET, FILM COATED ORAL at 08:18

## 2024-09-30 RX ADMIN — LEVOTHYROXINE SODIUM 50 MCG: 50 TABLET ORAL at 06:13

## 2024-09-30 RX ADMIN — PROPOFOL 30 MG: 10 INJECTION, EMULSION INTRAVENOUS at 12:06

## 2024-09-30 RX ADMIN — FUROSEMIDE 20 MG: 10 INJECTION, SOLUTION INTRAMUSCULAR; INTRAVENOUS at 08:18

## 2024-09-30 RX ADMIN — PROPOFOL 20 MG: 10 INJECTION, EMULSION INTRAVENOUS at 12:08

## 2024-09-30 RX ADMIN — SODIUM CHLORIDE, PRESERVATIVE FREE 10 ML: 5 INJECTION INTRAVENOUS at 22:26

## 2024-09-30 RX ADMIN — EMPAGLIFLOZIN 10 MG: 10 TABLET, FILM COATED ORAL at 08:18

## 2024-09-30 RX ADMIN — SODIUM CHLORIDE, PRESERVATIVE FREE 10 ML: 5 INJECTION INTRAVENOUS at 08:23

## 2024-09-30 ASSESSMENT — ENCOUNTER SYMPTOMS
VOMITING: 0
NAUSEA: 0
COLOR CHANGE: 0
SORE THROAT: 0
ABDOMINAL PAIN: 0
COUGH: 0
RHINORRHEA: 0
SHORTNESS OF BREATH: 1
DIARRHEA: 0

## 2024-09-30 NOTE — CARE COORDINATION
Case Management Assessment  Initial Evaluation    Date/Time of Evaluation: 9/30/2024 1:35 PM  Assessment Completed by: Marybeth Thompson    If patient is discharged prior to next notation, then this note serves as note for discharge by case management.    Patient Name: Tracee Riggins                   YOB: 1940  Diagnosis: Elevated troponin [R79.89]  Acute on chronic clinical systolic heart failure (HCC) [I50.23]  Stage 3b chronic kidney disease (HCC) [N18.32]  Acute congestive heart failure, unspecified heart failure type (HCC) [I50.9]                   Date / Time: 9/27/2024  4:58 PM    Patient Admission Status: Inpatient   Readmission Risk (Low < 19, Mod (19-27), High > 27): Readmission Risk Score: 16.6    Current PCP: Beverly Ventura, DO  PCP verified by CM? Yes    Chart Reviewed: Yes      History Provided by: Patient  Patient Orientation: Alert and Oriented    Patient Cognition: Alert    Hospitalization in the last 30 days (Readmission):  Yes    If yes, Readmission Assessment in CM Navigator will be completed.    Advance Directives:      Code Status: Full Code   Patient's Primary Decision Maker is: Legal Next of Kin (son, Melissa)    Primary Decision Maker: SHAAN RIGGINS - Child - [phone]    Primary Decision Maker: MELISSA RIGGINS - Child - [phone]    Primary Decision Maker: LIBBY RIGGINS - Child - [phone]    Discharge Planning:    Patient lives with: Children Type of Home: House  Primary Care Giver: Self  Patient Support Systems include: Children   Current Financial resources: Medicare  Current community resources: None  Current services prior to admission: None            Current DME:              Type of Home Care services:  None    ADLS  Prior functional level: Independent in ADLs/IADLs  Current functional level: Independent in ADLs/IADLs    PT AM-PAC: 22 /24  OT AM-PAC: 24 /24    Family can provide assistance at DC: Yes  Would you like Case Management to discuss the discharge plan with

## 2024-09-30 NOTE — PROGRESS NOTES
([facility]) 2024 Yes    CKD (chronic kidney disease), stage III ([facility]) 2024 Yes    Essential hypertension 2024 Yes    S/P mitral valve replacement with bioprosthetic valve 2024 Yes       Plan:       Medical Decision Making: Jaylen Horner MD  2024  9:27 AM     Portion of this note were dictated using Dragon speech recognition software. It has been reviewed for accuracy, but may contain grammatical and clerical errors.  [facility]  IN-PATIENT SERVICE   [facility] - Location: [place]    Progress Note    2024    9:27 AM    Name:   Tracee Riggins  MRN:     [id number]     Acct:      [id number]   Room:   [address] [place] [address] Day:  3  Admit Date:  2024  4:58 PM    PCP:   Beverly Ventura DO  Code Status:  Full Code    Subjective:       Medications:     Allergies:    Allergies   Allergen Reactions    Amiodarone Nausea And Vomiting       Current Meds:   Scheduled Meds:    warfarin  1 mg Oral Once    furosemide  20 mg IntraVENous BID    metoprolol tartrate  50 mg Oral BID    levothyroxine  50 mcg Oral Daily    famotidine  20 mg Oral Daily    sodium chloride flush  5-40 mL IntraVENous 2 times per day    empagliflozin  10 mg Oral Daily    [Held by provider] spironolactone  25 mg Oral Daily    warfarin placeholder: dosing by pharmacy   Other RX Placeholder     Continuous Infusions:    sodium chloride       PRN Meds: metoprolol, sodium chloride flush, sodium chloride, ondansetron **OR** ondansetron, polyethylene glycol, acetaminophen **OR** acetaminophen, perflutren lipid microspheres    Data:     Vitals: Signs are stable.  /77   Pulse 80   Temp 97.4 °F (36.3 °C) (Oral)   Resp 17   Ht 1.346 m (4' 5\")   Wt 43.6 kg (96 lb 1.9 oz)   SpO2 97%   BMI 24.06 kg/m²   Temp (24hrs), Av.7 °F (36.5 °C), Min:97.3 °F (36.3 °C), Max:98.1 °F (36.7 °C)    No results for input(s): \"POCGLU\" in the last 72 hours.    I/O (24Hr):    Intake/Output Summary (Last 24 hours) at  atelectasis. 2. Cardiomegaly.       Physical Examination:     General appearance:  alert, cooperative and no distress  Neck:    Lungs:    Heart:    Abdomen:    Extremities:   Skin:  no gross lesions, rashes, induration    Assessment:     Hospital Problems             Last Modified POA    * (Principal) Acute on chronic clinical systolic heart failure (HCC) 9/27/2024 Yes    Paroxysmal atrial fibrillation (HCC) 9/27/2024 Yes    CKD (chronic kidney disease), stage III (HCC) 9/27/2024 Yes    Essential hypertension 9/27/2024 Yes    S/P mitral valve replacement with bioprosthetic valve 9/27/2024 Yes       Plan:       Medical Decision Making: Medium    Medications and labs were reviewed will decrease Lasix to once a day.  Cardioversion today  Torrey Horner MD  9/30/2024  9:27 AM     Portion of this note were dictated using Dragon speech recognition software. It has been reviewed for accuracy, but may contain grammatical and clerical errors.

## 2024-09-30 NOTE — PROCEDURES
Cardioversion Note          Date of the procedure: 9/30/24     Indication: symptomatic atrial fib with rvr     : Ayde Diaz MD     Patient seen and examined. History and Physical reviewed. Labs reviewed.    The informed consent was obtained with explanation of the risks and benefits.     All sedation was administered by the cardiologist.       CARDIOVERSION:  After an adequate level of sedation was achieved, 100J in biphasic synchronized delivery was administered with conversion to normal sinus rhythm. The patient awoke without complications. A post procedure 12 L ECG was ordered and reviewed.      Plan:   Decrease lopressor dose to 12.5 mg bid  Gentle diuresis   Anticipate dc tomorrow if remains stable     Electronically signed by Ayde Diaz MD on 9/30/2024 at 12:13 PM    [facility]

## 2024-09-30 NOTE — CONSULTS
Nutrition Note    CHF Nutrition Education    Consult received for heart failure diet education.  Nutrition therapy included low sodium diet guidelines.  Patient tries to follow a low sodium diet. Pt is used to following a potassium restriction at home d/t hx of impaired renal function, stated she also limits sodium .  All questions answered and patient voiced understanding.  Time spent with patient: 20 minutes      WEI Mcnair, RDN, LD  Registered Dietitian  [facility]  [phone]

## 2024-09-30 NOTE — PLAN OF CARE
Problem: Discharge Planning  Goal: Discharge to home or other facility with appropriate resources  Outcome: Progressing     Problem: Safety - Adult  Goal: Free from fall injury  Outcome: Progressing     Problem: ABCDS Injury Assessment  Goal: Absence of physical injury  Outcome: Progressing     Problem: Respiratory - Adult  Goal: Achieves optimal ventilation and oxygenation  Outcome: Progressing     Problem: Cardiovascular - Adult  Goal: Maintains optimal cardiac output and hemodynamic stability  Outcome: Progressing

## 2024-09-30 NOTE — PROGRESS NOTES
Physical Therapy  Facility/Department: [address]  Treatment Note    Name: Tracee Riggins  : 1940  MRN: [id number]  Date of Service: 2024    Discharge Recommendations:  Home with assist PRN   PT Equipment Recommendations  Equipment Needed: No      Patient Diagnosis(es): The primary encounter diagnosis was Acute congestive heart failure, unspecified heart failure type (HCC). Diagnoses of Elevated troponin, Stage 3b chronic kidney disease (HCC), and Atrial fibrillation (HCC) were also pertinent to this visit.  Past Medical History:  has a past medical history of Chronic kidney disease, Gout, Gout, Hypertension, Lumbar disc disease, Thrombocytopenia (HCC), and Vitamin D deficiency.  Past Surgical History:  has a past surgical history that includes CABG with Mitral Valve Replacement and Cataract removal.    Assessment  Body Structures, Functions, Activity Limitations Requiring Skilled Therapeutic Intervention: Decreased functional mobility ;Decreased endurance  Assessment: Pt presents with decreased activity tolerance due to admission for acute on chronic CHF. Pt currently is able to transfer and ambulate without device with Modified (I) -Supervision. Pt is to have a procedure later today and will be monitored for therapy need after the procedure. Pt will be able to prior living arrangements after discharge from the hospital.  Therapy Prognosis: Excellent  Decision Making: Low Complexity  Requires PT Follow-Up: Yes  Activity Tolerance  Activity Tolerance: Patient tolerated treatment well    Plan  Physical Therapy Plan  General Plan: 3-5 times per week  Current Treatment Recommendations: Functional mobility training, Endurance training, Gait training, Stair training, Safety education & training, Patient/Caregiver education & training, Therapeutic activities  Safety Devices  Type of Devices: Call light within reach, Gait belt, Left in chair, Nurse notified  Restraints  Restraints Initially in Place:

## 2024-09-30 NOTE — PROGRESS NOTES
Pharmacy Note  Warfarin Consult follow-up      Recent Labs     09/30/24  0534   INR 2.3     Recent Labs     09/27/24  1706 09/28/24  0826   HGB 13.6 14.0   HCT 42.3 43.4    151       Significant Drug-Drug Interactions:  none        Notes:                   INR therapeutic at 2.3  Will plan to give 1 mg of Coumadin today  Daily PT/INR while inpatient.     Brielle Bonilla, PharmD 9/30/2024 8:57 AM

## 2024-10-01 ENCOUNTER — APPOINTMENT (OUTPATIENT)
Dept: ULTRASOUND IMAGING | Age: 84
DRG: 291 | End: 2024-10-01
Attending: FAMILY MEDICINE
Payer: MEDICARE

## 2024-10-01 LAB
ANION GAP SERPL CALCULATED.3IONS-SCNC: 12 MMOL/L (ref 9–17)
ANION GAP SERPL CALCULATED.3IONS-SCNC: 14 MMOL/L (ref 9–17)
BUN SERPL-MCNC: 72 MG/DL (ref 8–23)
BUN SERPL-MCNC: 72 MG/DL (ref 8–23)
CALCIUM SERPL-MCNC: 9 MG/DL (ref 8.6–10.4)
CALCIUM SERPL-MCNC: 9.2 MG/DL (ref 8.6–10.4)
CHLORIDE SERPL-SCNC: 100 MMOL/L (ref 98–107)
CHLORIDE SERPL-SCNC: 101 MMOL/L (ref 98–107)
CO2 SERPL-SCNC: 26 MMOL/L (ref 20–31)
CO2 SERPL-SCNC: 29 MMOL/L (ref 20–31)
CREAT SERPL-MCNC: 2.2 MG/DL (ref 0.5–0.9)
CREAT SERPL-MCNC: 2.4 MG/DL (ref 0.5–0.9)
GFR, ESTIMATED: 19 ML/MIN/1.73M2
GFR, ESTIMATED: 21 ML/MIN/1.73M2
GLUCOSE SERPL-MCNC: 140 MG/DL (ref 70–99)
GLUCOSE SERPL-MCNC: 142 MG/DL (ref 70–99)
INR PPP: 2.1
MAGNESIUM SERPL-MCNC: 2.3 MG/DL (ref 1.6–2.6)
POTASSIUM SERPL-SCNC: 4 MMOL/L (ref 3.7–5.3)
POTASSIUM SERPL-SCNC: 4.3 MMOL/L (ref 3.7–5.3)
PROTHROMBIN TIME: 21 SEC (ref 9.4–12.6)
SODIUM SERPL-SCNC: 141 MMOL/L (ref 135–144)
SODIUM SERPL-SCNC: 141 MMOL/L (ref 135–144)

## 2024-10-01 PROCEDURE — 2060000000 HC ICU INTERMEDIATE R&B

## 2024-10-01 PROCEDURE — 2580000003 HC RX 258: Performed by: FAMILY MEDICINE

## 2024-10-01 PROCEDURE — 85610 PROTHROMBIN TIME: CPT

## 2024-10-01 PROCEDURE — 99233 SBSQ HOSP IP/OBS HIGH 50: CPT | Performed by: FAMILY MEDICINE

## 2024-10-01 PROCEDURE — 80048 BASIC METABOLIC PNL TOTAL CA: CPT

## 2024-10-01 PROCEDURE — 99232 SBSQ HOSP IP/OBS MODERATE 35: CPT

## 2024-10-01 PROCEDURE — 36415 COLL VENOUS BLD VENIPUNCTURE: CPT

## 2024-10-01 PROCEDURE — 6370000000 HC RX 637 (ALT 250 FOR IP): Performed by: FAMILY MEDICINE

## 2024-10-01 PROCEDURE — 83735 ASSAY OF MAGNESIUM: CPT

## 2024-10-01 PROCEDURE — 76770 US EXAM ABDO BACK WALL COMP: CPT

## 2024-10-01 PROCEDURE — 6370000000 HC RX 637 (ALT 250 FOR IP): Performed by: NURSE PRACTITIONER

## 2024-10-01 PROCEDURE — 51798 US URINE CAPACITY MEASURE: CPT

## 2024-10-01 RX ORDER — SODIUM CHLORIDE 9 MG/ML
INJECTION, SOLUTION INTRAVENOUS CONTINUOUS
Status: ACTIVE | OUTPATIENT
Start: 2024-10-01 | End: 2024-10-01

## 2024-10-01 RX ORDER — WARFARIN SODIUM 1 MG/1
2 TABLET ORAL
Status: COMPLETED | OUTPATIENT
Start: 2024-10-01 | End: 2024-10-01

## 2024-10-01 RX ORDER — METOPROLOL TARTRATE 25 MG/1
12.5 TABLET, FILM COATED ORAL 2 TIMES DAILY
Status: DISCONTINUED | OUTPATIENT
Start: 2024-10-01 | End: 2024-10-03

## 2024-10-01 RX ADMIN — FAMOTIDINE 20 MG: 20 TABLET ORAL at 10:24

## 2024-10-01 RX ADMIN — WARFARIN SODIUM 2 MG: 1 TABLET ORAL at 18:11

## 2024-10-01 RX ADMIN — EMPAGLIFLOZIN 10 MG: 10 TABLET, FILM COATED ORAL at 10:24

## 2024-10-01 RX ADMIN — SODIUM CHLORIDE: 9 INJECTION, SOLUTION INTRAVENOUS at 06:53

## 2024-10-01 RX ADMIN — LEVOTHYROXINE SODIUM 50 MCG: 50 TABLET ORAL at 06:52

## 2024-10-01 NOTE — PROGRESS NOTES
Pharmacy Note  Warfarin Consult follow-up      Recent Labs     10/01/24  0518   INR 2.1     No results for input(s): \"HGB\", \"HCT\", \"PLT\" in the last 72 hours.    Significant Drug-Drug Interactions:  New warfarin drug-drug interactions: n/a  Discontinued drug-drug interactions: n/a      Notes:       -Therapeutic INR  -Coumadin 2 mg today  -INR reevaluation in AM                 Daily PT/INR while inpatient.      Thank you for the consult. Will continue to follow.  Elgin Moore, DarielaD

## 2024-10-01 NOTE — PROGRESS NOTES
Spiritual Health History and Assessment/Progress Note  [facility]    (P) Spiritual/Emotional Needs,  , (P) Life Adjustments,      Name: Tracee Riggins MRN: [id number]    Age: 83 y.o.     Sex: female   Language: English   Mandaeism: Hinduism   Acute on chronic clinical systolic heart failure (HCC)     Date: 10/1/2024            Total Time Calculated: (P) 20 min              Spiritual Assessment began in [facility]        Referral/Consult From: (P) Rounding   Encounter Overview/Reason: (P) Spiritual/Emotional Needs  Service Provided For: (P) Patient and family together    Writer met w/ pt and pt's son in pt's room. Pt and pt's son shared about pt's current medical challenges. Pt and pt's son also shared about pt's yenifer tradition as Hinduism. Pt is a member of  [employer] in [place]. Pt said that she misses serving in the [employer] is various ways and hopes to be able to recover physically and \"serve\" again in the [employer]. Pt welcomed this writer to pray with her.     Yenifer, Belief, Meaning:   Patient is connected with a yenifer tradition or spiritual practice  Family/Friends are connected with a yenifer tradition or spiritual practice      Importance and Influence:  Patient has spiritual/personal beliefs that influence decisions regarding their health  Family/Friends have spiritual/personal beliefs that influence decisions regarding the patient's health    Community:  Patient is connected with a spiritual community and feels well-supported. Support system includes: Children, Yenifer Community, and Extended family  Family/Friends are connected with a spiritual community: and feel well-supported. Support system includes: Children and Extended family    Assessment and Plan of Care:     Patient Interventions include: Facilitated expression of thoughts and feelings, Explored spiritual coping/struggle/distress, Engaged in theological reflection, Affirmed coping skills/support systems, and Provided

## 2024-10-01 NOTE — PROGRESS NOTES
[facility]  IN-PATIENT SERVICE    - Location: [place]    Progress Note    10/1/2024    10:02 AM    Name:   Tracee Riggins  MRN:     [id number]     Acct:      [id number]   Room:   323/323-01   Day:  4  Admit Date:  2024  4:58 PM    PCP:   Beverly Ventura DO  Code Status:  Full Code    Subjective:     Patient had cardioversion yesterday which was successful and her pulse has been in the low to mid 50s since that time, her metoprolol dose was held last night and this morning due to low pulse.  She denies chest pain or shortness of breath.  The leg swelling is immensely improved from what she had upon admission where it was all the way up to her flank region.  Eating and drinking well, having bowel movements.  She did notice when she tried to urinate there was only a little bit a urine.  No dysuria or hematuria    Medications:     Allergies:    Allergies   Allergen Reactions    Amiodarone Nausea And Vomiting       Current Meds:   Scheduled Meds:    warfarin  2 mg Oral Once    [Held by provider] furosemide  20 mg IntraVENous Daily    metoprolol tartrate  37.5 mg Oral BID    levothyroxine  50 mcg Oral Daily    famotidine  20 mg Oral Daily    sodium chloride flush  5-40 mL IntraVENous 2 times per day    empagliflozin  10 mg Oral Daily    [Held by provider] spironolactone  25 mg Oral Daily    warfarin placeholder: dosing by pharmacy   Other RX Placeholder     Continuous Infusions:    sodium chloride 50 mL/hr at 10/01/24 0653    sodium chloride       PRN Meds: metoprolol, sodium chloride flush, sodium chloride, ondansetron **OR** ondansetron, polyethylene glycol, acetaminophen **OR** acetaminophen, perflutren lipid microspheres    Data:     Vitals:  /61   Pulse 52   Temp 97.3 °F (36.3 °C)   Resp 14   Ht 1.346 m (4' 5\")   Wt 40.7 kg (89 lb 11.6 oz)   SpO2 96%   BMI 22.46 kg/m²   Temp (24hrs), Av.6 °F (36.4 °C), Min:97.3 °F (36.3 °C), Max:97.8 °F (36.6 °C)    No  results for input(s): \"POCGLU\" in the last 72 hours.    I/O (24Hr):    Intake/Output Summary (Last 24 hours) at 10/1/2024 1002  Last data filed at 10/1/2024 0714  Gross per 24 hour   Intake --   Output 2200 ml   Net -2200 ml       Labs:  Hematology:  Recent Labs     09/29/24  0642 09/30/24  0534 10/01/24  0518   INR 3.4 2.3 2.1     Chemistry:  Recent Labs     09/29/24  0642 09/30/24  0534 10/01/24  0518    142 141   K 3.1* 3.9 4.0   CL 99 101 100   CO2 31 32* 29   GLUCOSE 94 100* 142*   BUN 57* 60* 72*   CREATININE 1.8* 1.8* 2.4*   MG 2.1 2.1 2.3   ANIONGAP 11 9 12   LABGLOM 27* 27* 19*   CALCIUM 8.7 8.7 9.0   PROBNP  --  4,603*  --      Recent Labs     09/29/24 0642 09/30/24  0534   AST 19 20   ALT 20 19   ALKPHOS 106* 101   BILITOT 0.9 0.7     ABG:No results found for: \"POCPH\", \"PHART\", \"PH\", \"POCPCO2\", \"XHO8WAI\", \"PCO2\", \"POCPO2\", \"PO2ART\", \"PO2\", \"POCHCO3\", \"UFV3XNY\", \"HCO3\", \"NBEA\", \"PBEA\", \"BEART\", \"BE\", \"THGBART\", \"THB\", \"KFL0OLE\", \"BBON7YFI\", \"T2EREVIS\", \"O2SAT\", \"FIO2\"  No results found for: \"SPECIAL\"  No results found for: \"CULTURE\"    Radiology:  US RETROPERITONEAL COMPLETE    Result Date: 10/1/2024  No sonographic evidence of hydronephrosis seen     XR CHEST (2 VW)    Result Date: 9/30/2024  Decreased bilateral pleural effusions     XR CHEST PORTABLE    Result Date: 9/28/2024  Stable small-moderate bilateral pleural effusions with probable adjacent atelectasis. Stable mild prominence of the central pulmonary vasculature.  No shauna pulmonary edema. Cardiomegaly.     XR CHEST PORTABLE    Result Date: 9/27/2024  1. Small bilateral pleural effusions with adjacent opacities, likely atelectasis. 2. Cardiomegaly.       Physical Examination:     General appearance:  alert, cooperative and no distress  Mental Status:  oriented to person, place and time and normal affect  Lungs:  clear to auscultation bilaterally, normal effort  Heart:  regular rate and rhythm, no murmur  Abdomen:  soft, nontender,

## 2024-10-01 NOTE — PLAN OF CARE
Problem: Discharge Planning  Goal: Discharge to home or other facility with appropriate resources  Outcome: Progressing     Problem: Safety - Adult  Goal: Free from fall injury  Outcome: Progressing     Problem: ABCDS Injury Assessment  Goal: Absence of physical injury  Outcome: Progressing     Problem: Respiratory - Adult  Goal: Achieves optimal ventilation and oxygenation  Outcome: Progressing     Problem: Cardiovascular - Adult  Goal: Maintains optimal cardiac output and hemodynamic stability  Outcome: Progressing     Problem: Skin/Tissue Integrity - Adult  Goal: Skin integrity remains intact  Outcome: Progressing     Problem: Metabolic/Fluid and Electrolytes - Adult  Goal: Electrolytes maintained within normal limits  Outcome: Progressing  Goal: Hemodynamic stability and optimal renal function maintained  Outcome: Progressing     Problem: Musculoskeletal - Adult  Goal: Return mobility to safest level of function  Outcome: Progressing     Problem: Chronic Conditions and Co-morbidities  Goal: Patient's chronic conditions and co-morbidity symptoms are monitored and maintained or improved  Outcome: Progressing

## 2024-10-01 NOTE — ED PROVIDER NOTES
Emergency Department     Faculty Attestation    I discussed management with the mid level provider. I reviewed the mid level provider's note and agree with the documented findings and plan of care. Any areas of disagreement are noted on the chart. I was personally present for the key portions of any procedures. I have documented in the chart those procedures where I was not present during the key portions. I have reviewed the emergency nurses triage note. I agree with the chief complaint, past medical history, past surgical history, allergies, medications, social and family history as documented unless otherwise noted below. Documentation of the HPI, Physical Exam and Medical Decision Making performed by medical students or scribes is based on my personal performance of the HPI, PE and MDM. For Physician Assistant/ Nurse Practitioner cases/documentation I have personally evaluated this patient and have completed at least one if not all key elements of the E/M (history, physical exam, and MDM). Additional findings are as noted.      Primary Care Physician:  Beverly Ventura DO    CHIEF COMPLAINT       Chief Complaint   Patient presents with    Shortness of Breath     Shortness of breath worsening today - NL SOB; no chest pain.  Leg swelling comes and goes.        RECENT VITALS:   Temp: 97.5 °F (36.4 °C),  Pulse: 55, Respirations: 14, BP: (!) 105/56    LABS:  Labs Reviewed   BASIC METABOLIC PANEL - Abnormal; Notable for the following components:       Result Value    Sodium 134 (*)     Glucose 104 (*)     BUN 52 (*)     Creatinine 1.6 (*)     Est, Glom Filt Rate 31 (*)     All other components within normal limits   TROPONIN - Abnormal; Notable for the following components:    Troponin, High Sensitivity 33 (*)     All other components within normal limits   BRAIN NATRIURETIC PEPTIDE - Abnormal; Notable for the following components:    NT Pro-BNP 7,599 (*)     All other components within normal limits   TROPONIN -  Abnormal; Notable for the following components:    Troponin, High Sensitivity 32 (*)     All other components within normal limits   BASIC METABOLIC PANEL - Abnormal; Notable for the following components:    Glucose 105 (*)     BUN 48 (*)     Creatinine 1.5 (*)     Est, Glom Filt Rate 34 (*)     All other components within normal limits   BRAIN NATRIURETIC PEPTIDE - Abnormal; Notable for the following components:    NT Pro-BNP 9,418 (*)     All other components within normal limits   LIPID PANEL - Abnormal; Notable for the following components:    HDL 39 (*)     All other components within normal limits   TSH - Abnormal; Notable for the following components:    TSH 15.98 (*)     All other components within normal limits   PROTIME-INR - Abnormal; Notable for the following components:    Protime 41.1 (*)     All other components within normal limits   IRON AND TIBC - Abnormal; Notable for the following components:    Iron 29 (*)     Iron % Saturation 7 (*)     UIBC 396 (*)     All other components within normal limits   PROTIME-INR - Abnormal; Notable for the following components:    Protime 32.8 (*)     All other components within normal limits   COMPREHENSIVE METABOLIC PANEL - Abnormal; Notable for the following components:    Potassium 3.1 (*)     BUN 57 (*)     Creatinine 1.8 (*)     Est, Glom Filt Rate 27 (*)     Total Protein 5.7 (*)     Albumin 3.2 (*)     Alkaline Phosphatase 106 (*)     All other components within normal limits   BRAIN NATRIURETIC PEPTIDE - Abnormal; Notable for the following components:    NT Pro-BNP 4,603 (*)     All other components within normal limits   PROTIME-INR - Abnormal; Notable for the following components:    Protime 22.6 (*)     All other components within normal limits   COMPREHENSIVE METABOLIC PANEL - Abnormal; Notable for the following components:    CO2 32 (*)     Glucose 100 (*)     BUN 60 (*)     Creatinine 1.8 (*)     Est, Glom Filt Rate 27 (*)     Total Protein 5.9 (*)

## 2024-10-01 NOTE — PROGRESS NOTES
Cardiology Progress Note                     Date:   10/1/2024  Patient name: Tracee Riggins  Date of admission:  9/27/2024  4:58 PM  MRN:   [id number]  YOB: 1940  PCP: Beverly Ventura DO    Reason for Admission:      Subjective:       There were no acute events overnight, remained hemodynamically stable, denies chest pain, dyspnea, orthopnea or palpitations.      Scheduled Meds:   warfarin  2 mg Oral Once    metoprolol tartrate  12.5 mg Oral BID    [Held by provider] furosemide  20 mg IntraVENous Daily    levothyroxine  50 mcg Oral Daily    famotidine  20 mg Oral Daily    sodium chloride flush  5-40 mL IntraVENous 2 times per day    empagliflozin  10 mg Oral Daily    [Held by provider] spironolactone  25 mg Oral Daily    warfarin placeholder: dosing by pharmacy   Other RX Placeholder       Continuous Infusions:   sodium chloride 50 mL/hr at 10/01/24 0653    sodium chloride         Labs:     CBC: No results for input(s): \"WBC\", \"HGB\", \"PLT\" in the last 72 hours.  BMP:    Recent Labs     09/29/24  0642 09/30/24  0534 10/01/24  0518    142 141   K 3.1* 3.9 4.0   CL 99 101 100   CO2 31 32* 29   BUN 57* 60* 72*   CREATININE 1.8* 1.8* 2.4*   GLUCOSE 94 100* 142*     Hepatic:   Recent Labs     09/29/24  0642 09/30/24  0534   AST 19 20   ALT 20 19   BILITOT 0.9 0.7   ALKPHOS 106* 101     Troponin: No results for input(s): \"TROPONINI\" in the last 72 hours.  BNP: No results for input(s): \"BNP\" in the last 72 hours.  Lipids: No results for input(s): \"CHOL\", \"HDL\" in the last 72 hours.    Invalid input(s): \"LDLCALCU\"  INR:   Recent Labs     09/29/24  0642 09/30/24  0534 10/01/24  0518   INR 3.4 2.3 2.1         Objective:     Vitals: BP (!) 105/56   Pulse 55   Temp 97.5 °F (36.4 °C) (Oral)   Resp 14   Ht 1.346 m (4' 5\")   Wt 40.7 kg (89 lb 11.6 oz)   SpO2 98%   BMI 22.46 kg/m²     General appearance: awake, alert, in no apparent respiratory distress   HEENT: Head: Normocephalic, no

## 2024-10-02 ENCOUNTER — APPOINTMENT (OUTPATIENT)
Dept: GENERAL RADIOLOGY | Age: 84
DRG: 291 | End: 2024-10-02
Payer: MEDICARE

## 2024-10-02 PROBLEM — N18.9 ACUTE KIDNEY INJURY SUPERIMPOSED ON CKD (HCC): Status: ACTIVE | Noted: 2024-10-02

## 2024-10-02 PROBLEM — I48.20 CHRONIC ATRIAL FIBRILLATION (HCC): Status: ACTIVE | Noted: 2024-10-02

## 2024-10-02 PROBLEM — N17.9 AKI (ACUTE KIDNEY INJURY) (HCC): Status: ACTIVE | Noted: 2024-10-02

## 2024-10-02 LAB
ANION GAP SERPL CALCULATED.3IONS-SCNC: 12 MMOL/L (ref 9–17)
BNP SERPL-MCNC: 3302 PG/ML
BUN SERPL-MCNC: 74 MG/DL (ref 8–23)
CALCIUM SERPL-MCNC: 8.8 MG/DL (ref 8.6–10.4)
CHLORIDE SERPL-SCNC: 103 MMOL/L (ref 98–107)
CO2 SERPL-SCNC: 26 MMOL/L (ref 20–31)
CREAT SERPL-MCNC: 2.1 MG/DL (ref 0.5–0.9)
GFR, ESTIMATED: 23 ML/MIN/1.73M2
GLUCOSE SERPL-MCNC: 117 MG/DL (ref 70–99)
INR PPP: 2.4
MAGNESIUM SERPL-MCNC: 2.4 MG/DL (ref 1.6–2.6)
POTASSIUM SERPL-SCNC: 3.9 MMOL/L (ref 3.7–5.3)
PROTHROMBIN TIME: 23.6 SEC (ref 9.4–12.6)
SODIUM SERPL-SCNC: 141 MMOL/L (ref 135–144)

## 2024-10-02 PROCEDURE — 2580000003 HC RX 258: Performed by: NURSE PRACTITIONER

## 2024-10-02 PROCEDURE — 99232 SBSQ HOSP IP/OBS MODERATE 35: CPT | Performed by: INTERNAL MEDICINE

## 2024-10-02 PROCEDURE — 83735 ASSAY OF MAGNESIUM: CPT

## 2024-10-02 PROCEDURE — 2060000000 HC ICU INTERMEDIATE R&B

## 2024-10-02 PROCEDURE — 6370000000 HC RX 637 (ALT 250 FOR IP): Performed by: NURSE PRACTITIONER

## 2024-10-02 PROCEDURE — 73020 X-RAY EXAM OF SHOULDER: CPT

## 2024-10-02 PROCEDURE — 97116 GAIT TRAINING THERAPY: CPT

## 2024-10-02 PROCEDURE — 36415 COLL VENOUS BLD VENIPUNCTURE: CPT

## 2024-10-02 PROCEDURE — 99232 SBSQ HOSP IP/OBS MODERATE 35: CPT | Performed by: FAMILY MEDICINE

## 2024-10-02 PROCEDURE — 85610 PROTHROMBIN TIME: CPT

## 2024-10-02 PROCEDURE — 80048 BASIC METABOLIC PNL TOTAL CA: CPT

## 2024-10-02 PROCEDURE — 83880 ASSAY OF NATRIURETIC PEPTIDE: CPT

## 2024-10-02 PROCEDURE — 6370000000 HC RX 637 (ALT 250 FOR IP): Performed by: FAMILY MEDICINE

## 2024-10-02 PROCEDURE — 99222 1ST HOSP IP/OBS MODERATE 55: CPT | Performed by: INTERNAL MEDICINE

## 2024-10-02 RX ORDER — WARFARIN SODIUM 1 MG/1
1 TABLET ORAL
Status: COMPLETED | OUTPATIENT
Start: 2024-10-02 | End: 2024-10-02

## 2024-10-02 RX ADMIN — FAMOTIDINE 20 MG: 20 TABLET ORAL at 13:08

## 2024-10-02 RX ADMIN — SODIUM CHLORIDE, PRESERVATIVE FREE 10 ML: 5 INJECTION INTRAVENOUS at 21:12

## 2024-10-02 RX ADMIN — WARFARIN SODIUM 1 MG: 1 TABLET ORAL at 18:49

## 2024-10-02 RX ADMIN — EMPAGLIFLOZIN 10 MG: 10 TABLET, FILM COATED ORAL at 13:08

## 2024-10-02 RX ADMIN — LEVOTHYROXINE SODIUM 50 MCG: 50 TABLET ORAL at 06:36

## 2024-10-02 RX ADMIN — ACETAMINOPHEN 650 MG: 325 TABLET ORAL at 13:14

## 2024-10-02 ASSESSMENT — PAIN SCALES - WONG BAKER: WONGBAKER_NUMERICALRESPONSE: NO HURT

## 2024-10-02 ASSESSMENT — PAIN - FUNCTIONAL ASSESSMENT: PAIN_FUNCTIONAL_ASSESSMENT: PREVENTS OR INTERFERES SOME ACTIVE ACTIVITIES AND ADLS

## 2024-10-02 ASSESSMENT — PAIN DESCRIPTION - DESCRIPTORS: DESCRIPTORS: ACHING;SHOOTING

## 2024-10-02 ASSESSMENT — PAIN DESCRIPTION - LOCATION: LOCATION: SHOULDER

## 2024-10-02 ASSESSMENT — PAIN DESCRIPTION - ORIENTATION: ORIENTATION: RIGHT

## 2024-10-02 ASSESSMENT — PAIN SCALES - GENERAL: PAINLEVEL_OUTOF10: 10

## 2024-10-02 NOTE — PROGRESS NOTES
[facility]  IN-PATIENT SERVICE   [facility]    Progress Note    10/2/2024    10:12 AM    Name:   Tracee Riggins  MRN:     [id number]     Acct:      [id number]   Room:   [facility]323-01   Day:  5  Admit Date:  9/27/2024  4:58 PM    PCP:   Beverly Ventura DO  Code Status:  Full Code    Subjective:     Patient feels better today, got up to the bathroom successfully.  No significant pain in chest and does feel more energetic denies fever chills no nausea no vomiting good appetite    Medications:     Allergies:    Allergies   Allergen Reactions    Amiodarone Nausea And Vomiting       Current Meds:   Scheduled Meds:    metoprolol tartrate  12.5 mg Oral BID    [Held by provider] furosemide  20 mg IntraVENous Daily    levothyroxine  50 mcg Oral Daily    famotidine  20 mg Oral Daily    sodium chloride flush  5-40 mL IntraVENous 2 times per day    empagliflozin  10 mg Oral Daily    [Held by provider] spironolactone  25 mg Oral Daily    warfarin placeholder: dosing by pharmacy   Other RX Placeholder     Continuous Infusions:    sodium chloride       PRN Meds: metoprolol, sodium chloride flush, sodium chloride, ondansetron **OR** ondansetron, polyethylene glycol, acetaminophen **OR** acetaminophen, perflutren lipid microspheres      ROS:     Less  GENERAL          no fever/chills    sleep was good   less fatigue  SKIN no rash or itching  EYE   vision unremarkable  ENT   no upper airway congestion        nose/throat/ears unremarkable     HEART no chest pain   no obvious dyspnea   no palpitations  LUNGS no dyspnea    with exertion  GI   no abdominal pain   no  nausea/vomiting/diarrhea          no urination issues         MUSCULOSKELETAL no significant neck/back/joint  pain  NEUROLOGIC   not dizzy no headache  weakness improving   no memory concerns  PSYCHIATRIC   no significant anxiety no confusion or depression    VASCULAR less edema   no bruising      Data:     Vitals:  /71    Examination:     General appearance:  alert, cooperative and no distress  Mental Status:  oriented to person, place and time and normal affect  HEENT: normocephalic, throat clear  Lungs:   normal effort, decreased breath sounds at the bases, few rales left base  Heart:  regular rate and rhythm, grade 3/6 systolic murmur left sternal border  Abdomen:  soft, nontender, nondistended, normal bowel sounds, no masses, hepatomegaly, splenomegaly  Extremities:  no significant edema, no redness, no tenderness in the calves, negative Jaclyn's sign bilat  Skin:  no gross lesions, rashes, induration    Assessment:     Hospital Problems             Last Modified POA    * (Principal) Acute on chronic clinical systolic heart failure ([facility]) 9/27/2024 Yes    Paroxysmal atrial fibrillation ([facility]) 9/27/2024 Yes    CKD (chronic kidney disease), stage III ([facility]) 9/27/2024 Yes    Essential hypertension 9/27/2024 Yes    S/P mitral valve replacement with bioprosthetic valve 9/27/2024 Yes    MARE (acute kidney injury) ([facility]) 10/2/2024 Yes       Plan:     Patient feels better since cardioversion, got up with therapy yesterday and will do more today.  Elevation in creatinine is better today, will have her see her regular nephrologist Dr. Leon today to further evaluate.  Hopefully discharge tomorrow if she is doing well.    Medical Decision Making: Jaylen Campbell MD  10/2/2024  10:12 AM

## 2024-10-02 NOTE — PROGRESS NOTES
Pharmacy Note  Warfarin Consult follow-up      Recent Labs     10/02/24  0704   INR 2.4     No results for input(s): \"HGB\", \"HCT\", \"PLT\" in the last 72 hours.    Significant Drug-Drug Interactions:  NA        Notes:                   INR therapeutic at 2.4; will order 1 mg Coumadin today.   Daily PT/INR while inpatient.     Brielle Bonilla, PharmD 10/2/2024 11:36 AM

## 2024-10-02 NOTE — PROGRESS NOTES
Cardiology Progress Note                     Date:   10/2/2024  Patient name: Tracee Riggins  Date of admission:  9/27/2024  4:58 PM  MRN:   [id number]  YOB: 1940  PCP: Beverly Ventura DO    Reason for Admission: CHF, atrial fibrillation with RVR    Subjective:       Patient remains in normal sinus rhythm, heart rate 50-60, blood pressure normal, on room air, creatinine 2.1 this a.m..  Overall clinically she is feeling significantly improved without any chest pain or dyspnea or palpitations    Scheduled Meds:   metoprolol tartrate  12.5 mg Oral BID    [Held by provider] furosemide  20 mg IntraVENous Daily    levothyroxine  50 mcg Oral Daily    famotidine  20 mg Oral Daily    sodium chloride flush  5-40 mL IntraVENous 2 times per day    empagliflozin  10 mg Oral Daily    [Held by provider] spironolactone  25 mg Oral Daily    warfarin placeholder: dosing by pharmacy   Other RX Placeholder       Continuous Infusions:   sodium chloride         Labs:     CBC: No results for input(s): \"WBC\", \"HGB\", \"PLT\" in the last 72 hours.  BMP:    Recent Labs     10/01/24  0518 10/01/24  1335 10/02/24  0704    141 141   K 4.0 4.3 3.9    101 103   CO2 29 26 26   BUN 72* 72* 74*   CREATININE 2.4* 2.2* 2.1*   GLUCOSE 142* 140* 117*     Hepatic:   Recent Labs     09/30/24  0534   AST 20   ALT 19   BILITOT 0.7   ALKPHOS 101     Troponin: No results for input(s): \"TROPONINI\" in the last 72 hours.  BNP: No results for input(s): \"BNP\" in the last 72 hours.  Lipids: No results for input(s): \"CHOL\", \"HDL\" in the last 72 hours.    Invalid input(s): \"LDLCALCU\"  INR:   Recent Labs     09/30/24  0534 10/01/24  0518 10/02/24  0704   INR 2.3 2.1 2.4         Objective:     Vitals: /71   Pulse (!) 48   Temp 97.3 °F (36.3 °C) (Oral)   Resp 16   Ht 1.346 m (4' 5\")   Wt 40.7 kg (89 lb 11.6 oz)   SpO2 97%   BMI 22.46 kg/m²     General appearance: awake, alert, in no apparent respiratory distress

## 2024-10-02 NOTE — PROGRESS NOTES
Physical Therapy  Facility/Department: [address]  Physical Therapy Daily Progress Note    Name: Tracee Riggins  : 1940  MRN: [id number]  Date of Service: 10/2/2024    Discharge Recommendations:  Home with assist PRN   PT Equipment Recommendations  Equipment Needed: No      Patient Diagnosis(es): The primary encounter diagnosis was Acute congestive heart failure, unspecified heart failure type (HCC). Diagnoses of Elevated troponin, Stage 3b chronic kidney disease (HCC), and Atrial fibrillation (HCC) were also pertinent to this visit.  Past Medical History:  has a past medical history of Chronic kidney disease, Gout, Gout, Hypertension, Lumbar disc disease, Thrombocytopenia (HCC), and Vitamin D deficiency.  Past Surgical History:  has a past surgical history that includes CABG with Mitral Valve Replacement and Cataract removal.    Assessment  Body Structures, Functions, Activity Limitations Requiring Skilled Therapeutic Intervention: Decreased functional mobility ;Decreased endurance  Assessment: Pt presents with decreased activity tolerance due to admission for acute on chronic CHF. Pt currently is able to transfer and ambulate without device with Supervision. Pt will be able to prior living arrangements after discharge from the hospital.  Therapy Prognosis: Excellent  Requires PT Follow-Up: Yes  Activity Tolerance  Activity Tolerance: Patient tolerated treatment well    Plan  Physical Therapy Plan  General Plan: 3-5 times per week  Current Treatment Recommendations: Functional mobility training, Endurance training, Gait training, Stair training, Safety education & training, Patient/Caregiver education & training, Therapeutic activities  Safety Devices  Type of Devices: Call light within reach, Gait belt, Left in chair, Nurse notified  Restraints  Restraints Initially in Place: No    Restrictions  Restrictions/Precautions  Restrictions/Precautions: General Precautions  Required Braces or Orthoses?:

## 2024-10-02 NOTE — PLAN OF CARE
Problem: Discharge Planning  Goal: Discharge to home or other facility with appropriate resources  10/1/2024 2246 by Re Verma RN  Outcome: Progressing  10/1/2024 1349 by Fartun Toribio RN  Outcome: Progressing     Problem: Safety - Adult  Goal: Free from fall injury  10/1/2024 2246 by Re Verma RN  Outcome: Progressing  10/1/2024 1349 by Fartun Toribio RN  Outcome: Progressing     Problem: ABCDS Injury Assessment  Goal: Absence of physical injury  10/1/2024 2246 by Re Verma RN  Outcome: Progressing  10/1/2024 1349 by Fartun Toribio RN  Outcome: Progressing     Problem: Respiratory - Adult  Goal: Achieves optimal ventilation and oxygenation  10/1/2024 2246 by Re Verma RN  Outcome: Progressing  10/1/2024 1349 by Fartun Toribio RN  Outcome: Progressing     Problem: Cardiovascular - Adult  Goal: Maintains optimal cardiac output and hemodynamic stability  10/1/2024 2246 by Re Verma RN  Outcome: Progressing  10/1/2024 1349 by Fartun Toribio RN  Outcome: Progressing     Problem: Skin/Tissue Integrity - Adult  Goal: Skin integrity remains intact  10/1/2024 2246 by Re Verma RN  Outcome: Progressing  10/1/2024 1349 by Fartun Toribio RN  Outcome: Progressing     Problem: Metabolic/Fluid and Electrolytes - Adult  Goal: Electrolytes maintained within normal limits  10/1/2024 2246 by Re Verma RN  Outcome: Progressing  10/1/2024 1349 by Fartun Toribio RN  Outcome: Progressing  Goal: Hemodynamic stability and optimal renal function maintained  10/1/2024 2246 by Re Verma RN  Outcome: Progressing  10/1/2024 1349 by Fartun Toribio RN  Outcome: Progressing     Problem: Musculoskeletal - Adult  Goal: Return mobility to safest level of function  10/1/2024 2246 by Re Verma RN  Outcome: Progressing  10/1/2024 1349 by Fartun Toribio RN  Outcome: Progressing     Problem: Chronic Conditions and Co-morbidities  Goal: Patient's chronic conditions  and co-morbidity symptoms are monitored and maintained or improved  10/1/2024 2246 by Re Verma RN  Outcome: Progressing  10/1/2024 1349 by Fartun Toribio RN  Outcome: Progressing     Problem: Discharge Planning  Goal: Discharge to home or other facility with appropriate resources  10/1/2024 2246 by Re Verma RN  Outcome: Progressing  10/1/2024 1349 by Fartun Toribio RN  Outcome: Progressing     Problem: Safety - Adult  Goal: Free from fall injury  10/1/2024 2246 by Re Verma RN  Outcome: Progressing  10/1/2024 1349 by Fartun Toribio RN  Outcome: Progressing     Problem: ABCDS Injury Assessment  Goal: Absence of physical injury  10/1/2024 2246 by Re Verma RN  Outcome: Progressing  10/1/2024 1349 by Fartun Toribio RN  Outcome: Progressing     Problem: Respiratory - Adult  Goal: Achieves optimal ventilation and oxygenation  10/1/2024 2246 by Re Verma RN  Outcome: Progressing  10/1/2024 1349 by Fartun Toribio RN  Outcome: Progressing     Problem: Cardiovascular - Adult  Goal: Maintains optimal cardiac output and hemodynamic stability  10/1/2024 2246 by Re Verma RN  Outcome: Progressing  10/1/2024 1349 by Fartun Toribio RN  Outcome: Progressing     Problem: Skin/Tissue Integrity - Adult  Goal: Skin integrity remains intact  10/1/2024 2246 by Re Verma RN  Outcome: Progressing  10/1/2024 1349 by Fartun Toribio RN  Outcome: Progressing     Problem: Metabolic/Fluid and Electrolytes - Adult  Goal: Electrolytes maintained within normal limits  10/1/2024 2246 by Re Verma RN  Outcome: Progressing  10/1/2024 1349 by Fartun Toribio RN  Outcome: Progressing  Goal: Hemodynamic stability and optimal renal function maintained  10/1/2024 2246 by Re Verma RN  Outcome: Progressing  10/1/2024 1349 by Fartun Toribio RN  Outcome: Progressing     Problem: Musculoskeletal - Adult  Goal: Return mobility to safest level of function  10/1/2024 2246

## 2024-10-02 NOTE — CONSULTS
Cancer Mother         breast Cancer 80's    Breast Cancer Mother     Cancer Father        Review of Systems:    Constitutional: No fever, no chills, no lethargy, no weakness.  HEENT:  No headache, otalgia, itchy eyes, nasal discharge or sore throat.  Cardiac:  No chest pain, dyspnea, orthopnea or PND.  Chest:              No cough, phlegm or wheezing.  Abdomen:  No abdominal pain, nausea or vomiting.  Neuro:  No focal weakness, abnormal movements orseizure like activity.  Skin:   No rashes, no itching.  :   No hematuria, no pyuria, no dysuria, no flank pain.  Extremities:  Mild lower extreme swelling .      Objective:  CURRENT TEMPERATURE:  Temp: 97.3 °F (36.3 °C)  MAXIMUM TEMPERATURE OVER 24HRS:  Temp (24hrs), Av.5 °F (36.4 °C), Min:97.3 °F (36.3 °C), Max:97.7 °F (36.5 °C)    CURRENT RESPIRATORY RATE:  Respirations: 16  CURRENT PULSE:  Pulse: (!) 48  CURRENT BLOOD PRESSURE:  BP: 134/71  24HR BLOOD PRESSURE RANGE:  Systolic (24hrs), Av , Min:105 , Max:134   ; Diastolic (24hrs), Av, Min:56, Max:71    24HR INTAKE/OUTPUT:    Intake/Output Summary (Last 24 hours) at 10/2/2024 1022  Last data filed at 10/2/2024 0447  Gross per 24 hour   Intake 744.5 ml   Output 200 ml   Net 544.5 ml     Patient Vitals for the past 96 hrs (Last 3 readings):   Weight   10/01/24 0715 40.7 kg (89 lb 11.6 oz)     Physical Exam:  General appearance:Awake, alert, in no acute distress  Skin: warm and dry, no rash or erythema  Eyes: conjunctivae normal and sclera anicteric  ENT: :no thrush no pharyngeal congestion    Neck: no carotid bruit ,no carotid Lymphadenopathy, noThyromegaly   Pulmonary: no wheezing or rhonchi. No rales heard.  Cardiovascular: Direct S1 & S2,  No S3 or  S4, no Pericardial Rub + systolic murmur   Abdomen: soft nontender, bowel sounds present, no organomegaly,  no ascites  Extremities: Slight edema    Labs:    BMP:   Recent Labs     10/01/24  0518 10/01/24  1335 10/02/24  0704    141 141   K 4.0 4.3 3.9  the assistance of a speech-recognition program. While intending to generate a document that actually reflects the content of the visit, no guarantees can be provided that every mistake has been identified and corrected by editing    Electronically signed by Rao Leon MD on 10/2/2024 at 10:22 AM

## 2024-10-02 NOTE — CARE COORDINATION
Met with patient regarding transitional care. Patient reports she is doing well and is planning on discharging home with her son. States her son will transport home and assist with anything she needs. She denies needs from CM at this time.

## 2024-10-03 ENCOUNTER — APPOINTMENT (OUTPATIENT)
Dept: CT IMAGING | Age: 84
DRG: 291 | End: 2024-10-03
Payer: MEDICARE

## 2024-10-03 ENCOUNTER — APPOINTMENT (OUTPATIENT)
Dept: GENERAL RADIOLOGY | Age: 84
DRG: 291 | End: 2024-10-03
Payer: MEDICARE

## 2024-10-03 LAB
ANION GAP SERPL CALCULATED.3IONS-SCNC: 13 MMOL/L (ref 9–17)
BUN SERPL-MCNC: 78 MG/DL (ref 8–23)
CALCIUM SERPL-MCNC: 8.8 MG/DL (ref 8.6–10.4)
CHLORIDE SERPL-SCNC: 98 MMOL/L (ref 98–107)
CO2 SERPL-SCNC: 25 MMOL/L (ref 20–31)
CREAT SERPL-MCNC: 1.9 MG/DL (ref 0.5–0.9)
ERYTHROCYTE [DISTWIDTH] IN BLOOD BY AUTOMATED COUNT: 14.4 % (ref 12.5–15.4)
GFR, ESTIMATED: 26 ML/MIN/1.73M2
GLUCOSE BLD-MCNC: 111 MG/DL (ref 65–105)
GLUCOSE SERPL-MCNC: 108 MG/DL (ref 70–99)
HCT VFR BLD AUTO: 39.8 % (ref 36–46)
HGB BLD-MCNC: 12.7 G/DL (ref 12–16)
INR PPP: 3.3
MCH RBC QN AUTO: 28.4 PG (ref 26–34)
MCHC RBC AUTO-ENTMCNC: 31.9 G/DL (ref 31–37)
MCV RBC AUTO: 89.2 FL (ref 80–100)
PLATELET # BLD AUTO: 107 K/UL (ref 140–450)
PMV BLD AUTO: 11.2 FL (ref 6–12)
POTASSIUM SERPL-SCNC: 4.3 MMOL/L (ref 3.7–5.3)
PROTHROMBIN TIME: 31.9 SEC (ref 9.4–12.6)
RBC # BLD AUTO: 4.46 M/UL (ref 4–5.2)
SODIUM SERPL-SCNC: 136 MMOL/L (ref 135–144)
WBC OTHER # BLD: 5 K/UL (ref 3.5–11)

## 2024-10-03 PROCEDURE — 99232 SBSQ HOSP IP/OBS MODERATE 35: CPT

## 2024-10-03 PROCEDURE — 6370000000 HC RX 637 (ALT 250 FOR IP): Performed by: NURSE PRACTITIONER

## 2024-10-03 PROCEDURE — 82947 ASSAY GLUCOSE BLOOD QUANT: CPT

## 2024-10-03 PROCEDURE — 85027 COMPLETE CBC AUTOMATED: CPT

## 2024-10-03 PROCEDURE — 80048 BASIC METABOLIC PNL TOTAL CA: CPT

## 2024-10-03 PROCEDURE — 85610 PROTHROMBIN TIME: CPT

## 2024-10-03 PROCEDURE — 74176 CT ABD & PELVIS W/O CONTRAST: CPT

## 2024-10-03 PROCEDURE — 93005 ELECTROCARDIOGRAM TRACING: CPT | Performed by: FAMILY MEDICINE

## 2024-10-03 PROCEDURE — 36415 COLL VENOUS BLD VENIPUNCTURE: CPT

## 2024-10-03 PROCEDURE — 2580000003 HC RX 258: Performed by: NURSE PRACTITIONER

## 2024-10-03 PROCEDURE — 2060000000 HC ICU INTERMEDIATE R&B

## 2024-10-03 PROCEDURE — 74018 RADEX ABDOMEN 1 VIEW: CPT

## 2024-10-03 PROCEDURE — 99232 SBSQ HOSP IP/OBS MODERATE 35: CPT | Performed by: INTERNAL MEDICINE

## 2024-10-03 PROCEDURE — 6370000000 HC RX 637 (ALT 250 FOR IP): Performed by: FAMILY MEDICINE

## 2024-10-03 RX ORDER — FUROSEMIDE 20 MG
20 TABLET ORAL DAILY
Status: DISCONTINUED | OUTPATIENT
Start: 2024-10-04 | End: 2024-10-04 | Stop reason: HOSPADM

## 2024-10-03 RX ORDER — WARFARIN SODIUM 1 MG/1
0.5 TABLET ORAL
Status: COMPLETED | OUTPATIENT
Start: 2024-10-03 | End: 2024-10-03

## 2024-10-03 RX ORDER — METOPROLOL SUCCINATE 25 MG/1
25 TABLET, EXTENDED RELEASE ORAL DAILY
Status: DISCONTINUED | OUTPATIENT
Start: 2024-10-03 | End: 2024-10-03

## 2024-10-03 RX ORDER — METOPROLOL SUCCINATE 25 MG/1
12.5 TABLET, EXTENDED RELEASE ORAL DAILY
Status: DISCONTINUED | OUTPATIENT
Start: 2024-10-04 | End: 2024-10-04 | Stop reason: HOSPADM

## 2024-10-03 RX ADMIN — LEVOTHYROXINE SODIUM 50 MCG: 50 TABLET ORAL at 05:14

## 2024-10-03 RX ADMIN — WARFARIN SODIUM 0.5 MG: 1 TABLET ORAL at 17:57

## 2024-10-03 RX ADMIN — EMPAGLIFLOZIN 10 MG: 10 TABLET, FILM COATED ORAL at 09:09

## 2024-10-03 RX ADMIN — POLYETHYLENE GLYCOL 3350 17 G: 17 POWDER, FOR SOLUTION ORAL at 05:14

## 2024-10-03 RX ADMIN — SODIUM CHLORIDE, PRESERVATIVE FREE 10 ML: 5 INJECTION INTRAVENOUS at 09:10

## 2024-10-03 RX ADMIN — FAMOTIDINE 20 MG: 20 TABLET ORAL at 09:09

## 2024-10-03 RX ADMIN — SODIUM CHLORIDE, PRESERVATIVE FREE 10 ML: 5 INJECTION INTRAVENOUS at 21:35

## 2024-10-03 ASSESSMENT — ENCOUNTER SYMPTOMS
CHEST TIGHTNESS: 0
NAUSEA: 0
SHORTNESS OF BREATH: 0
SORE THROAT: 0
CONSTIPATION: 0
DIARRHEA: 0
RHINORRHEA: 0
VOMITING: 0

## 2024-10-03 NOTE — PROGRESS NOTES
HEENT: Head: Normocephalic, no lesions, without obvious abnormality  Neck: no JVD  Lungs: clear to auscultation bilaterally, no basilar rales, no wheezing   Heart: regular rate and rhythm, S1, S2 normal, grade 3/6 ejection solid murmur in the left parasternal border  Abdomen: soft, non-tender; bowel sounds normal  Extremities: No LE edema  Neurologic: Mental status: Alert, oriented. Motor and sensory not done.       Cardiac testing:     EKG 9/27/24:  atrial fibrillation with rvr              Echocardiogram 8/30/2024    Left Ventricle: Mildly reduced left ventricular systolic function. EF by visual approximation is 45%. Left ventricle size is normal. Mildly increased wall thickness. Mild global hypokinesis present.    Aortic Valve: Not well visualized. Moderate regurgitation.    Mitral Valve: Bovine bioprosthetic valve that is well-seated with severely thickened leaflets and a size of 25 mm with an increased mean gradient of 11 mmHg across the valve that is abnormal for size and type. Significant regurgitaiton seen with spectral Doppler with a max velocity of 4.66 m/s and mean gradient of 59 mmHg and multiple jets seen with color Doppler. MV mean gradient is 11 mmHg.    Tricuspid Valve: Thickened leaflets. Severe regurgitation. Moderately elevated RVSP, consistent with moderate pulmonary hypertension. The estimated RVSP is 54 mmHg.    Left Atrium: Left atrium is severely dilated.    Right Atrium: Right atrium is dilated.    IVC/SVC: IVC diameter is greater than 21 mm and decreases less than 50% during inspiration; therefore the estimated right atrial pressure is elevated (~15 mmHg).    Image quality is adequate.      Impression:   Persistent atrial fibrillation with rapid ventricular response status post DC cardioversion to normal sinus  Acute on chronic heart failure with mildly reduced ejection fraction, resolved  Acute kidney injury post diuresis  History of bioprosthetic mitral valve replacement in 1986 and  2009  Moderate to severe mitral regurgitation of prosthetic valve  Chronic kidney disease  Moderate pulmonary hypertension   Severe tricuspid regurgitation  Hypokalemia     Patient Active Problem List:     Paroxysmal atrial fibrillation (HCC)     Chronic systolic congestive heart failure (HCC)     SOB (shortness of breath)     Pulmonary hypertension (HCC)     Bilateral lower extremity edema     CKD (chronic kidney disease), stage III (HCC)     Essential hypertension     COVID-19     Encounter for therapeutic drug level monitoring     Localized edema     Long term (current) use of anticoagulants     Right upper quadrant pain     Abdominal pain     Back pain     CHF (congestive heart failure) (HCC)     Heart valve disorder     Palpitations     Renal cyst     S/P mitral valve replacement with bioprosthetic valve     Tachycardia     Gout     Lumbar disc disease     Vitamin D deficiency     Thrombocytopenia (HCC)     Acute on chronic clinical systolic heart failure (HCC)        Plan:   Continue to hold diuresis for another 48 hours and resume p.o. Lasix 20 mg daily after  Change Lopressor to Toprol-XL 25 mg daily  Therapeutic anticoagulation with warfarin  Okay to discharge home from cardiology perspective      Ronit Jaime, APRN - CNP    [facility] [place]

## 2024-10-03 NOTE — PROGRESS NOTES
Pharmacy Note  Warfarin Consult follow-up      Recent Labs     10/03/24  0735   INR 3.3     No results for input(s): \"HGB\", \"HCT\", \"PLT\" in the last 72 hours.    Significant Drug-Drug Interactions:  Synthroid    Notes:                     -INR supra-therapeutic jumped from 2.4 to 3.3, will give reduced dose of 0.5 mg for 10/3.  -Ordered CBC     Daily PT/INR while inpatient.      Thank you,    JORGE L PHILLIPS, [facility]

## 2024-10-03 NOTE — PROGRESS NOTES
Nephrology Progress Note      SUBJECTIVE       Pt was seen and examined.   She tells me she did not sleep well but denied any chest pain, shortness of breath nausea or vomiting.  All in all this morning she was without O2 and looking pretty comfortable.  Fluids were discontinued yesterday.  This morning labs are currently still pending.  My suspicion is that she will do better with daily small dose of loop diuretic.  In the past she had been somewhat resistive to loop diuretics because of frequency of urination but does understand that she needs it.  I also feel that her serum creatinine will probably reset maybe a touch higher than previous with the loop diuretic.    Brief history:  83-year-old lady with a known history of CKD stage IIIb with a baseline creatinine of around 1.5 or thereabouts presented to the hospital with shortness of breath, fatigue.  She was noted to be in A-fib with RVR and evidence of volume overload.  She was diuresed with IV Lasix and received Cardizem for heart rate control.  She also got cardioverted during this hospitalization.  She stays in A-fib but heart rate is in the 80s now.  She did suffer an MARE with a creatinine that went up to 2.8 it is now down to 2.1 yesterday.  Chest x-rays did show evidence of bilateral pleural effusions which had improved with diuresis  Cardiac echo showed EF of 45% with severe MR and severe TR, moderate pulmonary hypertension      OBJECTIVE      CURRENT TEMPERATURE:  Temp: 97.3 °F (36.3 °C)  MAXIMUM TEMPERATURE OVER 24HRS:  Temp (24hrs), Av.4 °F (36.3 °C), Min:97.3 °F (36.3 °C), Max:97.6 °F (36.4 °C)    CURRENT RESPIRATORY RATE:  Respirations: 16  CURRENT PULSE:  Pulse: 50  CURRENT BLOOD PRESSURE:  BP: (!) 159/75  24HR BLOOD PRESSURE RANGE:  Systolic (24hrs), Av , Min:130 , Max:159   ; Diastolic (24hrs), Av, Min:67, Max:75    24HR INTAKE/OUTPUT:    Intake/Output Summary (Last 24 hours) at 10/3/2024 0952  Last data filed at 10/3/2024  guarantees can be provided that every mistake has been identified and corrected by editing    Electronically signed by Rao Leon MD on 10/3/2024 at 9:13 AM

## 2024-10-03 NOTE — CARE COORDINATION
[facility] Quality Flow/Interdisciplinary Rounds Progress Note    Quality Flow Rounds held on October 3, 2024 at 0930    Disciplines Attending:  Bedside Nurse, , and Nursing Unit Leadership    Barriers to Discharge: Clinical status    Anticipated Discharge Date:   10/3/24    Anticipated Discharge Disposition: Home    Readmission Risk              Risk of Unplanned Readmission:  15           Discussed patient goal for the day, patient clinical progression, and barriers to discharge. DC pending labs per nephrology.       Twyla Beckwith RN  October 3, 2024

## 2024-10-04 ENCOUNTER — TELEPHONE (OUTPATIENT)
Dept: SURGERY | Age: 84
End: 2024-10-04

## 2024-10-04 ENCOUNTER — TELEPHONE (OUTPATIENT)
Age: 84
End: 2024-10-04

## 2024-10-04 ENCOUNTER — TELEPHONE (OUTPATIENT)
Dept: ADMINISTRATIVE | Age: 84
End: 2024-10-04

## 2024-10-04 VITALS
HEART RATE: 58 BPM | WEIGHT: 89.73 LBS | RESPIRATION RATE: 14 BRPM | TEMPERATURE: 97.7 F | BODY MASS INDEX: 20.77 KG/M2 | DIASTOLIC BLOOD PRESSURE: 80 MMHG | OXYGEN SATURATION: 99 % | SYSTOLIC BLOOD PRESSURE: 114 MMHG | HEIGHT: 55 IN

## 2024-10-04 LAB
EKG ATRIAL RATE: 163 BPM
EKG ATRIAL RATE: 500 BPM
EKG Q-T INTERVAL: 354 MS
EKG Q-T INTERVAL: 530 MS
EKG QRS DURATION: 86 MS
EKG QRS DURATION: 88 MS
EKG QTC CALCULATION (BAZETT): 473 MS
EKG QTC CALCULATION (BAZETT): 479 MS
EKG R AXIS: 31 DEGREES
EKG R AXIS: 41 DEGREES
EKG T AXIS: 164 DEGREES
EKG T AXIS: 89 DEGREES
EKG VENTRICULAR RATE: 110 BPM
EKG VENTRICULAR RATE: 48 BPM
INR PPP: 3
PROTHROMBIN TIME: 29.1 SEC (ref 9.4–12.6)

## 2024-10-04 PROCEDURE — 36415 COLL VENOUS BLD VENIPUNCTURE: CPT

## 2024-10-04 PROCEDURE — 99232 SBSQ HOSP IP/OBS MODERATE 35: CPT

## 2024-10-04 PROCEDURE — 6370000000 HC RX 637 (ALT 250 FOR IP): Performed by: NURSE PRACTITIONER

## 2024-10-04 PROCEDURE — 99232 SBSQ HOSP IP/OBS MODERATE 35: CPT | Performed by: INTERNAL MEDICINE

## 2024-10-04 PROCEDURE — 93005 ELECTROCARDIOGRAM TRACING: CPT | Performed by: FAMILY MEDICINE

## 2024-10-04 PROCEDURE — 6370000000 HC RX 637 (ALT 250 FOR IP): Performed by: FAMILY MEDICINE

## 2024-10-04 PROCEDURE — 6370000000 HC RX 637 (ALT 250 FOR IP): Performed by: INTERNAL MEDICINE

## 2024-10-04 PROCEDURE — 2580000003 HC RX 258: Performed by: NURSE PRACTITIONER

## 2024-10-04 PROCEDURE — 99232 SBSQ HOSP IP/OBS MODERATE 35: CPT | Performed by: FAMILY MEDICINE

## 2024-10-04 PROCEDURE — 85610 PROTHROMBIN TIME: CPT

## 2024-10-04 RX ORDER — WARFARIN SODIUM 1 MG/1
1 TABLET ORAL
Status: DISCONTINUED | OUTPATIENT
Start: 2024-10-04 | End: 2024-10-04 | Stop reason: HOSPADM

## 2024-10-04 RX ORDER — METOPROLOL SUCCINATE 25 MG/1
12.5 TABLET, EXTENDED RELEASE ORAL DAILY
Qty: 30 TABLET | Refills: 3 | Status: SHIPPED | OUTPATIENT
Start: 2024-10-05

## 2024-10-04 RX ORDER — FUROSEMIDE 20 MG
20 TABLET ORAL DAILY
Qty: 60 TABLET | Refills: 3 | Status: SHIPPED | OUTPATIENT
Start: 2024-10-05

## 2024-10-04 RX ORDER — LEVOTHYROXINE SODIUM 50 UG/1
50 TABLET ORAL DAILY
Qty: 30 TABLET | Refills: 3 | Status: SHIPPED | OUTPATIENT
Start: 2024-10-05

## 2024-10-04 RX ADMIN — EMPAGLIFLOZIN 10 MG: 10 TABLET, FILM COATED ORAL at 08:51

## 2024-10-04 RX ADMIN — SODIUM CHLORIDE, PRESERVATIVE FREE 10 ML: 5 INJECTION INTRAVENOUS at 08:52

## 2024-10-04 RX ADMIN — FUROSEMIDE 20 MG: 20 TABLET ORAL at 08:50

## 2024-10-04 RX ADMIN — METOPROLOL SUCCINATE 12.5 MG: 25 TABLET, EXTENDED RELEASE ORAL at 08:51

## 2024-10-04 RX ADMIN — POLYETHYLENE GLYCOL 3350 17 G: 17 POWDER, FOR SOLUTION ORAL at 06:14

## 2024-10-04 RX ADMIN — FAMOTIDINE 20 MG: 20 TABLET ORAL at 08:51

## 2024-10-04 RX ADMIN — LEVOTHYROXINE SODIUM 50 MCG: 50 TABLET ORAL at 06:11

## 2024-10-04 ASSESSMENT — PAIN SCALES - GENERAL: PAINLEVEL_OUTOF10: 0

## 2024-10-04 NOTE — PROGRESS NOTES
Updated Dr. Medina that patient may go per cardiology via perfect serve.      1336: Per Dr. Medina, orders have been placed.

## 2024-10-04 NOTE — TELEPHONE ENCOUNTER
Pt's son Enrique called stating pt is being discharged from hospital today and needs a 2 week follow up with Dr. Diaz. Please return his call to schedule.

## 2024-10-04 NOTE — PROGRESS NOTES
Nephrology Progress Note      SUBJECTIVE       Pt was seen and examined. No acute issues overnite. Stable hemodynamics .   Yesterday she complained of some mid abdominal pain, had CT scan showing some evidence of ascites and constipation.  Feels a little better this morning.  Was ambulating in the room without any lightheadedness dizziness or chest pain.  Earlier this morning she did have A-fib with a heart rate that was little over 800 now she has settled down in the 80s again.  Lasix 20 mg was started as of this morning on a daily basis.  Serum creatinine yesterday was 1.9.  She continues to be in A-fib.    Brief history:  83-year-old lady with a known history of CKD stage IIIb with a baseline creatinine of around 1.5 or thereabouts presented to the hospital with shortness of breath, fatigue.  She was noted to be in A-fib with RVR and evidence of volume overload.  She was diuresed with IV Lasix and received Cardizem for heart rate control.  She also got cardioverted during this hospitalization.  She stays in A-fib but heart rate is in the 80s now.  She did suffer an MARE with a creatinine that went up to 2.8 it is now down to 2.1 yesterday.  Chest x-rays did show evidence of bilateral pleural effusions which had improved with diuresis  Cardiac echo showed EF of 45% with severe MR and severe TR, moderate pulmonary hypertension      OBJECTIVE      CURRENT TEMPERATURE:  Temp: 97.7 °F (36.5 °C)  MAXIMUM TEMPERATURE OVER 24HRS:  Temp (24hrs), Av.4 °F (36.3 °C), Min:97 °F (36.1 °C), Max:97.7 °F (36.5 °C)    CURRENT RESPIRATORY RATE:  Respirations: 14  CURRENT PULSE:  Pulse: (!) 111  CURRENT BLOOD PRESSURE:  BP: (!) 136/92  24HR BLOOD PRESSURE RANGE:  Systolic (24hrs), Av , Min:121 , Max:146   ; Diastolic (24hrs), Av, Min:57, Max:92    24HR INTAKE/OUTPUT:    Intake/Output Summary (Last 24 hours) at 10/4/2024 1045  Last data filed at 10/4/2024 0850  Gross per 24 hour   Intake 100 ml   Output 1000 ml   Net -900    CREATININE 2.2* 2.1* 1.9*   GLUCOSE 140* 117* 108*   CALCIUM 9.2 8.8 8.8      MAGNESIUM:   Recent Labs     10/02/24  0704   MG 2.4     IRON:    Lab Results   Component Value Date/Time    IRON 29 09/28/2024 08:26 AM       TIBC:    Lab Results   Component Value Date/Time    TIBC 425 09/28/2024 08:26 AM       URINALYSIS:  U/A:   Lab Results   Component Value Date/Time    NITRU NEGATIVE 08/30/2024 02:45 AM    COLORU Yellow 08/30/2024 02:45 AM    PHUR 7.5 08/30/2024 02:45 AM    LEUKOCYTESUR NEGATIVE 08/30/2024 02:45 AM    UROBILINOGEN Normal 08/30/2024 02:45 AM    BILIRUBINUR NEGATIVE 08/30/2024 02:45 AM    GLUCOSEU NEGATIVE 08/30/2024 02:45 AM    KETUA NEGATIVE 08/30/2024 02:45 AM         RADIOLOGY      Reviewed as available.      ASSESSMENT      1. MARE on top of CKD presumably because of decreased circulatory volume from decompensated heart failure and cardiac arrhythmia ( Afib with RVR )  2.  Patient presented with A-fib with RVR and weakness and fatigue.  During this admission she initially required Cardizem IV infusion and then subsequently got cardioverted.  She is still in A-fib although rate is better controlled  3. HYPERTENSION blood pressures are decent  4.  Cardiac echo showing evidence of 45% EF with severe MR and also severe TR with moderate pulmonary hypertension  5.  History of CKD secondary likely to ischemic nephrosclerosis    PLAN      1.  Lasix initiated as of this morning to be taken daily.   2.  From my side she is okay for discharge  3. Follow up labs ordered.   4.  Follow-up with me in the office next month she already has an appointment set up.      Please do not hesitate to call with questions.    This note is created with the assistance of a speech-recognition program. While intending to generate a document that actually reflects the content of the visit, no guarantees can be provided that every mistake has been identified and corrected by editing    Electronically signed by Rao Leon MD

## 2024-10-04 NOTE — DISCHARGE INSTR - DIET
Good nutrition is important when healing from an illness, injury, or surgery.  Follow any nutrition recommendations given to you during your hospital stay.   If you were given an oral nutrition supplement while in the hospital, continue to take this supplement at home.  You can take it with meals, in-between meals, and/or before bedtime. These supplements can be purchased at most local grocery stores, pharmacies, and chain [employer]-stores.   If you have any questions about your diet or nutrition, call the hospital and ask for the dietitian.      General diet, no added salt

## 2024-10-04 NOTE — TELEPHONE ENCOUNTER
Patient is scheduled 2/18/2025 and needs to be seen by 10/18/2024 per Dr Diaz  Please contact patient to schedule for an earlier appt     PLEASE CALL [phone]

## 2024-10-04 NOTE — TELEPHONE ENCOUNTER
Care Transitions Initial Follow Up Call    Outreach made within 2 business days of discharge: Yes    Patient: Tracee Riggins Patient : 1940   MRN: [id number]  Reason for Admission: A.Fib/swelling  Discharge Date: 10.2024       Spoke with: Patient    Discharge department/facility: [facility]    TCM Interactive Patient Contact:  Was patient able to fill all prescriptions: Yes  Was patient instructed to bring all medications to the follow-up visit: Yes  Is patient taking all medications as directed in the discharge summary? Yes  Does patient understand their discharge instructions: Yes  Does patient have questions or concerns that need addressed prior to 7-14 day follow up office visit: no    Additional needs identified to be addressed with provider  No needs identified             Scheduled appointment with PCP within 7-14 days    Follow Up  Future Appointments   Date Time Provider Department Center   10/8/2024  1:20 PM Lo Medina MD [place] [facility] DEP   2024 10:30 AM Deb Reza APRN - CNP [facility] None   2024 11:00 AM Beverly Ventura DO [place] [facility] DEP   2025  1:30 PM Ayde Diaz MD [facility]       Kayleigh Penn

## 2024-10-04 NOTE — PROGRESS NOTES
Pt alert, oriented and medically stable for discharge. Pt items sent home with patient including clothing, shoes, paperwork, cell phone,  . Reviewed with patient and family discharge instructions, new medications and their side effects as well as follow up appointments. Pt to be transported via wheelchair to main entrance. IV removed.

## 2024-10-04 NOTE — PROGRESS NOTES
Pharmacy Note  Warfarin Consult follow-up      Recent Labs     10/04/24  0737   INR 3.0     Recent Labs     10/03/24  0735   HGB 12.7   HCT 39.8   *       Significant Drug-Drug Interactions:  Synthroid    Notes:                     -INR therapeutic at 3 will give reduced home dose of warfarin 1 mg for 10/4.     Daily PT/INR while inpatient.      Thank you,    JORGE L PHILLIPS, [facility]

## 2024-10-04 NOTE — PROGRESS NOTES
Output 1000 ml   Net -1000 ml       Labs:  Hematology:  Recent Labs     10/02/24  0704 10/03/24  0735 10/04/24  0737   WBC  --  5.0  --    RBC  --  4.46  --    HGB  --  12.7  --    HCT  --  39.8  --    MCV  --  89.2  --    MCH  --  28.4  --    MCHC  --  31.9  --    RDW  --  14.4  --    PLT  --  107*  --    MPV  --  11.2  --    INR 2.4 3.3 3.0     Chemistry:  Recent Labs     10/01/24  1335 10/02/24  0704 10/03/24  0735    141 136   K 4.3 3.9 4.3    103 98   CO2 26 26 25   GLUCOSE 140* 117* 108*   BUN 72* 74* 78*   CREATININE 2.2* 2.1* 1.9*   MG  --  2.4  --    ANIONGAP 14 12 13   LABGLOM 21* 23* 26*   CALCIUM 9.2 8.8 8.8   PROBNP  --  3,302*  --      Recent Labs     10/03/24  0909   POCGLU 111*     ABG:No results found for: \"POCPH\", \"PHART\", \"PH\", \"POCPCO2\", \"MDZ9ALN\", \"PCO2\", \"POCPO2\", \"PO2ART\", \"PO2\", \"POCHCO3\", \"SLE9NZI\", \"HCO3\", \"NBEA\", \"PBEA\", \"BEART\", \"BE\", \"THGBART\", \"THB\", \"TOA5PEH\", \"DXKT8YXV\", \"E8PURRHY\", \"O2SAT\", \"FIO2\"  No results found for: \"SPECIAL\"  No results found for: \"CULTURE\"    Radiology:  CT ABDOMEN PELVIS WO CONTRAST Additional Contrast? Radiologist Recommendation    Result Date: 10/3/2024  1. No free air is identified. 2. Marked cardiomegaly is identified, with bilateral pleural effusions which appear moderate on the right and small on the left, as well as IVC and hepatic vein distension, intra-abdominal and pelvic ascites and mesenteric edema as well as diffuse anasarca. 3. Hepatomegaly. 4. Contracted gallbladder with adjacent presumed wall thickening versus pericholecystic fluid. No intraluminal dense calcified stone. If there is clinical concern for acute cholecystitis, consider further evaluation with ultrasound.  This wall thickening may be related to underlying passive congestion. 5. Mild degree of stool volume in the colon. 6. Marked enlargement of the left atrium.     XR ABDOMEN (KUB) (SINGLE AP VIEW)    Result Date: 10/3/2024  Prominent lucency in the upper abdomen is  lopressor dose decreased yesterday due to bradycardia.  Unfortunately this morning, she suddenly became tachycardic.  Cardiology notified and EKG being done.  CHF- ascites present on CT scan, lasix had been held due to MARE, nephrology ok with resuming lasix, patient had declined lasix in the past due to frequent urination but understands that it is necessary to avoid fluid overload recurrent hospitalizations  CKD with MARE- stable, per nephrology, we would expect her new baseline to be a few decimal points siria.    Ascites- will need to continue lasix  Gall Bladder edema- most likely due to heart failure, will repeat GB u/s in a few weeks  [facility] of Life [facility]- discussed code status with patient.  She is currently full code but did not align with her wishes.  Will convert to DNR-CC-A.    Medical Decision Making: Medium      Lo Medina MD  10/4/2024  9:31 AM

## 2024-10-04 NOTE — PROGRESS NOTES
Jardiance cost would be $340 for patient. Able to obtain free 14 day supply coupon so patient may discharge. Updated patient. Pt to follow up with Dr. Medina in office 10/8 for further instructions. Gave filled script to patient son.

## 2024-10-04 NOTE — PROGRESS NOTES
Cardiology Progress Note                     Date:   10/4/2024  Patient name: Tracee Riggins  Date of admission:  9/27/2024  4:58 PM  MRN:   [id number]  YOB: 1940  PCP: Beverly Ventura DO    Reason for Admission: CHF, atrial fibrillation with RVR    Subjective:       Pt ST this morning, with complaints of some chest discomfort earlier this morning when her HR was elevated. Pt returned to SB after morning dose of toprol XL, no return of CP/ pressure, denies SOB.     Scheduled Meds:   warfarin  1 mg Oral Once    furosemide  20 mg Oral Daily    metoprolol succinate  12.5 mg Oral Daily    [Held by provider] furosemide  20 mg IntraVENous Daily    levothyroxine  50 mcg Oral Daily    famotidine  20 mg Oral Daily    sodium chloride flush  5-40 mL IntraVENous 2 times per day    empagliflozin  10 mg Oral Daily    warfarin placeholder: dosing by pharmacy   Other RX Placeholder       Continuous Infusions:   sodium chloride         Labs:     CBC:   Recent Labs     10/03/24  0735   WBC 5.0   HGB 12.7   *     BMP:    Recent Labs     10/01/24  1335 10/02/24  0704 10/03/24  0735    141 136   K 4.3 3.9 4.3    103 98   CO2 26 26 25   BUN 72* 74* 78*   CREATININE 2.2* 2.1* 1.9*   GLUCOSE 140* 117* 108*     Hepatic:   No results for input(s): \"AST\", \"ALT\", \"BILITOT\", \"ALKPHOS\" in the last 72 hours.    Invalid input(s): \"ALB\"    Troponin: No results for input(s): \"TROPONINI\" in the last 72 hours.  BNP: No results for input(s): \"BNP\" in the last 72 hours.  Lipids: No results for input(s): \"CHOL\", \"HDL\" in the last 72 hours.    Invalid input(s): \"LDLCALCU\"  INR:   Recent Labs     10/02/24  0704 10/03/24  0735 10/04/24  0737   INR 2.4 3.3 3.0         Objective:     Vitals: BP (!) 136/92   Pulse (!) 111   Temp 97.7 °F (36.5 °C) (Oral)   Resp 14   Ht 1.346 m (4' 5\")   Wt 40.7 kg (89 lb 11.6 oz)   SpO2 96%   BMI 22.46 kg/m²     General appearance: awake, alert, in no apparent

## 2024-10-04 NOTE — PROGRESS NOTES
Updated Dr. Diaz via perfect serve of tachycardia 100-121. PO metoprolol administered. Per Dr. Diaz, obtain 12 lead EKG. Order already in place.    Updated Dr. Medina of this information via perfect serve as well.     EKG: Sinus tachycardia with 2nd degree av block mobitz I, ST and T wave abnormality, consider anterolateral ischemia, abnormal ecg Vent rate 110, pr interval *, qrs duration 86 ms, qt/qtc 354-479 ms, prt axes * 41 164    945: Updated Dr. Medina in person of EKG results.     0959: Updated Dr. Diaz via perfect serve of EKG results.     Per Dr. Medina, monitor and review with cardiology. Per Dr. Diaz, he will take a look.     1003: CNP Jaime at desk. Updated her with EKG results. To review.

## 2024-10-04 NOTE — PLAN OF CARE
Problem: Discharge Planning  Goal: Discharge to home or other facility with appropriate resources  Outcome: Progressing     Problem: Safety - Adult  Goal: Free from fall injury  Outcome: Progressing     Problem: ABCDS Injury Assessment  Goal: Absence of physical injury  Outcome: Progressing     Problem: Respiratory - Adult  Goal: Achieves optimal ventilation and oxygenation  Outcome: Progressing     Problem: Cardiovascular - Adult  Goal: Maintains optimal cardiac output and hemodynamic stability  Outcome: Progressing     Problem: Skin/Tissue Integrity - Adult  Goal: Skin integrity remains intact  Outcome: Progressing     Problem: Metabolic/Fluid and Electrolytes - Adult  Goal: Electrolytes maintained within normal limits  Outcome: Progressing  Goal: Hemodynamic stability and optimal renal function maintained  Outcome: Progressing     Problem: Musculoskeletal - Adult  Goal: Return mobility to safest level of function  Outcome: Progressing     Problem: Chronic Conditions and Co-morbidities  Goal: Patient's chronic conditions and co-morbidity symptoms are monitored and maintained or improved  Outcome: Progressing     Problem: Gastrointestinal - Adult  Goal: Maintains or returns to baseline bowel function  Outcome: Progressing

## 2024-10-07 ENCOUNTER — OFFICE VISIT (OUTPATIENT)
Age: 84
End: 2024-10-07
Payer: MEDICARE

## 2024-10-07 ENCOUNTER — HOSPITAL ENCOUNTER (OUTPATIENT)
Age: 84
Discharge: HOME | End: 2024-10-07
Payer: MEDICARE

## 2024-10-07 VITALS
OXYGEN SATURATION: 90 % | SYSTOLIC BLOOD PRESSURE: 146 MMHG | HEART RATE: 59 BPM | WEIGHT: 91.2 LBS | DIASTOLIC BLOOD PRESSURE: 79 MMHG | BODY MASS INDEX: 22.83 KG/M2

## 2024-10-07 DIAGNOSIS — I50.23 ACUTE ON CHRONIC CLINICAL SYSTOLIC HEART FAILURE (HCC): ICD-10-CM

## 2024-10-07 DIAGNOSIS — I50.22 CHRONIC SYSTOLIC CONGESTIVE HEART FAILURE (HCC): ICD-10-CM

## 2024-10-07 DIAGNOSIS — N17.9 ACUTE KIDNEY INJURY SUPERIMPOSED ON CKD (HCC): ICD-10-CM

## 2024-10-07 DIAGNOSIS — I10 ESSENTIAL HYPERTENSION: ICD-10-CM

## 2024-10-07 DIAGNOSIS — N18.30 STAGE 3 CHRONIC KIDNEY DISEASE, UNSPECIFIED WHETHER STAGE 3A OR 3B CKD (HCC): ICD-10-CM

## 2024-10-07 DIAGNOSIS — I48.0 PAROXYSMAL ATRIAL FIBRILLATION (HCC): Primary | ICD-10-CM

## 2024-10-07 DIAGNOSIS — N18.9 ACUTE KIDNEY INJURY SUPERIMPOSED ON CKD (HCC): ICD-10-CM

## 2024-10-07 DIAGNOSIS — I48.20 CHRONIC ATRIAL FIBRILLATION (HCC): ICD-10-CM

## 2024-10-07 LAB
ANION GAP SERPL CALCULATED.3IONS-SCNC: 12 MMOL/L (ref 9–16)
BUN SERPL-MCNC: 51 MG/DL (ref 8–23)
CALCIUM SERPL-MCNC: 9.2 MG/DL (ref 8.6–10.4)
CHLORIDE SERPL-SCNC: 99 MMOL/L (ref 98–107)
CO2 SERPL-SCNC: 26 MMOL/L (ref 20–31)
CREAT SERPL-MCNC: 1.7 MG/DL (ref 0.5–0.9)
EKG ATRIAL RATE: 163 BPM
EKG ATRIAL RATE: 500 BPM
EKG Q-T INTERVAL: 354 MS
EKG Q-T INTERVAL: 530 MS
EKG QRS DURATION: 86 MS
EKG QRS DURATION: 88 MS
EKG QTC CALCULATION (BAZETT): 473 MS
EKG QTC CALCULATION (BAZETT): 479 MS
EKG R AXIS: 31 DEGREES
EKG R AXIS: 41 DEGREES
EKG T AXIS: 164 DEGREES
EKG T AXIS: 89 DEGREES
EKG VENTRICULAR RATE: 110 BPM
EKG VENTRICULAR RATE: 48 BPM
GFR, ESTIMATED: 30 ML/MIN/1.73M2
GLUCOSE SERPL-MCNC: 71 MG/DL (ref 74–99)
INR PPP: 2.5
POTASSIUM SERPL-SCNC: 4.7 MMOL/L (ref 3.7–5.3)
PROTHROMBIN TIME: 24.7 SEC (ref 9.4–12.6)
SODIUM SERPL-SCNC: 137 MMOL/L (ref 136–145)

## 2024-10-07 PROCEDURE — 99213 OFFICE O/P EST LOW 20 MIN: CPT

## 2024-10-07 PROCEDURE — 3077F SYST BP >= 140 MM HG: CPT

## 2024-10-07 PROCEDURE — 93010 ELECTROCARDIOGRAM REPORT: CPT | Performed by: INTERNAL MEDICINE

## 2024-10-07 PROCEDURE — 3078F DIAST BP <80 MM HG: CPT

## 2024-10-07 PROCEDURE — G8427 DOCREV CUR MEDS BY ELIG CLIN: HCPCS

## 2024-10-07 PROCEDURE — 36415 COLL VENOUS BLD VENIPUNCTURE: CPT

## 2024-10-07 PROCEDURE — 80048 BASIC METABOLIC PNL TOTAL CA: CPT

## 2024-10-07 PROCEDURE — 1090F PRES/ABSN URINE INCON ASSESS: CPT

## 2024-10-07 PROCEDURE — 1036F TOBACCO NON-USER: CPT

## 2024-10-07 PROCEDURE — G8400 PT W/DXA NO RESULTS DOC: HCPCS

## 2024-10-07 PROCEDURE — G8484 FLU IMMUNIZE NO ADMIN: HCPCS

## 2024-10-07 PROCEDURE — 1123F ACP DISCUSS/DSCN MKR DOCD: CPT

## 2024-10-07 PROCEDURE — G8420 CALC BMI NORM PARAMETERS: HCPCS

## 2024-10-07 PROCEDURE — 93000 ELECTROCARDIOGRAM COMPLETE: CPT

## 2024-10-07 PROCEDURE — 85610 PROTHROMBIN TIME: CPT

## 2024-10-07 PROCEDURE — 1111F DSCHRG MED/CURRENT MED MERGE: CPT

## 2024-10-07 ASSESSMENT — ENCOUNTER SYMPTOMS
SHORTNESS OF BREATH: 0
RESPIRATORY NEGATIVE: 1

## 2024-10-07 NOTE — PROGRESS NOTES
disease   Severe tricuspid regurgitation     Plan:  Continue toprolol xl 25mg POD  Continue lasix,  continue daily weights, follow up with nephrology  Pt with concerns of jardiance cost, recommend applying for their financial assistance. Our office is happy to help coordinate if needed.   Continue therapeutic AC with warfarin, INR drawn today    Orders Placed This Encounter    EKG 12 lead     Standing Status:   Future     Number of Occurrences:   1     Standing Expiration Date:   10/7/2025     Order Specific Question:   Reason for Exam?     Answer:   Irregular heart rate        The patient is to continue heart healthy diet, weight loss and exercise as tolerated. Patient's medications and side effects were discussed. Medication refills were provided if needed. Follow up appointment timing was discussed. All questions and concerns were addressed to patient's satisfaction.     Return in about 3 months (around 1/7/2025).    FAM Garcias - CNP    [facility]

## 2024-10-08 ENCOUNTER — OFFICE VISIT (OUTPATIENT)
Age: 84
End: 2024-10-08

## 2024-10-08 VITALS
HEART RATE: 127 BPM | WEIGHT: 90.4 LBS | DIASTOLIC BLOOD PRESSURE: 82 MMHG | OXYGEN SATURATION: 99 % | TEMPERATURE: 97.8 F | BODY MASS INDEX: 20.92 KG/M2 | SYSTOLIC BLOOD PRESSURE: 130 MMHG | HEIGHT: 55 IN

## 2024-10-08 DIAGNOSIS — E03.9 HYPOTHYROIDISM, UNSPECIFIED TYPE: ICD-10-CM

## 2024-10-08 DIAGNOSIS — N18.32 STAGE 3B CHRONIC KIDNEY DISEASE (HCC): ICD-10-CM

## 2024-10-08 DIAGNOSIS — I48.20 CHRONIC ATRIAL FIBRILLATION (HCC): ICD-10-CM

## 2024-10-08 DIAGNOSIS — Z09 HOSPITAL DISCHARGE FOLLOW-UP: Primary | ICD-10-CM

## 2024-10-08 SDOH — ECONOMIC STABILITY: FOOD INSECURITY: WITHIN THE PAST 12 MONTHS, YOU WORRIED THAT YOUR FOOD WOULD RUN OUT BEFORE YOU GOT MONEY TO BUY MORE.: NEVER TRUE

## 2024-10-08 SDOH — ECONOMIC STABILITY: FOOD INSECURITY: WITHIN THE PAST 12 MONTHS, THE FOOD YOU BOUGHT JUST DIDN'T LAST AND YOU DIDN'T HAVE MONEY TO GET MORE.: NEVER TRUE

## 2024-10-08 SDOH — ECONOMIC STABILITY: INCOME INSECURITY: HOW HARD IS IT FOR YOU TO PAY FOR THE VERY BASICS LIKE FOOD, HOUSING, MEDICAL CARE, AND HEATING?: NOT HARD AT ALL

## 2024-10-08 NOTE — PROGRESS NOTES
Post-Discharge Transitional Care  Follow Up      Tracee Riggins   YOB: 1940    Date of Office Visit:  10/8/2024  Date of Hospital Admission: 9/27/24  Date of Hospital Discharge: 10/4/24  Risk of hospital readmission (high >=14%. Medium >=10%) :Readmission Risk Score: 18.3      Care management risk score Rising risk (score 2-5) and Complex Care (Scores >=6): No Risk Score On File     Non face to face  following discharge, date last encounter closed (first attempt may have been earlier): 10/04/2024    Call initiated 2 business days of discharge: Yes    ASSESSMENT/PLAN:   Hospital discharge follow-up  -     WI DISCHARGE MEDS RECONCILED W/ CURRENT OUTPATIENT MED LIST  -     empagliflozin (JARDIANCE) 10 MG tablet; Take 1 tablet by mouth daily, Disp-30 tablet, R-2Normal  Hypothyroidism, unspecified type  -     TSH with Reflex; Future  Chronic atrial fibrillation (HCC)  Stage 3b chronic kidney disease (HCC)    Medical Decision Making: high complexity  No follow-ups on file.           Subjective:   HPI:  Follow up of Hospital problems/diagnosis(es): CHF    A.fib    CKD stage 3b    New onset hypothyroidism    Per hospital consult:   83-year-old female with known history of paroxysmal atrial fibrillation on chronic warfarin, history of bioprosthetic mitral valve replacement in 1986 and later in 2009, recently admitted for episode of dizziness when she was noted to be in atrial fibrillation, was started on IV amiodarone which she did not tolerate well with episode of hypotension and rash, discharged home on metoprolol with rate control, return to ER with shortness of breath and palpitations, found to be in atrial fibrillation with RVR along with volume overload, started on IV Lasix 40 mg twice daily, also started on IV Cardizem with good heart rate control, Cardizem is now off, this morning she is feeling improved with improved dyspnea and heart rate of 80-90, she remains in atrial fibrillation, denies any

## 2024-10-08 NOTE — DISCHARGE SUMMARY
[facility]  IN-PATIENT SERVICE   [facility]    Discharge Summary     Patient ID: Tracee Riggins  :  1940   MRN: [id number]     ACCOUNT:  [id number]   Patient's PCP: Beverly Ventura DO  Admit Date: 2024   Discharge Date: 10/7/2024  Length of Stay: 7  Code Status:  Prior  Admitting Physician: Torrey Horner MD  Discharge Physician: Lo Medina MD     Active Discharge Diagnoses:     Hospital Problem Lists:  Principal Problem:    Acute on chronic clinical systolic heart failure (HCC)  Active Problems:    Paroxysmal atrial fibrillation (HCC)    Moderate pulmonary hypertension (HCC)    CKD (chronic kidney disease), stage III (HCC)    Essential hypertension    S/P mitral valve replacement with bioprosthetic valve    Acute kidney injury superimposed on CKD (HCC)    Chronic atrial fibrillation (HCC)  Resolved Problems:    * No resolved hospital problems. *      Admission Condition:  fair     Discharged Condition: good    Hospital Stay:     Hospital Course:  Tracee Riggins is a 83 y.o. female who was admitted for the management of Acute on chronic clinical systolic heart failure (HCC) , presented to ER with Shortness of Breath (Shortness of breath worsening today - NL SOB; no chest pain.  Leg swelling comes and goes. )    1 month ago patient was pulling weeds in her garden when she developed severe shortness of breath and chest tightness. She was hospitalized and had a cardiac workup where she was found to have reduced EF. Recently patient was seen by her PCP and started on Lasix 20 mg 3 times weekly but she often skips due to excess urination. She reports that over the past 5 days she has noted peripheral edema and today she had generalized fatigue and exertional dyspnea. She reported to the emergency department where she is found to have pulmonary edema with small effusions and elevated BNP consistent with acute on chronic systolic heart failure.

## 2024-10-08 NOTE — PATIENT INSTRUCTIONS
Tracee    Thank you for choosing [facility].  We know you have options when it comes to your healthcare; we appreciate that you chose us. Our goal is to provide exceptional  service and world class care to every patient.  You will be receiving a survey via email or text message asking for your feedback.  Please take a few minutes to share your thoughts about your recent visit. Your comments help us understand what we do well and ways we can improve.  Thank you in advance for your valuable feedback.      Dr. MANUEL Penn

## 2024-10-17 ENCOUNTER — HOSPITAL ENCOUNTER (OUTPATIENT)
Age: 84
Setting detail: SPECIMEN
Discharge: HOME | End: 2024-10-17

## 2024-10-17 DIAGNOSIS — I50.23 ACUTE ON CHRONIC CLINICAL SYSTOLIC HEART FAILURE (HCC): ICD-10-CM

## 2024-10-17 LAB
ANION GAP SERPL CALCULATED.3IONS-SCNC: 10 MMOL/L (ref 9–16)
BUN SERPL-MCNC: 43 MG/DL (ref 8–23)
CALCIUM SERPL-MCNC: 9.6 MG/DL (ref 8.6–10.4)
CHLORIDE SERPL-SCNC: 103 MMOL/L (ref 98–107)
CO2 SERPL-SCNC: 26 MMOL/L (ref 20–31)
CREAT SERPL-MCNC: 1.5 MG/DL (ref 0.5–0.9)
GFR, ESTIMATED: 35 ML/MIN/1.73M2
GLUCOSE SERPL-MCNC: 80 MG/DL (ref 74–99)
POTASSIUM SERPL-SCNC: 5.1 MMOL/L (ref 3.7–5.3)
SODIUM SERPL-SCNC: 139 MMOL/L (ref 136–145)

## 2024-11-05 ENCOUNTER — HOSPITAL ENCOUNTER (OUTPATIENT)
Age: 84
Setting detail: SPECIMEN
Discharge: HOME | End: 2024-11-05

## 2024-11-05 DIAGNOSIS — I27.20 PULMONARY HYPERTENSION (HCC): ICD-10-CM

## 2024-11-05 DIAGNOSIS — R60.0 BILATERAL LOWER EXTREMITY EDEMA: ICD-10-CM

## 2024-11-05 DIAGNOSIS — I10 ESSENTIAL HYPERTENSION: ICD-10-CM

## 2024-11-05 DIAGNOSIS — N18.31 STAGE 3A CHRONIC KIDNEY DISEASE (HCC): ICD-10-CM

## 2024-11-05 DIAGNOSIS — I48.0 PAROXYSMAL ATRIAL FIBRILLATION (HCC): ICD-10-CM

## 2024-11-05 LAB
ANION GAP SERPL CALCULATED.3IONS-SCNC: 8 MMOL/L (ref 9–16)
BASOPHILS # BLD: 0.05 K/UL (ref 0–0.2)
BASOPHILS NFR BLD: 1 % (ref 0–2)
BUN SERPL-MCNC: 40 MG/DL (ref 8–23)
CALCIUM SERPL-MCNC: 9.2 MG/DL (ref 8.6–10.4)
CHLORIDE SERPL-SCNC: 106 MMOL/L (ref 98–107)
CO2 SERPL-SCNC: 27 MMOL/L (ref 20–31)
CREAT SERPL-MCNC: 1.3 MG/DL (ref 0.6–0.9)
CREAT UR-MCNC: 51.3 MG/DL (ref 28–217)
EOSINOPHIL # BLD: 0.09 K/UL (ref 0–0.44)
EOSINOPHILS RELATIVE PERCENT: 2 % (ref 1–4)
ERYTHROCYTE [DISTWIDTH] IN BLOOD BY AUTOMATED COUNT: 15.8 % (ref 11.8–14.4)
GFR, ESTIMATED: 40 ML/MIN/1.73M2
GLUCOSE SERPL-MCNC: 80 MG/DL (ref 74–99)
HCT VFR BLD AUTO: 44.9 % (ref 36.3–47.1)
HGB BLD-MCNC: 13.7 G/DL (ref 11.9–15.1)
IMM GRANULOCYTES # BLD AUTO: <0.03 K/UL (ref 0–0.3)
IMM GRANULOCYTES NFR BLD: 0 %
LYMPHOCYTES NFR BLD: 1.43 K/UL (ref 1.1–3.7)
LYMPHOCYTES RELATIVE PERCENT: 31 % (ref 24–43)
MAGNESIUM SERPL-MCNC: 2.5 MG/DL (ref 1.6–2.4)
MCH RBC QN AUTO: 28.2 PG (ref 25.2–33.5)
MCHC RBC AUTO-ENTMCNC: 30.5 G/DL (ref 28.4–34.8)
MCV RBC AUTO: 92.6 FL (ref 82.6–102.9)
MONOCYTES NFR BLD: 0.43 K/UL (ref 0.1–1.2)
MONOCYTES NFR BLD: 9 % (ref 3–12)
NEUTROPHILS NFR BLD: 57 % (ref 36–65)
NEUTS SEG NFR BLD: 2.55 K/UL (ref 1.5–8.1)
NRBC BLD-RTO: 0 PER 100 WBC
PLATELET # BLD AUTO: 144 K/UL (ref 138–453)
PMV BLD AUTO: 12.5 FL (ref 8.1–13.5)
POTASSIUM SERPL-SCNC: 5.4 MMOL/L (ref 3.7–5.3)
RBC # BLD AUTO: 4.85 M/UL (ref 3.95–5.11)
RBC # BLD: ABNORMAL 10*6/UL
SODIUM SERPL-SCNC: 141 MMOL/L (ref 136–145)
TOTAL PROTEIN, URINE: 9 MG/DL
WBC OTHER # BLD: 4.6 K/UL (ref 3.5–11.3)

## 2024-11-08 ENCOUNTER — HOSPITAL ENCOUNTER (OUTPATIENT)
Age: 84
Setting detail: SPECIMEN
Discharge: HOME | End: 2024-11-08

## 2024-11-08 DIAGNOSIS — R80.9 ALBUMINURIA: ICD-10-CM

## 2024-11-08 DIAGNOSIS — R60.0 BILATERAL LOWER EXTREMITY EDEMA: ICD-10-CM

## 2024-11-08 DIAGNOSIS — I27.20 PULMONARY HYPERTENSION (HCC): ICD-10-CM

## 2024-11-08 DIAGNOSIS — E87.5 HYPERKALEMIA: ICD-10-CM

## 2024-11-08 DIAGNOSIS — I48.0 PAROXYSMAL ATRIAL FIBRILLATION (HCC): ICD-10-CM

## 2024-11-08 DIAGNOSIS — N28.1 RENAL CYST: ICD-10-CM

## 2024-11-08 DIAGNOSIS — N18.31 STAGE 3A CHRONIC KIDNEY DISEASE (HCC): ICD-10-CM

## 2024-11-08 LAB
ANION GAP SERPL CALCULATED.3IONS-SCNC: 10 MMOL/L (ref 9–16)
BUN SERPL-MCNC: 33 MG/DL (ref 8–23)
CALCIUM SERPL-MCNC: 9.5 MG/DL (ref 8.6–10.4)
CHLORIDE SERPL-SCNC: 105 MMOL/L (ref 98–107)
CO2 SERPL-SCNC: 28 MMOL/L (ref 20–31)
CREAT SERPL-MCNC: 1.2 MG/DL (ref 0.6–0.9)
GFR, ESTIMATED: 44 ML/MIN/1.73M2
GLUCOSE SERPL-MCNC: 80 MG/DL (ref 74–99)
POTASSIUM SERPL-SCNC: 5.1 MMOL/L (ref 3.7–5.3)
SODIUM SERPL-SCNC: 143 MMOL/L (ref 136–145)

## 2024-11-21 ENCOUNTER — OFFICE VISIT (OUTPATIENT)
Age: 84
End: 2024-11-21
Payer: MEDICARE

## 2024-11-21 VITALS
DIASTOLIC BLOOD PRESSURE: 76 MMHG | SYSTOLIC BLOOD PRESSURE: 136 MMHG | HEIGHT: 55 IN | WEIGHT: 86 LBS | TEMPERATURE: 98 F | BODY MASS INDEX: 19.9 KG/M2

## 2024-11-21 DIAGNOSIS — K55.1 SUPERIOR MESENTERIC ARTERY SYNDROME (HCC): ICD-10-CM

## 2024-11-21 DIAGNOSIS — I48.20 CHRONIC ATRIAL FIBRILLATION (HCC): Primary | ICD-10-CM

## 2024-11-21 DIAGNOSIS — Z79.01 LONG TERM (CURRENT) USE OF ANTICOAGULANTS: ICD-10-CM

## 2024-11-21 DIAGNOSIS — Z95.3 S/P MITRAL VALVE REPLACEMENT WITH BIOPROSTHETIC VALVE: ICD-10-CM

## 2024-11-21 DIAGNOSIS — E03.9 ACQUIRED HYPOTHYROIDISM: ICD-10-CM

## 2024-11-21 DIAGNOSIS — I50.22 CHRONIC SYSTOLIC CONGESTIVE HEART FAILURE (HCC): ICD-10-CM

## 2024-11-21 DIAGNOSIS — N18.32 STAGE 3B CHRONIC KIDNEY DISEASE (HCC): ICD-10-CM

## 2024-11-21 PROCEDURE — 1123F ACP DISCUSS/DSCN MKR DOCD: CPT | Performed by: FAMILY MEDICINE

## 2024-11-21 PROCEDURE — G8427 DOCREV CUR MEDS BY ELIG CLIN: HCPCS | Performed by: FAMILY MEDICINE

## 2024-11-21 PROCEDURE — 1090F PRES/ABSN URINE INCON ASSESS: CPT | Performed by: FAMILY MEDICINE

## 2024-11-21 PROCEDURE — G8420 CALC BMI NORM PARAMETERS: HCPCS | Performed by: FAMILY MEDICINE

## 2024-11-21 PROCEDURE — 1036F TOBACCO NON-USER: CPT | Performed by: FAMILY MEDICINE

## 2024-11-21 PROCEDURE — 99214 OFFICE O/P EST MOD 30 MIN: CPT | Performed by: FAMILY MEDICINE

## 2024-11-21 PROCEDURE — 1159F MED LIST DOCD IN RCRD: CPT | Performed by: FAMILY MEDICINE

## 2024-11-21 PROCEDURE — G8484 FLU IMMUNIZE NO ADMIN: HCPCS | Performed by: FAMILY MEDICINE

## 2024-11-21 NOTE — PROGRESS NOTES
anticoagulants    Right upper quadrant pain    Abdominal pain    Back pain    CHF (congestive heart failure) (HCC)    Heart valve disorder    Palpitations    Renal cyst    S/P mitral valve replacement with bioprosthetic valve    Tachycardia    Gout    Lumbar disc disease    Vitamin D deficiency    Thrombocytopenia (HCC)    Acute on chronic clinical systolic heart failure (HCC)    Acute kidney injury superimposed on CKD (HCC)    Chronic atrial fibrillation (HCC)       Past Medical History:   Diagnosis Date    Chronic kidney disease     Gout 12/2019    Gout 05/2020    Hypertension     Lumbar disc disease     Thrombocytopenia (HCC)     Vitamin D deficiency        Past Surgical History:   Procedure Laterality Date    CABG WITH MITRAL VALVE REPLACEMENT      CATARACT REMOVAL          Social History     Socioeconomic History    Marital status:    Tobacco Use    Smoking status: Never    Smokeless tobacco: Never   Vaping Use    Vaping status: Never Used   Substance and Sexual Activity    Alcohol use: No    Drug use: No    Sexual activity: Defer     Social Determinants of Health     Financial Resource Strain: Low Risk  (10/8/2024)    Overall Financial Resource Strain (CARDIA)     Difficulty of Paying Living Expenses: Not hard at all   Food Insecurity: No Food Insecurity (10/8/2024)    Hunger Vital Sign     Worried About Running Out of Food in the Last Year: Never true     Ran Out of Food in the Last Year: Never true   Transportation Needs: No Transportation Needs (10/8/2024)    PRAPARE - Transportation     Lack of Transportation (Medical): No     Lack of Transportation (Non-Medical): No   Physical Activity: Sufficiently Active (3/28/2024)    Exercise Vital Sign     Days of Exercise per Week: 5 days     Minutes of Exercise per Session: 30 min    Received from [facility]    [place]   Housing Stability: Low Risk  (10/8/2024)    Housing Stability Vital Sign     Unable

## 2024-11-25 ENCOUNTER — ANTI-COAG VISIT (OUTPATIENT)
Age: 84
End: 2024-11-25
Payer: MEDICARE

## 2024-11-25 DIAGNOSIS — I48.0 PAROXYSMAL ATRIAL FIBRILLATION (HCC): ICD-10-CM

## 2024-11-25 DIAGNOSIS — Z95.3 S/P MITRAL VALVE REPLACEMENT WITH BIOPROSTHETIC VALVE: Primary | ICD-10-CM

## 2024-11-25 LAB
INTERNATIONAL NORMALIZATION RATIO, POC: 1.2
PROTHROMBIN TIME, POC: 14.5

## 2024-11-25 PROCEDURE — 85610 PROTHROMBIN TIME: CPT

## 2024-11-25 PROCEDURE — 99213 OFFICE O/P EST LOW 20 MIN: CPT

## 2024-11-25 NOTE — PROGRESS NOTES
Medication Management Service, Warfarin Management  [facility] Medication Management, [phone]  Visit Date: 11/25/2024   Subjective:   Tracee Riggins is a 84 y.o. female who presents to clinic today for anticoagulation monitoring and adjustment. Today is patient's first visit to the medication management clinic for warfarin management per referral from Dr. Ventura, her PCP. Patient previously had warfarin managed by [facility] anticoagulation clinic. INR last checked 10/7, INR was 2.5 at that time. Patient estimates she has been on warfarin since about 2009. Per [facility] anticoagulation clinic, patient had been previously following with them for atrial fibrillation and INR goal 2-3. Their last maintenance dose for her was 2 mg daily five days of the week and 1 mg daily the other two days of the week. However patient states since she was discharged from the hospital most recently she has only been taking warfarin 1 mg daily.    Patient was referred for warfarin management due to  Indication:    S/P mitral valve replacement with bioprosthetic valve .   INR goal: of 2.5-3.5.  Duration of therapy: indefinite.    Patient reports the following:   Bleeding or thromboembolic side effects:  None    Significant medication, dietary, alcohol, or tobacco changes:   ensured medication list is up to date, reviewed warfarin counseling    Significant recent illness, disease state changes, or hospitalization:  None  Upcoming surgeries or procedures:  None           Assessment and PLAN   PT/INR done in office per protocol.     INR today is 1.2, subtherapeutic, likely due to patient only currently taking 1 mg daily which is much lower than previous maintenance doses she has required.    Plan:  Take warfarin 2 mg daily until next INR check. Suspect patient's maintenance dose may include some 1 mg doses but with low INR today will give increased dose at least temporarily.  Using warfarin 1 mg tablets.    Will also reach out to

## 2024-11-26 NOTE — PROGRESS NOTES
[facility] Medication Management Clinic Note  Received clarification from Dr. Ventura:  She has a Mitral Valve: Bovine bioprosthetic valve   So on coumadin for a fib- so INR 2-3.    Will proceed with INR goal 2-3 at this time, this does not change plan for warfarin dose for this week.    Ashley Correa, PharmD  [facility]  11/26/2024 8:57 AM

## 2024-11-29 ENCOUNTER — ANTI-COAG VISIT (OUTPATIENT)
Age: 84
End: 2024-11-29
Payer: MEDICARE

## 2024-11-29 DIAGNOSIS — Z95.3 S/P MITRAL VALVE REPLACEMENT WITH BIOPROSTHETIC VALVE: ICD-10-CM

## 2024-11-29 DIAGNOSIS — I48.0 PAROXYSMAL ATRIAL FIBRILLATION (HCC): Primary | ICD-10-CM

## 2024-11-29 LAB
INTERNATIONAL NORMALIZATION RATIO, POC: 1.5
PROTHROMBIN TIME, POC: 18.2

## 2024-11-29 PROCEDURE — 99212 OFFICE O/P EST SF 10 MIN: CPT

## 2024-11-29 PROCEDURE — 85610 PROTHROMBIN TIME: CPT

## 2024-11-29 NOTE — PROGRESS NOTES
Medication Management Service, Warfarin Management  [facility] Medication Management, [phone]  Visit Date: 11/29/2024   Subjective:   Tracee Riggins is a 84 y.o. female who presents to clinic today for anticoagulation monitoring and adjustment.    Patient was referred for warfarin management due to  Indication:    S/P mitral valve replacement with bioprosthetic valve, atrial fibrillation   INR goal: of 2-3 - clarified with Dr. Ventura 11/26/24  Duration of therapy: indefinite.    Patient reports the following:   Adherent with regimen:  Yes  Missed or extra doses:  None   Bleeding or thromboembolic side effects:  None  Significant medication, dietary, alcohol, or tobacco changes:  None  Significant recent illness, disease state changes, or hospitalization:  None  Upcoming surgeries or procedures:  None           Assessment and PLAN   PT/INR done in office per protocol.     INR today is 1.5, subtherapeutic but increased since initial visit    Plan:  Follow dose of warfarin 1 mg on Wednesday and 2 mg all other days until next INR check. Based on patient's most recent dosing from [facility], suspect patient may need 1-3 days per week where she takes 1 mg instead of 2 mg, however since she had only been taking 1 mg daily prior to initial visit with us earlier in the week we are using a higher dose to get back into therapeutic range. Will monitor more frequently initially.  Using warfarin 1 mg tablets.    Recheck INR in 1 week(s).     Patient verbalized understanding of dosing directions and information discussed. Dosing schedule given to patient. Progress note sent to referring office.  Patient acknowledges working in consult agreement with pharmacist as referred by his/her physician.      Electronically signed by Ashley Wallis RPH on 11/29/24 at 8:00 AM EST    For Pharmacy Admin Tracking Only    Intervention Detail: Dose Adjustment: 1, reason: Therapy Optimization  Total # of Interventions

## 2024-12-06 ENCOUNTER — ANTI-COAG VISIT (OUTPATIENT)
Age: 84
End: 2024-12-06
Payer: MEDICARE

## 2024-12-06 DIAGNOSIS — Z95.3 S/P MITRAL VALVE REPLACEMENT WITH BIOPROSTHETIC VALVE: ICD-10-CM

## 2024-12-06 DIAGNOSIS — I48.0 PAROXYSMAL ATRIAL FIBRILLATION (HCC): Primary | ICD-10-CM

## 2024-12-06 LAB
INTERNATIONAL NORMALIZATION RATIO, POC: 2
PROTHROMBIN TIME, POC: 24.3

## 2024-12-06 PROCEDURE — 85610 PROTHROMBIN TIME: CPT

## 2024-12-06 PROCEDURE — 99211 OFF/OP EST MAY X REQ PHY/QHP: CPT

## 2024-12-06 NOTE — PROGRESS NOTES
Medication Management Service, Warfarin Management  [facility] Medication Management, [phone]  Visit Date: 12/6/24  Subjective:   Tracee Riggins is a 84 y.o. female who presents to clinic today for anticoagulation monitoring and adjustment.     Patient was referred for warfarin management due to  Indication:    S/P mitral valve replacement with bioprosthetic valve, atrial fibrillation   INR goal: of 2-3 - clarified with Dr. Ventura 11/26/24  Duration of therapy: indefinite.     Patient reports the following:   Adherent with regimen:  Yes  Missed or extra doses:  None   Bleeding or thromboembolic side effects:  None  Significant medication, dietary, alcohol, or tobacco changes:  None  Significant recent illness, disease state changes, or hospitalization:  None  Upcoming surgeries or procedures:  None           Assessment and PLAN   PT/INR done in office per protocol.     INR today is 2.0, therapeutic      Plan:  Continue warfarin 1 mg on Wednesday and 2 mg all other days until next INR check. INR sagrario nicely since last visit 11/29 post-dose adjustment. Will check again in 2 weeks to see how much further it rises (if any). If therapeutic, will extend interval between visits.     Using warfarin 1 mg tablets.     Recheck INR in 2 week(s).      Patient verbalized understanding of dosing directions and information discussed. Dosing schedule given to patient. Progress note sent to referring office.  Patient acknowledges working in consult agreement with pharmacist as referred by his/her physician.       Electronically signed by Orlando Gary 12/6/24 12:18p     For Pharmacy Admin Tracking Only     Intervention Detail: Dose Adjustment: 0   Total # of Interventions Recommended: 0  Total # of Interventions Accepted: 0  Time Spent (min): 20

## 2024-12-20 ENCOUNTER — ANTI-COAG VISIT (OUTPATIENT)
Age: 84
End: 2024-12-20
Payer: MEDICARE

## 2024-12-20 DIAGNOSIS — I48.0 PAROXYSMAL ATRIAL FIBRILLATION (HCC): Primary | ICD-10-CM

## 2024-12-20 DIAGNOSIS — Z95.3 S/P MITRAL VALVE REPLACEMENT WITH BIOPROSTHETIC VALVE: ICD-10-CM

## 2024-12-20 LAB
INTERNATIONAL NORMALIZATION RATIO, POC: 2.6
PROTHROMBIN TIME, POC: 31.3

## 2024-12-20 PROCEDURE — 85610 PROTHROMBIN TIME: CPT

## 2024-12-20 PROCEDURE — 99211 OFF/OP EST MAY X REQ PHY/QHP: CPT

## 2024-12-20 NOTE — PROGRESS NOTES
Medication Management Service, Warfarin Management  [facility] Medication Management, [phone]  Visit Date: 12/20/24  Subjective:   Tracee Riggins is a 84 y.o. female who presents to clinic today for anticoagulation monitoring and adjustment.     Patient was referred for warfarin management due to  Indication:    S/P mitral valve replacement with bioprosthetic valve, atrial fibrillation   INR goal: of 2-3 - clarified with Dr. Ventura 11/26/24  Duration of therapy: indefinite.     Patient reports the following:   Adherent with regimen:  Yes  Missed or extra doses:  None   Bleeding or thromboembolic side effects:  None  Significant medication, dietary, alcohol, or tobacco changes:  None  Significant recent illness, disease state changes, or hospitalization:  None  Upcoming surgeries or procedures:  None           Assessment and PLAN   PT/INR done in office per protocol.     INR today is 2.6, therapeutic      Plan:  Continue warfarin 1 mg on Wednesday and 2 mg all other days until next INR check.      Using warfarin 1 mg tablets.     Recheck INR in 4 week(s).      Patient verbalized understanding of dosing directions and information discussed. Dosing schedule given to patient. Progress note sent to referring office.  Patient acknowledges working in consult agreement with pharmacist as referred by his/her physician.       Electronically signed by Orlando Gary 12/20/24 11:02a     For Pharmacy Admin Tracking Only     Intervention Detail: Dose Adjustment: 0   Total # of Interventions Recommended: 0  Total # of Interventions Accepted: 0  Time Spent (min): 20

## 2025-01-07 ENCOUNTER — OFFICE VISIT (OUTPATIENT)
Age: 85
End: 2025-01-07
Payer: MEDICARE

## 2025-01-07 VITALS
DIASTOLIC BLOOD PRESSURE: 68 MMHG | HEART RATE: 73 BPM | WEIGHT: 87.2 LBS | SYSTOLIC BLOOD PRESSURE: 138 MMHG | BODY MASS INDEX: 21.83 KG/M2 | OXYGEN SATURATION: 97 %

## 2025-01-07 DIAGNOSIS — Z95.3 S/P MITRAL VALVE REPLACEMENT WITH BIOPROSTHETIC VALVE: ICD-10-CM

## 2025-01-07 DIAGNOSIS — I50.22 CHRONIC SYSTOLIC CONGESTIVE HEART FAILURE (HCC): ICD-10-CM

## 2025-01-07 DIAGNOSIS — N18.30 STAGE 3 CHRONIC KIDNEY DISEASE, UNSPECIFIED WHETHER STAGE 3A OR 3B CKD (HCC): ICD-10-CM

## 2025-01-07 DIAGNOSIS — I50.23 ACUTE ON CHRONIC CLINICAL SYSTOLIC HEART FAILURE (HCC): ICD-10-CM

## 2025-01-07 DIAGNOSIS — I10 ESSENTIAL HYPERTENSION: ICD-10-CM

## 2025-01-07 DIAGNOSIS — I27.20 MODERATE PULMONARY HYPERTENSION (HCC): ICD-10-CM

## 2025-01-07 DIAGNOSIS — I48.0 PAROXYSMAL ATRIAL FIBRILLATION (HCC): Primary | ICD-10-CM

## 2025-01-07 PROCEDURE — 1123F ACP DISCUSS/DSCN MKR DOCD: CPT

## 2025-01-07 PROCEDURE — 99214 OFFICE O/P EST MOD 30 MIN: CPT

## 2025-01-07 PROCEDURE — 1159F MED LIST DOCD IN RCRD: CPT

## 2025-01-07 PROCEDURE — G8420 CALC BMI NORM PARAMETERS: HCPCS

## 2025-01-07 PROCEDURE — 1090F PRES/ABSN URINE INCON ASSESS: CPT

## 2025-01-07 PROCEDURE — G8427 DOCREV CUR MEDS BY ELIG CLIN: HCPCS

## 2025-01-07 PROCEDURE — 1036F TOBACCO NON-USER: CPT

## 2025-01-07 PROCEDURE — 93000 ELECTROCARDIOGRAM COMPLETE: CPT

## 2025-01-07 PROCEDURE — M1308 PR FLU IMMUNIZE NO ADMIN: HCPCS

## 2025-01-07 ASSESSMENT — ENCOUNTER SYMPTOMS
CHEST TIGHTNESS: 0
SHORTNESS OF BREATH: 0

## 2025-01-07 NOTE — PROGRESS NOTES
Sufficiently Active (3/28/2024)    Exercise Vital Sign     Days of Exercise per Week: 5 days     Minutes of Exercise per Session: 30 min   Stress: Not on file   Social Connections: Not on file   Intimate Partner Violence: Unknown (2/22/2024)    Received from [facility]    [place]     Fear of Current or Ex-Partner: Not on file     Emotionally Abused: Not on file     Physically Abused: Not on file     Sexually Abused: Not on file     Physically or Sexually Abused: Not on file   Housing Stability: Low Risk  (10/8/2024)    Housing Stability Vital Sign     Unable to Pay for Housing in the Last Year: No     Number of Times Moved in the Last Year: 1     Homeless in the Last Year: No        REVIEW OF SYSTEMS:    Review of Systems   Constitutional:  Negative for activity change and fatigue.   Respiratory:  Negative for chest tightness and shortness of breath.    Cardiovascular:  Negative for chest pain, palpitations and leg swelling.   Musculoskeletal: Negative.    Skin: Negative.    Neurological: Negative.    Psychiatric/Behavioral: Negative.         Medications:    Current Outpatient Medications:     furosemide (LASIX) 20 MG tablet, Take 1 tablet by mouth three times a week, Disp: 12 tablet, Rfl: 4    empagliflozin (JARDIANCE) 10 MG tablet, Take 1 tablet by mouth daily, Disp: 30 tablet, Rfl: 2    metoprolol succinate (TOPROL XL) 25 MG extended release tablet, Take 0.5 tablets by mouth daily, Disp: 30 tablet, Rfl: 3    levothyroxine (SYNTHROID) 50 MCG tablet, Take 1 tablet by mouth Daily, Disp: 30 tablet, Rfl: 3    warfarin (COUMADIN) 1 MG tablet, Take 2 tablets by mouth daily, Disp: , Rfl: 3     Physical Exam:   Vitals: /68 (Site: Left Upper Arm, Position: Sitting, Cuff Size: Medium Adult)   Pulse 73   Wt 39.6 kg (87 lb 3.2 oz)   SpO2 97%   BMI 21.83 kg/m²     Physical Exam  Cardiovascular:      Rate and Rhythm: Normal rate and regular rhythm.      Heart

## 2025-01-17 ENCOUNTER — ANTI-COAG VISIT (OUTPATIENT)
Age: 85
End: 2025-01-17
Payer: MEDICARE

## 2025-01-17 DIAGNOSIS — I48.0 PAROXYSMAL ATRIAL FIBRILLATION (HCC): Primary | ICD-10-CM

## 2025-01-17 DIAGNOSIS — Z95.3 S/P MITRAL VALVE REPLACEMENT WITH BIOPROSTHETIC VALVE: ICD-10-CM

## 2025-01-17 LAB
INTERNATIONAL NORMALIZATION RATIO, POC: 2.4
PROTHROMBIN TIME, POC: 29

## 2025-01-17 PROCEDURE — 99211 OFF/OP EST MAY X REQ PHY/QHP: CPT

## 2025-01-17 PROCEDURE — 85610 PROTHROMBIN TIME: CPT

## 2025-01-17 NOTE — PROGRESS NOTES
Medication Management Service, Warfarin Management  [facility] Medication Management, [phone]  Visit Date: 1/17/25  Subjective:   Tracee Riggins is a 84 y.o. female who presents to clinic today for anticoagulation monitoring and adjustment.     Patient was referred for warfarin management due to  Indication:    S/P mitral valve replacement with bioprosthetic valve, atrial fibrillation   INR goal: of 2-3 - clarified with Dr. Ventura 11/26/24  Duration of therapy: indefinite.     Patient reports the following:   Adherent with regimen:  Yes  Missed or extra doses:  None   Bleeding or thromboembolic side effects:  None  Significant medication, dietary, alcohol, or tobacco changes:  None  Significant recent illness, disease state changes, or hospitalization:  Patient was concerned about nosebleed this morning  Upcoming surgeries or procedures:  None           Assessment and PLAN   PT/INR done in office per protocol.     INR today is 2.4, therapeutic      Plan:  Continue warfarin 1 mg on Wednesday and 2 mg all other days until next INR check.      Using warfarin 1 mg tablets.     Recheck INR in 4 week(s).      Patient verbalized understanding of dosing directions and information discussed. Dosing schedule given to patient. Progress note sent to referring office.  Patient acknowledges working in consult agreement with pharmacist as referred by his/her physician.       Electronically signed by Orlando Gary 1/17/25 11:18a     For Pharmacy Admin Tracking Only     Intervention Detail: Dose Adjustment: 0   Total # of Interventions Recommended: 0  Total # of Interventions Accepted: 0  Time Spent (min): 20

## 2025-02-14 ENCOUNTER — ANTI-COAG VISIT (OUTPATIENT)
Age: 85
End: 2025-02-14
Payer: MEDICARE

## 2025-02-14 DIAGNOSIS — Z95.3 S/P MITRAL VALVE REPLACEMENT WITH BIOPROSTHETIC VALVE: ICD-10-CM

## 2025-02-14 DIAGNOSIS — I48.0 PAROXYSMAL ATRIAL FIBRILLATION (HCC): Primary | ICD-10-CM

## 2025-02-14 LAB
INTERNATIONAL NORMALIZATION RATIO, POC: 3
PROTHROMBIN TIME, POC: 36.1

## 2025-02-14 PROCEDURE — 85610 PROTHROMBIN TIME: CPT

## 2025-02-14 PROCEDURE — 99211 OFF/OP EST MAY X REQ PHY/QHP: CPT

## 2025-02-18 ENCOUNTER — OFFICE VISIT (OUTPATIENT)
Age: 85
End: 2025-02-18

## 2025-02-18 VITALS
HEART RATE: 72 BPM | WEIGHT: 92.2 LBS | OXYGEN SATURATION: 99 % | DIASTOLIC BLOOD PRESSURE: 77 MMHG | BODY MASS INDEX: 23.08 KG/M2 | SYSTOLIC BLOOD PRESSURE: 156 MMHG

## 2025-02-18 DIAGNOSIS — Z09 HOSPITAL DISCHARGE FOLLOW-UP: ICD-10-CM

## 2025-02-18 RX ORDER — METOPROLOL SUCCINATE 25 MG/1
12.5 TABLET, EXTENDED RELEASE ORAL DAILY
Qty: 30 TABLET | Refills: 5 | Status: SHIPPED | OUTPATIENT
Start: 2025-02-18

## 2025-02-18 NOTE — PROGRESS NOTES
Cardiology Office Follow up      Tracee Riggins  1940  [id number]    Date: 2/18/25    CC: had concerns including Follow-up (AFIB issues, random nosebleeds, blood in eye from eye doctor, medication questions).    HPI:   Tracee Riggins  is here for routine follow up. She states she has been feeling well. She denies any episodes of CP, pressure, SOB, or syncope. Reports occasional episodes of short lasting palpitations.     Past Medical:  Past Medical History:   Diagnosis Date    Chronic kidney disease     Gout 12/2019    Gout 05/2020    Hypertension     Lumbar disc disease     Thrombocytopenia (HCC)     Vitamin D deficiency          Past Surgical:  Past Surgical History:   Procedure Laterality Date    CABG WITH MITRAL VALVE REPLACEMENT      CATARACT REMOVAL           Family History:  Family History   Problem Relation Age of Onset    Cancer Mother         breast Cancer 80's    Breast Cancer Mother     Cancer Father        Social History:  Social History     Socioeconomic History    Marital status:      Spouse name: Not on file    Number of children: Not on file    Years of education: Not on file    Highest education level: Not on file   Occupational History    Not on file   Tobacco Use    Smoking status: Never    Smokeless tobacco: Never   Vaping Use    Vaping status: Never Used   Substance and Sexual Activity    Alcohol use: No    Drug use: No    Sexual activity: Defer   Other Topics Concern    Not on file   Social History Narrative    Not on file     Social Determinants of Health     Financial Resource Strain: Low Risk  (10/8/2024)    Overall Financial Resource Strain (CARDIA)     Difficulty of Paying Living Expenses: Not hard at all   Food Insecurity: No Food Insecurity (10/8/2024)    Hunger Vital Sign     Worried About Running Out of Food in the Last Year: Never true     Ran Out of Food in the Last Year: Never true   Transportation Needs: No Transportation Needs (10/8/2024)    PRAPARE -

## 2025-03-03 RX ORDER — LEVOTHYROXINE SODIUM 50 UG/1
50 TABLET ORAL DAILY
Qty: 30 TABLET | Refills: 3 | Status: SHIPPED | OUTPATIENT
Start: 2025-03-03

## 2025-03-03 NOTE — TELEPHONE ENCOUNTER
Tracee Riggins is calling to request a refill on the following medication(s):    Medication Request:  Requested Prescriptions     Pending Prescriptions Disp Refills    levothyroxine (SYNTHROID) 50 MCG tablet [Pharmacy Med Name: Levothyroxine Sodium Oral Tablet 50 MCG] 30 tablet 0     Sig: TAKE 1 TABLET BY MOUTH EVERY DAY       Last Visit Date (If Applicable):  10/8/2024    Next Visit Date:    5/2/2025

## 2025-03-14 ENCOUNTER — ANTI-COAG VISIT (OUTPATIENT)
Age: 85
End: 2025-03-14
Payer: MEDICARE

## 2025-03-14 DIAGNOSIS — I48.0 PAROXYSMAL ATRIAL FIBRILLATION (HCC): Primary | ICD-10-CM

## 2025-03-14 DIAGNOSIS — Z95.3 S/P MITRAL VALVE REPLACEMENT WITH BIOPROSTHETIC VALVE: ICD-10-CM

## 2025-03-14 LAB
INTERNATIONAL NORMALIZATION RATIO, POC: 1.7
PROTHROMBIN TIME, POC: 20.2

## 2025-03-14 PROCEDURE — 85610 PROTHROMBIN TIME: CPT

## 2025-03-14 PROCEDURE — 99212 OFFICE O/P EST SF 10 MIN: CPT

## 2025-03-14 NOTE — PROGRESS NOTES
Medication Management Service, Warfarin Management  [facility] Medication Management, [phone]  Visit Date: 3/14/25  Subjective:   Tracee Riggins is a 84 y.o. female who presents to clinic today for anticoagulation monitoring and adjustment.     Patient was referred for warfarin management due to  Indication:    S/P mitral valve replacement with bioprosthetic valve, atrial fibrillation   INR goal: of 2-3   Duration of therapy: indefinite.     Patient reports the following:   Adherent with regimen:  Yes  Missed or extra doses:  Yes, patient missed dose Tuesday 3/11  Bleeding or thromboembolic side effects:  None  Significant medication, dietary, alcohol, or tobacco changes:  None  Significant recent illness, disease state changes, or hospitalization:  None  Upcoming surgeries or procedures:  None           Assessment and PLAN   PT/INR done in office per protocol.     INR today is 1.7, sub-therapeutic      Plan:  Boost today with warfarin 3mg, then continue warfarin 1 mg on Wednesday and 2 mg all other days until next INR check.      Using warfarin 1 mg tablets.     Recheck INR in 2 week(s).      Patient verbalized understanding of dosing directions and information discussed. Dosing schedule given to patient. Progress note sent to referring office.  Patient acknowledges working in consult agreement with pharmacist as referred by his/her physician.       Electronically signed by Orlando Gary 3/14/25 11:24a     For Pharmacy Admin Tracking Only     Intervention Detail: Dose Adjustment: 1   Total # of Interventions Recommended: 1  Total # of Interventions Accepted: 1  Time Spent (min): 20

## 2025-03-28 ENCOUNTER — ANTI-COAG VISIT (OUTPATIENT)
Age: 85
End: 2025-03-28
Payer: MEDICARE

## 2025-03-28 DIAGNOSIS — Z95.3 S/P MITRAL VALVE REPLACEMENT WITH BIOPROSTHETIC VALVE: ICD-10-CM

## 2025-03-28 DIAGNOSIS — I48.0 PAROXYSMAL ATRIAL FIBRILLATION (HCC): Primary | ICD-10-CM

## 2025-03-28 LAB
INTERNATIONAL NORMALIZATION RATIO, POC: 2.4
PROTHROMBIN TIME, POC: 28.8

## 2025-03-28 PROCEDURE — 85610 PROTHROMBIN TIME: CPT

## 2025-03-28 PROCEDURE — 99211 OFF/OP EST MAY X REQ PHY/QHP: CPT

## 2025-03-28 NOTE — PROGRESS NOTES
Medication Management Service, Warfarin Management  [facility] Medication Management, [phone]  Visit Date: 3/28/2025   Subjective:   Tracee Riggins is a 84 y.o. female who presents to clinic today for anticoagulation monitoring and adjustment.    Patient was referred for warfarin management due to  Indication:    S/P mitral valve replacement with bioprosthetic valve, atrial fibrillation   INR goal: of 2-3 - clarified with Dr. Ventura 24  Duration of therapy: indefinite.    Patient reports the following:   Adherent with regimen:  Yes  Missed or extra doses:  None - patient also reports that she thinks at her last visit she was misunderstood. She did not miss her warfarin on 3/11 as indicated at that visit.    Bleeding or thromboembolic side effects:  None  Significant medication, dietary, alcohol, or tobacco changes:  None  Significant recent illness, disease state changes, or hospitalization:  None  Upcoming surgeries or procedures:  None           Assessment and PLAN   PT/INR done in office per protocol.     INR today is 2.4, therapeutic.    Plan:  Instructed the patient to continue the current warfarin regimen of 1 mg Wed and 2 mg on all other days.  Using warfarin 1 mg tablets.    Recheck INR in 4 week(s).     Patient verbalized understanding of dosing directions and information discussed. Dosing schedule given to patient. Progress note sent to referring office.  Patient acknowledges working in consult agreement with pharmacist as referred by his/her physician.      Electronically signed by Ashley Wallis RPH on 3/28/25 at 8:09 AM EDT    For Pharmacy Admin Tracking Only    Intervention Detail: Adherence Monitorin  Total # of Interventions Recommended: 1  Total # of Interventions Accepted: 1  Time Spent (min): 20

## 2025-04-17 ENCOUNTER — TELEPHONE (OUTPATIENT)
Age: 85
End: 2025-04-17

## 2025-04-17 NOTE — TELEPHONE ENCOUNTER
[facility] Medication Management Clinic Note  Patient called and rescheduled 4/25 INR check appointment for 4/28. Last INR in range.    Ashley Correa, PharmD  [facility]  4/17/2025 10:07 AM

## 2025-04-28 ENCOUNTER — ANTI-COAG VISIT (OUTPATIENT)
Age: 85
End: 2025-04-28
Payer: MEDICARE

## 2025-04-28 DIAGNOSIS — I48.0 PAROXYSMAL ATRIAL FIBRILLATION (HCC): Primary | ICD-10-CM

## 2025-04-28 DIAGNOSIS — Z95.3 S/P MITRAL VALVE REPLACEMENT WITH BIOPROSTHETIC VALVE: ICD-10-CM

## 2025-04-28 LAB
INTERNATIONAL NORMALIZATION RATIO, POC: 2.8
PROTHROMBIN TIME, POC: 33.7

## 2025-04-28 PROCEDURE — 99211 OFF/OP EST MAY X REQ PHY/QHP: CPT

## 2025-04-28 PROCEDURE — 85610 PROTHROMBIN TIME: CPT

## 2025-04-28 NOTE — PROGRESS NOTES
Medication Management Service, Warfarin Management  [facility] Medication Management, [phone]  Visit Date: 4/28/2025   Subjective:   Tracee Riggins is a 84 y.o. female who presents to clinic today for anticoagulation monitoring and adjustment.    Patient was referred for warfarin management due to  Indication:    S/P mitral valve replacement with bioprosthetic valve, atrial fibrillation   INR goal: of 2-3 - clarified with Dr. Ventura 11/26/24  Duration of therapy: indefinite.    Patient reports the following:   Adherent with regimen:  Yes  Missed or extra doses:  None   Bleeding or thromboembolic side effects:  None  Significant medication, dietary, alcohol, or tobacco changes:  None  Significant recent illness, disease state changes, or hospitalization:  None  Upcoming surgeries or procedures:  None           Assessment and PLAN   PT/INR done in office per protocol.     INR today is 2.8, therapeutic.    Plan:  Instructed the patient to continue the current warfarin regimen of 1 mg Wed and 2 mg on all other days.  Using warfarin 1 mg tablets.    Recheck INR in 4 week(s).     Patient verbalized understanding of dosing directions and information discussed. Dosing schedule given to patient. Progress note sent to referring office.  Patient acknowledges working in consult agreement with pharmacist as referred by his/her physician.      Electronically signed by Ashley Wallis RPH on 4/28/25 at 8:57 AM EDT    For Pharmacy Admin Tracking Only    Intervention Detail:   Total # of Interventions Recommended: 0  Total # of Interventions Accepted: 0  Time Spent (min): 20

## 2025-05-01 PROBLEM — N18.32 STAGE 3B CHRONIC KIDNEY DISEASE (HCC): Status: ACTIVE | Noted: 2018-07-24

## 2025-05-02 ENCOUNTER — OFFICE VISIT (OUTPATIENT)
Age: 85
End: 2025-05-02

## 2025-05-02 VITALS
TEMPERATURE: 98.6 F | SYSTOLIC BLOOD PRESSURE: 118 MMHG | BODY MASS INDEX: 22.58 KG/M2 | WEIGHT: 90.2 LBS | HEART RATE: 52 BPM | OXYGEN SATURATION: 92 % | DIASTOLIC BLOOD PRESSURE: 76 MMHG

## 2025-05-02 DIAGNOSIS — Z79.01 LONG TERM (CURRENT) USE OF ANTICOAGULANTS: ICD-10-CM

## 2025-05-02 DIAGNOSIS — I48.20 CHRONIC ATRIAL FIBRILLATION (HCC): ICD-10-CM

## 2025-05-02 DIAGNOSIS — E03.9 ACQUIRED HYPOTHYROIDISM: ICD-10-CM

## 2025-05-02 DIAGNOSIS — I50.22 CHRONIC SYSTOLIC CONGESTIVE HEART FAILURE (HCC): Primary | ICD-10-CM

## 2025-05-02 DIAGNOSIS — N18.32 STAGE 3B CHRONIC KIDNEY DISEASE (HCC): ICD-10-CM

## 2025-05-02 ASSESSMENT — PATIENT HEALTH QUESTIONNAIRE - PHQ9
1. LITTLE INTEREST OR PLEASURE IN DOING THINGS: NOT AT ALL
SUM OF ALL RESPONSES TO PHQ QUESTIONS 1-9: 0
2. FEELING DOWN, DEPRESSED OR HOPELESS: NOT AT ALL

## 2025-05-02 NOTE — PATIENT INSTRUCTIONS
Tracee    Thank you for choosing [facility].  We know you have options when it comes to your healthcare; we appreciate that you chose us. Our goal is to provide exceptional  service and world class care to every patient.  You will be receiving a survey via email or text message asking for your feedback.  Please take a few minutes to share your thoughts about your recent visit. Your comments help us understand what we do well and ways we can improve.  Thank you in advance for your valuable feedback.      Dr. Carol Sharif, CMA

## 2025-05-02 NOTE — PROGRESS NOTES
Tracee Riggins (:  1940) is a 84 y.o. female, Established patient, here for evaluation of the following chief complaint(s):  3 Month Follow-Up (Lab done on . )         Assessment & Plan  1. Heart failure.  - Heart failure is currently well-managed by cardiology  - No chest pain, fluttering, or fluid retention in the legs reported.  - Continues to wear compression stockings.  - Advised to continue current medication regimen, including Jardiance, as it helps manage heart failure and protect kidneys. Encouraged to visit [employer] website for potential financial assistance options.    2. Medication management.  - Jardiance is costing approximately $130 per month.  - No generic version available; Farxiga is an alternative but may not be covered by insurance.  - Advised to visit [employer] for potential financial assistance options.  - Repeat blood work will be ordered during next PT and INR test to monitor kidney function.    3. Hypothyroidism  - Previously elevated TSH, on levothyroxine 50mcg, a thyroid function test will be conducted.    Follow-up  - Follow-up in 6 months for annual wellness visit.    Results  No results found for: \"LABA1C\"    Lab Results   Component Value Date/Time     2024 10:10 AM    K 5.1 2024 10:10 AM     2024 10:10 AM    CO2 28 2024 10:10 AM    BUN 33 2024 10:10 AM    CREATININE 1.2 2024 10:10 AM    GLUCOSE 80 2024 10:10 AM    CALCIUM 9.5 2024 10:10 AM    LABGLOM 44 2024 10:10 AM    LABGLOM 50 2023 01:20 PM        No results found for: \"MALBCR\"     Lab Results   Component Value Date    CHOL 152 2024    TRIG 90 2024    HDL 39 (L) 2024    VLDL 18 2024    CHOLHDLRATIO 4.0 2024        Lab Results   Component Value Date    WBC 4.6 2024    HGB 13.7 2024    HCT 44.9 2024    MCV 92.6 2024     2024       Lab Results   Component Value Date    ALT 
normal...

## 2025-05-06 ENCOUNTER — TELEPHONE (OUTPATIENT)
Age: 85
End: 2025-05-06

## 2025-05-06 DIAGNOSIS — I48.0 PAROXYSMAL ATRIAL FIBRILLATION (HCC): Primary | ICD-10-CM

## 2025-05-20 DIAGNOSIS — Z09 HOSPITAL DISCHARGE FOLLOW-UP: ICD-10-CM

## 2025-05-29 NOTE — PROGRESS NOTES
Medication Management Service, Warfarin Management  [facility] Medication Management, [phone]  Visit Date: 5/30/2025   Subjective:   Tracee Riggins is a 84 y.o. female who presents to clinic today for anticoagulation monitoring and adjustment.    Patient was referred for warfarin management due to  Indication:    S/P mitral valve replacement with bioprosthetic valve, atrial fibrillation   INR goal: of 2-3  Duration of therapy: indefinite.    Patient reports the following:   Adherent with regimen:  Yes  Missed or extra doses:  None   Bleeding or thromboembolic side effects:  None  Significant medication, dietary, alcohol, or tobacco changes:  None  Significant recent illness, disease state changes, or hospitalization:  None  Upcoming surgeries or procedures:  None           Assessment and PLAN   PT/INR done in office per protocol.     INR today is 2.5, therapeutic.    Plan:  Instructed the patient to continue the current warfarin regimen of 1 mg Wed and 2 mg on all other days.  Using warfarin 1 mg tablets.    Recheck INR in 4 week(s).     Patient verbalized understanding of dosing directions and information discussed. Dosing schedule given to patient. Progress note sent to referring office.  Patient acknowledges working in consult agreement with pharmacist as referred by his/her physician.      Electronically signed by Ashley Wallis RPH on 5/29/25 at 12:47 PM EDT    For Pharmacy Admin Tracking Only    Intervention Detail:   Total # of Interventions Recommended: 0  Total # of Interventions Accepted: 0  Time Spent (min): 20

## 2025-05-30 ENCOUNTER — ANTI-COAG VISIT (OUTPATIENT)
Age: 85
End: 2025-05-30
Payer: MEDICARE

## 2025-05-30 DIAGNOSIS — I48.0 PAROXYSMAL ATRIAL FIBRILLATION (HCC): Primary | ICD-10-CM

## 2025-05-30 DIAGNOSIS — Z95.3 S/P MITRAL VALVE REPLACEMENT WITH BIOPROSTHETIC VALVE: ICD-10-CM

## 2025-05-30 LAB
INTERNATIONAL NORMALIZATION RATIO, POC: 2.5
PROTHROMBIN TIME, POC: 30

## 2025-05-30 PROCEDURE — 85610 PROTHROMBIN TIME: CPT

## 2025-05-30 PROCEDURE — 99211 OFF/OP EST MAY X REQ PHY/QHP: CPT

## 2025-06-27 ENCOUNTER — ANTI-COAG VISIT (OUTPATIENT)
Age: 85
End: 2025-06-27
Payer: MEDICARE

## 2025-06-27 DIAGNOSIS — Z95.3 S/P MITRAL VALVE REPLACEMENT WITH BIOPROSTHETIC VALVE: ICD-10-CM

## 2025-06-27 DIAGNOSIS — I48.0 PAROXYSMAL ATRIAL FIBRILLATION (HCC): Primary | ICD-10-CM

## 2025-06-27 LAB
INTERNATIONAL NORMALIZATION RATIO, POC: 3.4
PROTHROMBIN TIME, POC: 40.9

## 2025-06-27 PROCEDURE — 99213 OFFICE O/P EST LOW 20 MIN: CPT

## 2025-06-27 PROCEDURE — 85610 PROTHROMBIN TIME: CPT

## 2025-06-27 NOTE — PROGRESS NOTES
Medication Management Service, Warfarin Management  [facility] Medication Management, [phone]  Visit Date: 2025   Subjective:   Tracee Riggins is a 84 y.o. female who presents to clinic today for anticoagulation monitoring and adjustment.    Patient was referred for warfarin management due to  Indication:    S/P mitral valve replacement with bioprosthetic valve, atrial fibrillation   INR goal: of 2-3  Duration of therapy: indefinite.    Patient reports the following:   Adherent with regimen:  Yes  Missed or extra doses:  None   Bleeding or thromboembolic side effects:  None    Significant medication, dietary, alcohol, or tobacco changes:  Warfarin tablet  changed about 2 weeks ago, they are still pink 1 mg tablets    Significant recent illness, disease state changes, or hospitalization:  None  Upcoming surgeries or procedures:  None           Assessment and PLAN   PT/INR done in office per protocol.     INR today is 3.4, supratherapeutic, unclear cause, possibly related to change in tablet  but this is unclear    Plan:  Skip warfarin today, then resume maintenance dose of warfarin 1 mg on Wednesday and 2 mg all other days until next INR check.  Using warfarin 1 mg tablets.    Recheck INR in 1.5 week(s).     Patient verbalized understanding of dosing directions and information discussed. Dosing schedule given to patient. Progress note sent to referring office.  Patient acknowledges working in consult agreement with pharmacist as referred by his/her physician.      Electronically signed by Ashley Wallis RPH on 25 at 7:41 AM EDT    For Pharmacy Admin Tracking Only    Intervention Detail: Adherence Monitorin and Dose Adjustment: 1, reason: Therapy Optimization  Total # of Interventions Recommended: 2  Total # of Interventions Accepted: 2  Time Spent (min): 20

## 2025-06-30 ENCOUNTER — HOSPITAL ENCOUNTER (OUTPATIENT)
Age: 85
Setting detail: SPECIMEN
Discharge: HOME | End: 2025-06-30

## 2025-06-30 DIAGNOSIS — E87.5 HYPERKALEMIA: ICD-10-CM

## 2025-06-30 DIAGNOSIS — N18.31 STAGE 3A CHRONIC KIDNEY DISEASE (HCC): ICD-10-CM

## 2025-06-30 DIAGNOSIS — I27.20 PULMONARY HYPERTENSION (HCC): ICD-10-CM

## 2025-06-30 DIAGNOSIS — N28.1 RENAL CYST: ICD-10-CM

## 2025-06-30 DIAGNOSIS — R80.9 ALBUMINURIA: ICD-10-CM

## 2025-06-30 DIAGNOSIS — R60.0 BILATERAL LOWER EXTREMITY EDEMA: ICD-10-CM

## 2025-06-30 DIAGNOSIS — I48.0 PAROXYSMAL ATRIAL FIBRILLATION (HCC): ICD-10-CM

## 2025-06-30 LAB
25(OH)D3 SERPL-MCNC: 40.1 NG/ML (ref 30–100)
ALBUMIN SERPL-MCNC: 4.1 G/DL (ref 3.5–5.2)
ANION GAP SERPL CALCULATED.3IONS-SCNC: 12 MMOL/L (ref 9–16)
BUN SERPL-MCNC: 56 MG/DL (ref 8–23)
CALCIUM SERPL-MCNC: 9.4 MG/DL (ref 8.6–10.4)
CHLORIDE SERPL-SCNC: 101 MMOL/L (ref 98–107)
CO2 SERPL-SCNC: 26 MMOL/L (ref 20–31)
CREAT SERPL-MCNC: 1.3 MG/DL (ref 0.6–0.9)
ERYTHROCYTE [DISTWIDTH] IN BLOOD BY AUTOMATED COUNT: 13.4 % (ref 11.8–14.4)
GFR, ESTIMATED: 40 ML/MIN/1.73M2
GLUCOSE SERPL-MCNC: 92 MG/DL (ref 74–99)
HCT VFR BLD AUTO: 44.4 % (ref 36.3–47.1)
HGB BLD-MCNC: 14.2 G/DL (ref 11.9–15.1)
MCH RBC QN AUTO: 30.2 PG (ref 25.2–33.5)
MCHC RBC AUTO-ENTMCNC: 32 G/DL (ref 28.4–34.8)
MCV RBC AUTO: 94.5 FL (ref 82.6–102.9)
NRBC BLD-RTO: 0 PER 100 WBC
PHOSPHATE SERPL-MCNC: 4.2 MG/DL (ref 2.5–4.5)
PLATELET # BLD AUTO: 144 K/UL (ref 138–453)
PMV BLD AUTO: 12.2 FL (ref 8.1–13.5)
POTASSIUM SERPL-SCNC: 5.3 MMOL/L (ref 3.7–5.3)
PTH-INTACT SERPL-MCNC: 102 PG/ML (ref 17.9–58.6)
RBC # BLD AUTO: 4.7 M/UL (ref 3.95–5.11)
SODIUM SERPL-SCNC: 139 MMOL/L (ref 136–145)
WBC OTHER # BLD: 5.5 K/UL (ref 3.5–11.3)

## 2025-07-08 ENCOUNTER — ANTI-COAG VISIT (OUTPATIENT)
Age: 85
End: 2025-07-08
Payer: MEDICARE

## 2025-07-08 DIAGNOSIS — I48.0 PAROXYSMAL ATRIAL FIBRILLATION (HCC): Primary | ICD-10-CM

## 2025-07-08 DIAGNOSIS — Z95.3 S/P MITRAL VALVE REPLACEMENT WITH BIOPROSTHETIC VALVE: ICD-10-CM

## 2025-07-08 LAB
INTERNATIONAL NORMALIZATION RATIO, POC: 4.8
PROTHROMBIN TIME, POC: 57.4

## 2025-07-08 PROCEDURE — 99213 OFFICE O/P EST LOW 20 MIN: CPT

## 2025-07-08 PROCEDURE — 85610 PROTHROMBIN TIME: CPT

## 2025-07-08 NOTE — PROGRESS NOTES
Medication Management Service, Warfarin Management  [facility] Medication Management, [phone]  Visit Date: 7/8/2025   Subjective:   Tracee Riggins is a 84 y.o. female who presents to clinic today for anticoagulation monitoring and adjustment.    Patient was referred for warfarin management due to  Indication:    S/P mitral valve replacement with bioprosthetic valve, atrial fibrillation   INR goal: of 2-3  Duration of therapy: indefinite.    Patient reports the following:   Adherent with regimen:  Yes  Missed or extra doses:  None   Bleeding or thromboembolic side effects:  None    Significant medication, dietary, alcohol, or tobacco changes: possible slight decrease in green vegetable intake, patient states she sometimes eats a vegetable medley with broccoli/cauliflower/carrots and hasn't had this recently, she wants to add this back into her diet    Significant recent illness, disease state changes, or hospitalization:  None  Upcoming surgeries or procedures:  None           Assessment and PLAN   PT/INR done in office per protocol.     INR today is 4.8, supratherapeutic, increased from 3.4 at last visit in spite of held dose. Suspect this may partly be due to fewer vegetables in diet recently, however this may also be due in part to recent change in warfarin tablet .    Plan:  hold warfarin x 2 days, then start slightly decreased maintenance dose of warfarin 1 mg on Wednesday and Saturday and 2 mg all other days. Patient will also try to resume baseline green vegetable intake.  Using warfarin 1 mg tablets.    Recheck INR in 1 week(s).     Patient verbalized understanding of dosing directions and information discussed. Dosing schedule given to patient. Progress note sent to referring office.  Patient acknowledges working in consult agreement with pharmacist as referred by his/her physician.      Electronically signed by Ashley Wallis RPH on 7/8/25 at 8:44 AM EDT    For Pharmacy

## 2025-07-09 RX ORDER — LEVOTHYROXINE SODIUM 50 UG/1
50 TABLET ORAL DAILY
Qty: 30 TABLET | Refills: 5 | Status: SHIPPED | OUTPATIENT
Start: 2025-07-09

## 2025-07-09 NOTE — TELEPHONE ENCOUNTER
Tracee Riggins is calling to request a refill on the following medication(s):    Medication Request:  Requested Prescriptions     Pending Prescriptions Disp Refills    levothyroxine (SYNTHROID) 50 MCG tablet [Pharmacy Med Name: Levothyroxine Sodium Oral Tablet 50 MCG] 30 tablet 0     Sig: TAKE 1 TABLET BY MOUTH EVERY DAY       Last Visit Date (If Applicable):  5/2/2025    Next Visit Date:    11/11/2025

## 2025-07-16 ENCOUNTER — HOSPITAL ENCOUNTER (OUTPATIENT)
Age: 85
Setting detail: SPECIMEN
Discharge: HOME | End: 2025-07-16

## 2025-07-16 DIAGNOSIS — E03.9 ACQUIRED HYPOTHYROIDISM: ICD-10-CM

## 2025-07-16 LAB — TSH SERPL DL<=0.05 MIU/L-ACNC: 1.95 UIU/ML (ref 0.27–4.2)

## 2025-07-17 ENCOUNTER — ANTI-COAG VISIT (OUTPATIENT)
Age: 85
End: 2025-07-17
Payer: MEDICARE

## 2025-07-17 DIAGNOSIS — I48.0 PAROXYSMAL ATRIAL FIBRILLATION (HCC): Primary | ICD-10-CM

## 2025-07-17 DIAGNOSIS — Z95.3 S/P MITRAL VALVE REPLACEMENT WITH BIOPROSTHETIC VALVE: ICD-10-CM

## 2025-07-17 LAB
INTERNATIONAL NORMALIZATION RATIO, POC: 4.5
PROTHROMBIN TIME, POC: 53.6

## 2025-07-17 PROCEDURE — 99213 OFFICE O/P EST LOW 20 MIN: CPT

## 2025-07-17 PROCEDURE — 85610 PROTHROMBIN TIME: CPT

## 2025-07-17 NOTE — PROGRESS NOTES
Medication Management Service, Warfarin Management  [facility] Medication Management, [phone]  Visit Date: 7/17/2025   Subjective:   Tracee Riggins is a 84 y.o. female who presents to clinic today for anticoagulation monitoring and adjustment.    Patient was referred for warfarin management due to  Indication:    S/P mitral valve replacement with bioprosthetic valve, atrial fibrillation   INR goal: of 2-3  Duration of therapy: indefinite.    Patient reports the following:   Adherent with regimen:  somewhat  Missed or extra doses:  patient held two doses as directed at last visit for elevated INR, however she did not follow new dosing schedule as directed at last visit and had resumed dose of warfarin 1 mg on Wednesday and 2 mg all other days     Bleeding or thromboembolic side effects:  None    Significant medication, dietary, alcohol, or tobacco changes:  reviewed medication list in full, NO CHANGES in any way in regard to medications/supplements. Has had a little more vegetables in her diet which she will continue.    Significant recent illness, disease state changes, or hospitalization:  complaining of leg swelling today and occasional shortness of breath, advised to move next appointment with PCP and/or cardiologist sooner. She is currently on Lasix just 3 days weekly.    Upcoming surgeries or procedures:  None           Assessment and PLAN   PT/INR done in office per protocol.     INR today is 4.5, supratherapeutic.    Plan:  Hold warfarin x 2 days, then start decreased maintenance dose of warfarin 1 mg on MWF and 2 mg all other days until next INR check.  Using warfarin 1 mg tablets.    Recheck INR in 1 week(s).     Patient verbalized understanding of dosing directions and information discussed. Dosing schedule given to patient. Progress note sent to referring office.  Patient acknowledges working in consult agreement with pharmacist as referred by his/her physician.      Electronically

## 2025-07-19 ENCOUNTER — HOSPITAL ENCOUNTER (INPATIENT)
Age: 85
LOS: 4 days | Discharge: HOME | DRG: 291 | End: 2025-07-23
Attending: EMERGENCY MEDICINE | Admitting: FAMILY MEDICINE
Payer: MEDICARE

## 2025-07-19 ENCOUNTER — APPOINTMENT (OUTPATIENT)
Dept: GENERAL RADIOLOGY | Age: 85
DRG: 291 | End: 2025-07-19
Payer: MEDICARE

## 2025-07-19 DIAGNOSIS — I48.91 ATRIAL FIBRILLATION WITH RAPID VENTRICULAR RESPONSE (HCC): Primary | ICD-10-CM

## 2025-07-19 DIAGNOSIS — I50.23 ACUTE ON CHRONIC SYSTOLIC CONGESTIVE HEART FAILURE (HCC): ICD-10-CM

## 2025-07-19 DIAGNOSIS — J90 BILATERAL PLEURAL EFFUSION: ICD-10-CM

## 2025-07-19 DIAGNOSIS — N17.9 AKI (ACUTE KIDNEY INJURY): ICD-10-CM

## 2025-07-19 PROBLEM — I50.9 HEART FAILURE (HCC): Status: ACTIVE | Noted: 2025-07-19

## 2025-07-19 LAB
ANION GAP SERPL CALCULATED.3IONS-SCNC: 15 MMOL/L (ref 9–16)
BACTERIA URNS QL MICRO: ABNORMAL
BASOPHILS # BLD: 0.1 K/UL (ref 0–0.2)
BASOPHILS NFR BLD: 2 % (ref 0–2)
BILIRUB UR QL STRIP: NEGATIVE
BNP SERPL-MCNC: 5232 PG/ML (ref 0–450)
BUN SERPL-MCNC: 69 MG/DL (ref 8–23)
CALCIUM SERPL-MCNC: 8.7 MG/DL (ref 8.6–10.4)
CASTS #/AREA URNS LPF: ABNORMAL /LPF
CASTS #/AREA URNS LPF: ABNORMAL /LPF
CHARACTER UR: ABNORMAL
CHLORIDE SERPL-SCNC: 99 MMOL/L (ref 98–107)
CLARITY UR: CLEAR
CO2 SERPL-SCNC: 19 MMOL/L (ref 20–31)
COLOR UR: YELLOW
CREAT SERPL-MCNC: 1.8 MG/DL (ref 0.6–0.9)
EKG Q-T INTERVAL: 366 MS
EKG QRS DURATION: 76 MS
EKG QTC CALCULATION (BAZETT): 488 MS
EKG R AXIS: 54 DEGREES
EKG T AXIS: 129 DEGREES
EKG VENTRICULAR RATE: 107 BPM
EOSINOPHIL # BLD: 0 K/UL (ref 0–0.4)
EOSINOPHILS RELATIVE PERCENT: 0 % (ref 1–4)
EPI CELLS #/AREA URNS HPF: ABNORMAL /HPF (ref 0–5)
ERYTHROCYTE [DISTWIDTH] IN BLOOD BY AUTOMATED COUNT: 13.8 % (ref 12.5–15.4)
GFR, ESTIMATED: 27 ML/MIN/1.73M2
GLUCOSE SERPL-MCNC: 119 MG/DL (ref 74–99)
GLUCOSE UR STRIP-MCNC: ABNORMAL MG/DL
HCT VFR BLD AUTO: 41.9 % (ref 36–46)
HGB BLD-MCNC: 13.8 G/DL (ref 12–16)
HGB UR QL STRIP.AUTO: ABNORMAL
INR PPP: 2.7 (ref 0.9–1.2)
KETONES UR STRIP-MCNC: NEGATIVE MG/DL
LEUKOCYTE ESTERASE UR QL STRIP: NEGATIVE
LYMPHOCYTES NFR BLD: 1 K/UL (ref 1–4.8)
LYMPHOCYTES RELATIVE PERCENT: 16 % (ref 24–44)
MAGNESIUM SERPL-MCNC: 2.7 MG/DL (ref 1.6–2.4)
MCH RBC QN AUTO: 30.1 PG (ref 26–34)
MCHC RBC AUTO-ENTMCNC: 32.9 G/DL (ref 31–37)
MCV RBC AUTO: 91.3 FL (ref 80–100)
MONOCYTES NFR BLD: 0.5 K/UL (ref 0.1–1.2)
MONOCYTES NFR BLD: 7 % (ref 2–11)
NEUTROPHILS NFR BLD: 75 % (ref 36–66)
NEUTS SEG NFR BLD: 4.7 K/UL (ref 1.8–7.7)
NITRITE UR QL STRIP: NEGATIVE
PH UR STRIP: 6 [PH] (ref 5–8)
PLATELET # BLD AUTO: 137 K/UL (ref 140–450)
PMV BLD AUTO: 10.5 FL (ref 6–12)
POTASSIUM SERPL-SCNC: 4.5 MMOL/L (ref 3.7–5.3)
PROT UR STRIP-MCNC: NEGATIVE MG/DL
PROTHROMBIN TIME: 30.2 SEC (ref 11.8–14.6)
RBC # BLD AUTO: 4.59 M/UL (ref 4–5.2)
RBC #/AREA URNS HPF: ABNORMAL /HPF (ref 0–2)
SODIUM SERPL-SCNC: 133 MMOL/L (ref 136–145)
SP GR UR STRIP: 1.01 (ref 1–1.03)
TROPONIN I SERPL HS-MCNC: 30 NG/L (ref 0–14)
TROPONIN I SERPL HS-MCNC: 32 NG/L (ref 0–14)
TSH SERPL DL<=0.05 MIU/L-ACNC: 2.49 UIU/ML (ref 0.27–4.2)
UROBILINOGEN UR STRIP-ACNC: NORMAL EU/DL (ref 0–1)
WBC #/AREA URNS HPF: ABNORMAL /HPF (ref 0–5)
WBC OTHER # BLD: 6.3 K/UL (ref 3.5–11)

## 2025-07-19 PROCEDURE — 93005 ELECTROCARDIOGRAM TRACING: CPT | Performed by: NURSE PRACTITIONER

## 2025-07-19 PROCEDURE — 96375 TX/PRO/DX INJ NEW DRUG ADDON: CPT

## 2025-07-19 PROCEDURE — 2580000003 HC RX 258: Performed by: NURSE PRACTITIONER

## 2025-07-19 PROCEDURE — 2500000003 HC RX 250 WO HCPCS: Performed by: FAMILY MEDICINE

## 2025-07-19 PROCEDURE — 93005 ELECTROCARDIOGRAM TRACING: CPT | Performed by: EMERGENCY MEDICINE

## 2025-07-19 PROCEDURE — 96374 THER/PROPH/DIAG INJ IV PUSH: CPT

## 2025-07-19 PROCEDURE — 2060000000 HC ICU INTERMEDIATE R&B

## 2025-07-19 PROCEDURE — 93010 ELECTROCARDIOGRAM REPORT: CPT | Performed by: INTERNAL MEDICINE

## 2025-07-19 PROCEDURE — 83735 ASSAY OF MAGNESIUM: CPT

## 2025-07-19 PROCEDURE — 84484 ASSAY OF TROPONIN QUANT: CPT

## 2025-07-19 PROCEDURE — 84443 ASSAY THYROID STIM HORMONE: CPT

## 2025-07-19 PROCEDURE — 81001 URINALYSIS AUTO W/SCOPE: CPT

## 2025-07-19 PROCEDURE — 99285 EMERGENCY DEPT VISIT HI MDM: CPT

## 2025-07-19 PROCEDURE — 36415 COLL VENOUS BLD VENIPUNCTURE: CPT

## 2025-07-19 PROCEDURE — 6360000002 HC RX W HCPCS: Performed by: NURSE PRACTITIONER

## 2025-07-19 PROCEDURE — 83880 ASSAY OF NATRIURETIC PEPTIDE: CPT

## 2025-07-19 PROCEDURE — 6370000000 HC RX 637 (ALT 250 FOR IP): Performed by: FAMILY MEDICINE

## 2025-07-19 PROCEDURE — 80048 BASIC METABOLIC PNL TOTAL CA: CPT

## 2025-07-19 PROCEDURE — 99222 1ST HOSP IP/OBS MODERATE 55: CPT | Performed by: FAMILY MEDICINE

## 2025-07-19 PROCEDURE — 85025 COMPLETE CBC W/AUTO DIFF WBC: CPT

## 2025-07-19 PROCEDURE — 85610 PROTHROMBIN TIME: CPT

## 2025-07-19 PROCEDURE — 71045 X-RAY EXAM CHEST 1 VIEW: CPT

## 2025-07-19 RX ORDER — 0.9 % SODIUM CHLORIDE 0.9 %
500 INTRAVENOUS SOLUTION INTRAVENOUS ONCE
Status: DISCONTINUED | OUTPATIENT
Start: 2025-07-19 | End: 2025-07-19

## 2025-07-19 RX ORDER — LEVOTHYROXINE SODIUM 50 UG/1
50 TABLET ORAL DAILY
Status: DISCONTINUED | OUTPATIENT
Start: 2025-07-20 | End: 2025-07-23 | Stop reason: HOSPADM

## 2025-07-19 RX ORDER — SODIUM CHLORIDE 0.9 % (FLUSH) 0.9 %
5-40 SYRINGE (ML) INJECTION EVERY 12 HOURS SCHEDULED
Status: DISCONTINUED | OUTPATIENT
Start: 2025-07-19 | End: 2025-07-23 | Stop reason: HOSPADM

## 2025-07-19 RX ORDER — METOPROLOL SUCCINATE 25 MG/1
12.5 TABLET, EXTENDED RELEASE ORAL DAILY
Status: DISCONTINUED | OUTPATIENT
Start: 2025-07-19 | End: 2025-07-20

## 2025-07-19 RX ORDER — ONDANSETRON 2 MG/ML
4 INJECTION INTRAMUSCULAR; INTRAVENOUS EVERY 6 HOURS PRN
Status: DISCONTINUED | OUTPATIENT
Start: 2025-07-19 | End: 2025-07-23 | Stop reason: HOSPADM

## 2025-07-19 RX ORDER — FUROSEMIDE 10 MG/ML
20 INJECTION INTRAMUSCULAR; INTRAVENOUS DAILY
Status: DISCONTINUED | OUTPATIENT
Start: 2025-07-20 | End: 2025-07-20

## 2025-07-19 RX ORDER — ACETAMINOPHEN 325 MG/1
650 TABLET ORAL EVERY 6 HOURS PRN
Status: DISCONTINUED | OUTPATIENT
Start: 2025-07-19 | End: 2025-07-23 | Stop reason: HOSPADM

## 2025-07-19 RX ORDER — METOPROLOL TARTRATE 1 MG/ML
5 INJECTION, SOLUTION INTRAVENOUS ONCE
Status: DISCONTINUED | OUTPATIENT
Start: 2025-07-19 | End: 2025-07-19

## 2025-07-19 RX ORDER — ACETAMINOPHEN 650 MG/1
650 SUPPOSITORY RECTAL EVERY 6 HOURS PRN
Status: DISCONTINUED | OUTPATIENT
Start: 2025-07-19 | End: 2025-07-23 | Stop reason: HOSPADM

## 2025-07-19 RX ORDER — POLYETHYLENE GLYCOL 3350 17 G/17G
17 POWDER, FOR SOLUTION ORAL DAILY PRN
Status: DISCONTINUED | OUTPATIENT
Start: 2025-07-19 | End: 2025-07-23 | Stop reason: HOSPADM

## 2025-07-19 RX ORDER — SODIUM CHLORIDE 9 MG/ML
INJECTION, SOLUTION INTRAVENOUS PRN
Status: DISCONTINUED | OUTPATIENT
Start: 2025-07-19 | End: 2025-07-23 | Stop reason: HOSPADM

## 2025-07-19 RX ORDER — ONDANSETRON 4 MG/1
4 TABLET, ORALLY DISINTEGRATING ORAL EVERY 8 HOURS PRN
Status: DISCONTINUED | OUTPATIENT
Start: 2025-07-19 | End: 2025-07-23 | Stop reason: HOSPADM

## 2025-07-19 RX ORDER — WARFARIN SODIUM 1 MG/1
2 TABLET ORAL ONCE
Status: COMPLETED | OUTPATIENT
Start: 2025-07-19 | End: 2025-07-19

## 2025-07-19 RX ORDER — FUROSEMIDE 10 MG/ML
20 INJECTION INTRAMUSCULAR; INTRAVENOUS ONCE
Status: COMPLETED | OUTPATIENT
Start: 2025-07-19 | End: 2025-07-19

## 2025-07-19 RX ORDER — SODIUM CHLORIDE 0.9 % (FLUSH) 0.9 %
5-40 SYRINGE (ML) INJECTION PRN
Status: DISCONTINUED | OUTPATIENT
Start: 2025-07-19 | End: 2025-07-23 | Stop reason: HOSPADM

## 2025-07-19 RX ADMIN — METOPROLOL SUCCINATE 12.5 MG: 25 TABLET, EXTENDED RELEASE ORAL at 15:48

## 2025-07-19 RX ADMIN — WARFARIN SODIUM 2 MG: 1 TABLET ORAL at 17:29

## 2025-07-19 RX ADMIN — DILTIAZEM HYDROCHLORIDE 5 MG/HR: 5 INJECTION INTRAVENOUS at 12:07

## 2025-07-19 RX ADMIN — SODIUM CHLORIDE, PRESERVATIVE FREE 10 ML: 5 INJECTION INTRAVENOUS at 19:40

## 2025-07-19 RX ADMIN — FUROSEMIDE 20 MG: 10 INJECTION, SOLUTION INTRAMUSCULAR; INTRAVENOUS at 14:03

## 2025-07-19 ASSESSMENT — PAIN - FUNCTIONAL ASSESSMENT: PAIN_FUNCTIONAL_ASSESSMENT: NONE - DENIES PAIN

## 2025-07-19 NOTE — PLAN OF CARE
Problem: Chronic Conditions and Co-morbidities  Goal: Patient's chronic conditions and co-morbidity symptoms are monitored and maintained or improved  Outcome: Progressing  Flowsheets (Taken 7/19/2025 1530)  Care Plan - Patient's Chronic Conditions and Co-Morbidity Symptoms are Monitored and Maintained or Improved: Monitor and assess patient's chronic conditions and comorbid symptoms for stability, deterioration, or improvement     Problem: Discharge Planning  Goal: Discharge to home or other facility with appropriate resources  Outcome: Progressing  Flowsheets (Taken 7/19/2025 1530)  Discharge to home or other facility with appropriate resources: Identify barriers to discharge with patient and caregiver     Problem: ABCDS Injury Assessment  Goal: Absence of physical injury  Outcome: Progressing     Problem: Safety - Adult  Goal: Free from fall injury  Outcome: Progressing

## 2025-07-19 NOTE — ED NOTES
ED to inpatient nurses report      Chief Complaint:  Chief Complaint   Patient presents with    Extremity Weakness     Patient c/o increased lower extremity weakness and lower leg swelling for about a week now. [place] Patient has hx of afib.     Leg Swelling     Present to ED from: home with c/o of increased fatigue, SOB, increased swelling to BLE    MOA:     LOC: alert and orientated to name, place, date  Mobility: stand by  Oxygen Baseline: RA    Current needs required: RA   Pending ED orders: none  Present condition: stable    Why did the patient come to the ED? Increased swelling and fatigue    What is the plan? IV cardizem    Any procedures or intervention occur? IV, labs    Pertinent event(s) Pt initial heart rate was 140-150s, pt in aflutter, started on IV cardizem drip, pt HR now 102    Safety concerns?? Fall risk    CODE STATUS Full Code    Diet ADULT DIET; Regular    Mental Status:  Level of Consciousness: Alert (0)    Psych Assessment:   Psychosocial  Psychosocial (WDL): Within Defined Limits  Vital signs   Vitals:    07/19/25 1300 07/19/25 1315 07/19/25 1330 07/19/25 1345   BP: 121/64 110/83 137/73 124/88   Pulse: (!) 106 (!) 105 (!) 106 (!) 106   Resp: 21 16 14 13   Temp:       TempSrc:       SpO2: 98% 95% 90% 96%   Weight:            Vitals:  Patient Vitals for the past 24 hrs:   BP Temp Temp src Pulse Resp SpO2 Weight   07/19/25 1345 124/88 -- -- (!) 106 13 96 % --   07/19/25 1330 137/73 -- -- (!) 106 14 90 % --   07/19/25 1315 110/83 -- -- (!) 105 16 95 % --   07/19/25 1300 121/64 -- -- (!) 106 21 98 % --   07/19/25 1245 119/81 -- -- (!) 107 -- 95 % --   07/19/25 1230 122/88 -- -- (!) 141 16 96 % --   07/19/25 1215 (!) 122/90 -- -- (!) 143 23 96 % --   07/19/25 1127 (!) 122/93 98 °F (36.7 °C) Oral (!) 144 18 97 % 42.9 kg (94 lb 8 oz)      Visit Vitals  /88   Pulse (!) 106   Temp 98 °F (36.7 °C) (Oral)   Resp 13   Wt 42.9 kg (94 lb 8 oz)   SpO2 96%    BMI 23.65 kg/m²        LDAs:   Peripheral IV 07/19/25 Right Antecubital (Active)   Site Assessment Clean, dry & intact 07/19/25 1152   Line Status Blood return noted;Flushed;Normal saline locked;Specimen collected 07/19/25 1152   Phlebitis Assessment No symptoms 07/19/25 1152   Infiltration Assessment 0 07/19/25 1152   Alcohol Cap Used Yes 07/19/25 1152   Dressing Status New dressing applied 07/19/25 1152   Dressing Type Transparent 07/19/25 1152   Dressing Intervention New 07/19/25 1152       Meds:  Medications   dilTIAZem 125 mg in sodium chloride 0.9 % 125 mL infusion (5 mg/hr IntraVENous New Bag 7/19/25 1207)   sodium chloride flush 0.9 % injection 5-40 mL (has no administration in time range)   sodium chloride flush 0.9 % injection 5-40 mL (has no administration in time range)   0.9 % sodium chloride infusion (has no administration in time range)   ondansetron (ZOFRAN-ODT) disintegrating tablet 4 mg (has no administration in time range)     Or   ondansetron (ZOFRAN) injection 4 mg (has no administration in time range)   polyethylene glycol (GLYCOLAX) packet 17 g (has no administration in time range)   acetaminophen (TYLENOL) tablet 650 mg (has no administration in time range)     Or   acetaminophen (TYLENOL) suppository 650 mg (has no administration in time range)   furosemide (LASIX) injection 20 mg (has no administration in time range)   levothyroxine (SYNTHROID) tablet 50 mcg (has no administration in time range)   warfarin (COUMADIN) tablet 2 mg (has no administration in time range)   metoprolol succinate (TOPROL XL) extended release tablet 12.5 mg (has no administration in time range)   warfarin placeholder: dosing by pharmacy (has no administration in time range)   furosemide (LASIX) injection 20 mg (20 mg IntraVENous Given 7/19/25 1403)          Ambulatory Status:  No data recorded    Diagnosis:  DISPOSITION Admitted 07/19/2025 02:23:06 PM   Final diagnoses:   Atrial fibrillation with rapid ventricular  response (HCC)   Bilateral pleural effusion            Assessment Abnormalities : (List)  Neuro: Confused   Yes[] No[]    Respiratory : Labored  Yes[] No[]  Lung Sounds clear    Cardiac:   Regular  Yes[] No[]  Irregular Yes[] No[]    Rhythm: afib    :  Incontinent  Yes[] No[]     Assessment Abnormalities irregular heart rate      Skin Assessment: Pt had multiple IV attempts, left arm with some bruising and swelling to IV attempt site       Pain Score:  Pain Assessment  Pain Assessment: None - Denies Pain    ISOLATION Status  No active isolations    Labs:  Labs Reviewed   CBC WITH AUTO DIFFERENTIAL - Abnormal; Notable for the following components:       Result Value    Platelets 137 (*)     Neutrophils % 75 (*)     Lymphocytes % 16 (*)     Eosinophils % 0 (*)     All other components within normal limits   BASIC METABOLIC PANEL - Abnormal; Notable for the following components:    Sodium 133 (*)     CO2 19 (*)     Glucose 119 (*)     BUN 69 (*)     Creatinine 1.8 (*)     Est, Glom Filt Rate 27 (*)     All other components within normal limits   MAGNESIUM - Abnormal; Notable for the following components:    Magnesium 2.7 (*)     All other components within normal limits   TROPONIN - Abnormal; Notable for the following components:    Troponin, High Sensitivity 32 (*)     All other components within normal limits   PROTIME-INR - Abnormal; Notable for the following components:    Protime 30.2 (*)     INR 2.7 (*)     All other components within normal limits   URINALYSIS WITH REFLEX TO CULTURE - Abnormal; Notable for the following components:    Glucose, Ur 2+ (*)     Urine Hgb TRACE (*)     All other components within normal limits   BRAIN NATRIURETIC PEPTIDE - Abnormal; Notable for the following components:    NT Pro-BNP 5,232 (*)     All other components within normal limits   TROPONIN - Abnormal; Notable for the following components:    Troponin, High Sensitivity 30 (*)     All other components within normal limits

## 2025-07-19 NOTE — ED PROVIDER NOTES
[address]  EMERGENCY DEPARTMENT ENCOUNTER      Pt Name: Tracee Riggins  MRN: [id number]  Birthdate 1940  Date of evaluation: 7/19/2025  Provider: FAM Morales CNP  3:53 PM    CHIEF COMPLAINT       Chief Complaint   Patient presents with    Extremity Weakness     Patient c/o increased lower extremity weakness and lower leg swelling for about a week now. [place]. Patient has hx of afib.     Leg Swelling         HISTORY OF PRESENT ILLNESS    Tracee Riggins is a 84 y.o. female who presents to the emergency department for evaluation of weakness.  Patient states for the past week she has felt intermittently short of breath and just overall weakness.  And really thought too much about it but it culminated today when she [place] the zoo [place] and she felt like she really could not walk around the park due to extreme weakness.  No chest pain no chest tightness no shortness of breath.  She does notice increased swelling to the legs right worse than the left but that is typical.  No headaches, no vision changes no unilateral neurofocal deficits.  No dizziness no lightheadedness  HPI    Nursing Notes were reviewed.    REVIEW OF SYSTEMS       Review of Systems   All other systems reviewed and are negative.      Except as noted above the remainder of the review of systems was reviewed and negative.       PAST MEDICAL HISTORY     Past Medical History:   Diagnosis Date    Chronic kidney disease     Gout 12/2019    Gout 05/2020    Hypertension     Lumbar disc disease     Thrombocytopenia     Vitamin D deficiency          SURGICAL HISTORY       Past Surgical History:   Procedure Laterality Date    CABG WITH MITRAL VALVE REPLACEMENT      CATARACT REMOVAL           CURRENT MEDICATIONS       Current Discharge Medication List        CONTINUE these medications which have NOT CHANGED    Details   levothyroxine (SYNTHROID) 50 MCG tablet TAKE 1 TABLET BY MOUTH EVERY  dose.  She has felt palpitations to the entire week intermittently.  She is not certain whether or not she is in atrial fibrillation/flutter time but in review of records it does appear that cardioversion last year was successful to keep her in a sinus rhythm.  She converted on a Cardizem drip previously well.  We will check labs, thyroids, electrolytes, start Cardizem drip.  She is normotensive afebrile and otherwise stable    Heart rate improved to 106 on Cardizem, remains in atrial fibrillation.  Blood pressure stable.  She feels a little improved    Chest x-ray with bilateral pleural effusions, BNP Significantly elevated, troponin at baseline, creatinine is elevated at 1.8 slightly above baseline.  Magnesium is high at 2.7    Remainder of blood work is clinically insignificant    Discussed the case with Dr. Ventura who is on-call for patient's family practice provider is agreeable to admit    REASSESSMENT     ED Course as of 07/19/25 1553   Sat Jul 19, 2025   1154 Cardioversion 9/30/2024 [MR]   1154 Echo in August 2024 shows reduced EF consistent with pulmonary hypertension as well [MR]   1155 Patient did not take her morning medications will trial 5 mg of Lopressor IV to see if we can slow heart rate [MR]   1328 NT Pro-BNP(!): 5,232  Significantly elevated from [MR]   1328 Creatinine(!): 1.8  Slightly higher than baseline of 1.2 [MR]   1328 Troponin, High Sensitivity(!): 32  Stable [MR]      ED Course User Index  [MR] Twyla Tamez HAYLEY, APRN - CNP         CRITICAL CARE TIME   Total Critical Care time was 40 minutes, excluding separately reportable procedures.  There was a high probability of clinically significant/life threatening deterioration in the patient's condition which required my urgent intervention.      CONSULTS:  IP CONSULT TO CARDIOLOGY  IP CONSULT TO PHARMACY  IP CONSULT TO NEPHROLOGY    PROCEDURES:  Unless otherwise noted below, none     Procedures        FINAL IMPRESSION      1. Atrial

## 2025-07-19 NOTE — PROGRESS NOTES
Pharmacy Note  Warfarin Consult    Tracee Riggins is a 84 y.o. female for whom pharmacy has been consulted to manage warfarin therapy.     Physician: Dr. Ventura  Reason for admission: acute on chronic HF    Warfarin dose PTA: warfarin 1 mg on MWF and 2 mg all other days - dosed by [facility], this is a new (reduced) dose for patient after recent elevations in INR    Indication: a fib  Goal INR: 2-3    Past Medical History:   Diagnosis Date    Chronic kidney disease     Gout 12/2019    Gout 05/2020    Hypertension     Lumbar disc disease     Thrombocytopenia     Vitamin D deficiency                 Recent Labs     07/19/25  1152   INR 2.7*     Recent Labs     07/19/25  1152   HGB 13.8   HCT 41.9   *       Current warfarin drug-drug interactions:  no relevant new drug interactions    Date INR Dose   07/19 2.7 2 mg               Daily PT/INR while inpatient. Give home dose of warfarin 2 mg today.    Thank you for the consult.  Will continue to follow.    Ashley Correa, PharmD  [facility]  7/19/2025 2:33 PM

## 2025-07-19 NOTE — ED PROVIDER NOTES
Emergency Department     Faculty Attestation    I performed a history and physical examination of the patient and discussed management with the mid level provider. I reviewed the mid level provider's note and agree with the documented findings and plan of care. Any areas of disagreement are noted on the chart. I was personally present for the key portions of any procedures. I have documented in the chart those procedures where I was not present during the key portions. I have reviewed the emergency nurses triage note. I agree with the chief complaint, past medical history, past surgical history, allergies, medications, social and family history as documented unless otherwise noted below. Documentation of the HPI, Physical Exam and Medical Decision Making performed by medical students or scribes is based on my personal performance of the HPI, PE and MDM. For Physician Assistant/ Nurse Practitioner cases/documentation I have personally evaluated this patient and have completed at least one if not all key elements of the E/M (history, physical exam, and MDM). Additional findings are as noted.      Primary Care Physician:  Lo Medina MD    CHIEF COMPLAINT       Chief Complaint   Patient presents with    Extremity Weakness     Patient c/o increased lower extremity weakness and lower leg swelling for about a week now. Patient was at the [employer] eating and patients family. Patient has hx of afib.     Leg Swelling       RECENT VITALS:   Temp: 98 °F (36.7 °C),  Pulse: (!) 144, Respirations: 18, BP: (!) 122/93    LABS:  Labs Reviewed   CBC WITH AUTO DIFFERENTIAL - Abnormal; Notable for the following components:       Result Value    Platelets 137 (*)     Neutrophils % 75 (*)     Lymphocytes % 16 (*)     Eosinophils % 0 (*)     All other components within normal limits   BASIC METABOLIC PANEL - Abnormal; Notable for the following components:    Sodium 133 (*)     CO2 19 (*)     Glucose 119 (*)     BUN 69 (*)

## 2025-07-19 NOTE — H&P
[facility] Medicine   IN-PATIENT [facility] - Location: [place]    HISTORY AND PHYSICAL EXAMINATION            Date:   7/19/2025  Patient name:  Tracee Riggins  Date of admission:  7/19/2025 11:22 AM  MRN:   [id number]  Account:  [id number]  YOB: 1940  PCP:    Lo Medina MD  Room:   [facility]  Code Status:    Full Code      History Obtained From:     patient    History of Present Illness:     Tracee Riggins is a 84 y.o. Non- / non  female who presents with Extremity Weakness (Patient c/o increased lower extremity weakness and lower leg swelling for about a week now. [place]. Patient has hx of afib. ) and Leg Swelling   and is admitted to the hospital for the management of Acute on chronic clinical systolic heart failure (HCC).    84-year-old female with known history of heart failure and A-fib presents with A-fib with RVR.  She states that she has not felt the best for the last week.  She has been more short of breath.  [place] and was not able to ambulate with family due to dyspnea.  She also has had increased lower extremity edema, States 2 pound weight gain and palpitations over the last week.  She denies any palpitations today.  No chest pain, intermittent lightheaded feeling.  2 pillow orthopnea.  History of valvular heart disease.  Her mitral valve has been replaced, however she does have severe MR.  She follows with Dr. Diaz for the above.  Medications include metoprolol, coumadiin, Jardiance and lasix 3 x per week.  BNP >5000    She follows with Dr. Leon for CKD.    She states she has been sleeping ok, appetite a little less this week.  No abdominal pain, making a little less urine this week.  No vomiting or diarrhea.  No blood in her stool or urine.      Past Medical History:     Past Medical History:   Diagnosis Date    Chronic kidney disease     Gout 12/2019    Gout 05/2020        TSH 2.49 0.27 - 4.20 uIU/mL   Brain Natriuretic Peptide    Collection Time: 07/19/25 11:52 AM   Result Value Ref Range    NT Pro-BNP 5,232 (H) 0 - 450 pg/mL   EKG 12 Lead    Collection Time: 07/19/25  1:00 PM   Result Value Ref Range    Ventricular Rate 107 BPM    QRS Duration 76 ms    Q-T Interval 366 ms    QTc Calculation (Bazett) 488 ms    R Axis 54 degrees    T Axis 129 degrees   Urinalysis with Reflex to Culture    Collection Time: 07/19/25  2:03 PM    Specimen: Urine   Result Value Ref Range    Color, UA Yellow Yellow    Turbidity UA Clear Clear    Glucose, Ur 2+ (A) NEGATIVE mg/dL    Bilirubin, Urine NEGATIVE NEGATIVE    Ketones, Urine NEGATIVE NEGATIVE mg/dL    Specific Gravity, UA 1.015 1.005 - 1.030    Urine Hgb TRACE (A) NEGATIVE    pH, Urine 6.0 5.0 - 8.0    Protein, UA NEGATIVE NEGATIVE mg/dL    Urobilinogen, Urine Normal 0.0 - 1.0 EU/dL    Nitrite, Urine NEGATIVE NEGATIVE    Leukocyte Esterase, Urine NEGATIVE NEGATIVE   Microscopic Urinalysis    Collection Time: 07/19/25  2:03 PM   Result Value Ref Range    WBC, UA 0 TO 2 0 - 5 /HPF    RBC, UA 5 TO 10 0 - 2 /HPF    Casts UA 2 TO 5 /LPF    Casts UA HYALINE /LPF    Epithelial Cells, UA 0 TO 2 0 - 5 /HPF    Bacteria, UA FEW (A) None    Other Observations UA (A) NOT REQ.     Utilizing a urinalysis as the only screening method to exclude a potential uropathogen can be unreliable in many patient populations.  Rapid screening tests are less sensitive than culture and if UTI is a clinical possibility, culture should be considered despite a negative urinalysis.     Troponin    Collection Time: 07/19/25  2:09 PM   Result Value Ref Range    Troponin, High Sensitivity 30 (H) 0 - 14 ng/L       Imaging/Diagnostics:  XR CHEST PORTABLE  Result Date: 7/19/2025  Small pleural effusions with bibasilar atelectasis.       Assessment :      Hospital Problems           Last Modified POA    * (Principal) Acute on chronic clinical systolic heart failure (HCC) 7/19/2025 Yes

## 2025-07-20 PROBLEM — I95.9 ARTERIAL HYPOTENSION: Status: ACTIVE | Noted: 2025-07-20

## 2025-07-20 PROBLEM — J90 BILATERAL PLEURAL EFFUSION: Status: ACTIVE | Noted: 2025-07-20

## 2025-07-20 LAB
ALBUMIN SERPL-MCNC: 3.7 G/DL (ref 3.5–5.2)
ALBUMIN/GLOB SERPL: 1.5 {RATIO} (ref 1–2.5)
ALP SERPL-CCNC: 143 U/L (ref 35–104)
ALT SERPL-CCNC: 42 U/L (ref 10–35)
ANION GAP SERPL CALCULATED.3IONS-SCNC: 11 MMOL/L (ref 9–16)
AST SERPL-CCNC: 42 U/L (ref 10–35)
BACTERIA URNS QL MICRO: ABNORMAL
BASOPHILS # BLD: 0.1 K/UL (ref 0–0.2)
BASOPHILS NFR BLD: 2 % (ref 0–2)
BILIRUB SERPL-MCNC: 0.8 MG/DL (ref 0–1.2)
BILIRUB UR QL STRIP: NEGATIVE
BUN SERPL-MCNC: 68 MG/DL (ref 8–23)
CALCIUM SERPL-MCNC: 8.9 MG/DL (ref 8.6–10.4)
CHLORIDE SERPL-SCNC: 101 MMOL/L (ref 98–107)
CHLORIDE UR-SCNC: <20 MMOL/L
CLARITY UR: CLEAR
CO2 SERPL-SCNC: 27 MMOL/L (ref 20–31)
COLOR UR: YELLOW
CREAT SERPL-MCNC: 1.9 MG/DL (ref 0.6–0.9)
CREAT UR-MCNC: 79.3 MG/DL (ref 28–217)
EOSINOPHIL # BLD: 0.1 K/UL (ref 0–0.4)
EOSINOPHILS RELATIVE PERCENT: 1 % (ref 1–4)
EPI CELLS #/AREA URNS HPF: ABNORMAL /HPF (ref 0–5)
ERYTHROCYTE [DISTWIDTH] IN BLOOD BY AUTOMATED COUNT: 13.9 % (ref 12.5–15.4)
GFR, ESTIMATED: 25 ML/MIN/1.73M2
GLUCOSE SERPL-MCNC: 110 MG/DL (ref 74–99)
GLUCOSE UR STRIP-MCNC: ABNORMAL MG/DL
HCT VFR BLD AUTO: 41.1 % (ref 36–46)
HGB BLD-MCNC: 13.4 G/DL (ref 12–16)
HGB UR QL STRIP.AUTO: ABNORMAL
INR PPP: 2.1 (ref 0.9–1.2)
KETONES UR STRIP-MCNC: NEGATIVE MG/DL
LEUKOCYTE ESTERASE UR QL STRIP: ABNORMAL
LYMPHOCYTES NFR BLD: 1.3 K/UL (ref 1–4.8)
LYMPHOCYTES RELATIVE PERCENT: 27 % (ref 24–44)
MCH RBC QN AUTO: 29.7 PG (ref 26–34)
MCHC RBC AUTO-ENTMCNC: 32.5 G/DL (ref 31–37)
MCV RBC AUTO: 91.5 FL (ref 80–100)
MONOCYTES NFR BLD: 0.3 K/UL (ref 0.1–1.2)
MONOCYTES NFR BLD: 7 % (ref 2–11)
NEUTROPHILS NFR BLD: 63 % (ref 36–66)
NEUTS SEG NFR BLD: 3.1 K/UL (ref 1.8–7.7)
NITRITE UR QL STRIP: NEGATIVE
PH UR STRIP: 6 [PH] (ref 5–8)
PLATELET # BLD AUTO: 145 K/UL (ref 140–450)
PMV BLD AUTO: 10.2 FL (ref 6–12)
POTASSIUM SERPL-SCNC: 4.1 MMOL/L (ref 3.7–5.3)
PROT SERPL-MCNC: 6.1 G/DL (ref 6.6–8.7)
PROT UR STRIP-MCNC: NEGATIVE MG/DL
PROTHROMBIN TIME: 25 SEC (ref 11.8–14.6)
RBC # BLD AUTO: 4.49 M/UL (ref 4–5.2)
RBC #/AREA URNS HPF: ABNORMAL /HPF (ref 0–2)
SODIUM SERPL-SCNC: 139 MMOL/L (ref 136–145)
SODIUM UR-SCNC: 21 MMOL/L
SP GR UR STRIP: 1.01 (ref 1–1.03)
TOTAL PROTEIN, URINE: 11 MG/DL
UROBILINOGEN UR STRIP-ACNC: NORMAL EU/DL (ref 0–1)
WBC #/AREA URNS HPF: ABNORMAL /HPF (ref 0–5)
WBC OTHER # BLD: 4.9 K/UL (ref 3.5–11)

## 2025-07-20 PROCEDURE — 85025 COMPLETE CBC W/AUTO DIFF WBC: CPT

## 2025-07-20 PROCEDURE — 99222 1ST HOSP IP/OBS MODERATE 55: CPT | Performed by: INTERNAL MEDICINE

## 2025-07-20 PROCEDURE — 82436 ASSAY OF URINE CHLORIDE: CPT

## 2025-07-20 PROCEDURE — 84300 ASSAY OF URINE SODIUM: CPT

## 2025-07-20 PROCEDURE — 6360000002 HC RX W HCPCS: Performed by: FAMILY MEDICINE

## 2025-07-20 PROCEDURE — 2580000003 HC RX 258: Performed by: NURSE PRACTITIONER

## 2025-07-20 PROCEDURE — 6370000000 HC RX 637 (ALT 250 FOR IP): Performed by: FAMILY MEDICINE

## 2025-07-20 PROCEDURE — 82570 ASSAY OF URINE CREATININE: CPT

## 2025-07-20 PROCEDURE — 36415 COLL VENOUS BLD VENIPUNCTURE: CPT

## 2025-07-20 PROCEDURE — 99232 SBSQ HOSP IP/OBS MODERATE 35: CPT | Performed by: FAMILY MEDICINE

## 2025-07-20 PROCEDURE — 2500000003 HC RX 250 WO HCPCS: Performed by: FAMILY MEDICINE

## 2025-07-20 PROCEDURE — 6360000002 HC RX W HCPCS: Performed by: NURSE PRACTITIONER

## 2025-07-20 PROCEDURE — 81001 URINALYSIS AUTO W/SCOPE: CPT

## 2025-07-20 PROCEDURE — 85610 PROTHROMBIN TIME: CPT

## 2025-07-20 PROCEDURE — 80053 COMPREHEN METABOLIC PANEL: CPT

## 2025-07-20 PROCEDURE — 2060000000 HC ICU INTERMEDIATE R&B

## 2025-07-20 PROCEDURE — 84156 ASSAY OF PROTEIN URINE: CPT

## 2025-07-20 RX ORDER — METOPROLOL SUCCINATE 25 MG/1
25 TABLET, EXTENDED RELEASE ORAL DAILY
Status: DISCONTINUED | OUTPATIENT
Start: 2025-07-21 | End: 2025-07-20

## 2025-07-20 RX ORDER — WARFARIN SODIUM 1 MG/1
2 TABLET ORAL ONCE
Status: COMPLETED | OUTPATIENT
Start: 2025-07-20 | End: 2025-07-20

## 2025-07-20 RX ORDER — METOPROLOL SUCCINATE 25 MG/1
12.5 TABLET, EXTENDED RELEASE ORAL DAILY
Status: DISCONTINUED | OUTPATIENT
Start: 2025-07-21 | End: 2025-07-23 | Stop reason: HOSPADM

## 2025-07-20 RX ORDER — TORSEMIDE 20 MG/1
20 TABLET ORAL DAILY
Status: DISCONTINUED | OUTPATIENT
Start: 2025-07-21 | End: 2025-07-23 | Stop reason: HOSPADM

## 2025-07-20 RX ADMIN — METOPROLOL SUCCINATE 12.5 MG: 25 TABLET, EXTENDED RELEASE ORAL at 08:58

## 2025-07-20 RX ADMIN — SODIUM CHLORIDE, PRESERVATIVE FREE 10 ML: 5 INJECTION INTRAVENOUS at 08:59

## 2025-07-20 RX ADMIN — LEVOTHYROXINE SODIUM 50 MCG: 0.05 TABLET ORAL at 08:59

## 2025-07-20 RX ADMIN — DILTIAZEM HYDROCHLORIDE 2.5 MG/HR: 5 INJECTION INTRAVENOUS at 23:56

## 2025-07-20 RX ADMIN — FUROSEMIDE 20 MG: 10 INJECTION, SOLUTION INTRAMUSCULAR; INTRAVENOUS at 08:58

## 2025-07-20 RX ADMIN — WARFARIN SODIUM 2 MG: 1 TABLET ORAL at 08:59

## 2025-07-20 NOTE — PLAN OF CARE
Problem: Chronic Conditions and Co-morbidities  Goal: Patient's chronic conditions and co-morbidity symptoms are monitored and maintained or improved  7/20/2025 0804 by Ruth Vazquez RN  Outcome: Progressing  7/20/2025 0106 by Luis Munguia RN  Outcome: Progressing     Problem: Discharge Planning  Goal: Discharge to home or other facility with appropriate resources  7/20/2025 0106 by Luis Munguia RN  Outcome: Progressing     Problem: ABCDS Injury Assessment  Goal: Absence of physical injury  7/20/2025 0106 by Luis Munguia RN  Outcome: Progressing     Problem: Safety - Adult  Goal: Free from fall injury  7/20/2025 0106 by Luis Munguia RN  Outcome: Progressing

## 2025-07-20 NOTE — CARE COORDINATION
Case Management Assessment  Initial Evaluation    Date/Time of Evaluation: 7/20/2025 5:09 PM  Assessment Completed by: Cely Brown RN    If patient is discharged prior to next notation, then this note serves as note for discharge by case management.    Patient Name: Tracee Partida                   YOB: 1940  Diagnosis: Heart failure (HCC) [I50.9]  Bilateral pleural effusion [J90]  Acute on chronic systolic congestive heart failure (HCC) [I50.23]  Atrial fibrillation with rapid ventricular response (HCC) [I48.91]                   Date / Time: 7/19/2025 11:22 AM    Patient Admission Status: Inpatient   Readmission Risk (Low < 19, Mod (19-27), High > 27): Readmission Risk Score: 17.9    Current PCP: Lo Medina MD  PCP verified by CM? Yes    Chart Reviewed: Yes      History Provided by: Patient  Patient Orientation: Alert and Oriented, Person, Place, Situation    Patient Cognition: Alert    Hospitalization in the last 30 days (Readmission):  No    If yes, Readmission Assessment in CM Navigator will be completed.    Advance Directives:      Code Status: Full Code   Patient's Primary Decision Maker is: Named in Scanned ACP Document    Primary Decision Maker: SHAAN PARTIDA - Child - [phone]    Primary Decision Maker: MELISSA PARTIDA - Child - [phone]    Primary Decision Maker: LIBBY PARTIDA - Child - [phone]    Discharge Planning:    Patient lives with: Family Members Type of Home: House  Primary Care Giver: Self  Patient Support Systems include: Family Members   Current Financial resources: Medicare  Current community resources: None  Current services prior to admission: Durable Medical Equipment            Current DME: Shower Chair (grab bars in shower)            Type of Home Care services:  None    ADLS  Prior functional level: Assistance with the following:, Cooking, Housework, Shopping, Mobility  Current functional level: Assistance with the following:, Cooking, Housework,

## 2025-07-20 NOTE — PROGRESS NOTES
Pharmacy Note  Warfarin Consult follow-up      Recent Labs     07/20/25  0426   INR 2.1*     Recent Labs     07/19/25  1152 07/20/25  0426   HGB 13.8 13.4   HCT 41.9 41.1   * 145       Target INR range: 2-3    Current warfarin drug-drug interactions: no relevant new drug interactions     Date INR Dose   07/19 2.7 2 mg   7/20 2.1  2 mg               Notes:                   INR remains therapeutic. Proceed with home dose warfarin 2 mg today.  Daily PT/INR while inpatient.      Ashley Correa, PharmD  [facility]  7/20/2025 8:14 AM

## 2025-07-20 NOTE — PLAN OF CARE
Problem: Chronic Conditions and Co-morbidities  Goal: Patient's chronic conditions and co-morbidity symptoms are monitored and maintained or improved  7/20/2025 0106 by Luis Munguia RN  Outcome: Progressing  7/20/2025 0106 by Luis Munguia RN  Outcome: Progressing  7/19/2025 1642 by Silvia Figueroa RN  Outcome: Progressing  Flowsheets (Taken 7/19/2025 1530)  Care Plan - Patient's Chronic Conditions and Co-Morbidity Symptoms are Monitored and Maintained or Improved: Monitor and assess patient's chronic conditions and comorbid symptoms for stability, deterioration, or improvement     Problem: Discharge Planning  Goal: Discharge to home or other facility with appropriate resources  7/20/2025 0106 by Luis Munguia RN  Outcome: Progressing  7/20/2025 0106 by Luis Munguia RN  Outcome: Progressing  7/19/2025 1642 by Silvia Figueroa RN  Outcome: Progressing  Flowsheets (Taken 7/19/2025 1530)  Discharge to home or other facility with appropriate resources: Identify barriers to discharge with patient and caregiver     Problem: ABCDS Injury Assessment  Goal: Absence of physical injury  7/20/2025 0106 by Luis Munguia RN  Outcome: Progressing  7/20/2025 0106 by Luis Munguia RN  Outcome: Progressing  7/19/2025 1642 by Silvia Figueroa RN  Outcome: Progressing     Problem: Safety - Adult  Goal: Free from fall injury  7/20/2025 0106 by Luis Munguia RN  Outcome: Progressing  7/20/2025 0106 by Luis Munguia RN  Outcome: Progressing  7/19/2025 1642 by Silvia Figueroa RN  Outcome: Progressing

## 2025-07-20 NOTE — PROGRESS NOTES
[facility] Medicine  IN-PATIENT SERVICE   [facility] - Location: [place]    Progress Note    2025    11:23 AM    Name:   Tracee Riggins  MRN:     [id number]     Acct:      [id number]   Room:   [facility] Day:  1  Admit Date:  2025 11:22 AM    PCP:   Lo Medina MD  Code Status:  Full Code    Subjective:     She is feeling better today as far as her SOB.  She still feels weak.  No Cp.  No N/V.  She is eating ok and sleeping ok.      Medications:     Allergies:    Allergies   Allergen Reactions    Amiodarone Nausea And Vomiting       Current Meds:   Scheduled Meds:    [START ON 2025] torsemide  20 mg Oral Daily    sodium chloride flush  5-40 mL IntraVENous 2 times per day    levothyroxine  50 mcg Oral Daily    metoprolol succinate  12.5 mg Oral Daily    warfarin placeholder: dosing by pharmacy   Oral RX Placeholder     Continuous Infusions:    dilTIAZem 125 mg in sodium chloride 0.9 % 125 mL infusion 2.5 mg/hr (25 0304)    sodium chloride       PRN Meds: sodium chloride flush, sodium chloride, ondansetron **OR** ondansetron, polyethylene glycol, acetaminophen **OR** acetaminophen    Data:     Vitals:  BP (!) 103/52   Pulse 89   Temp 97.9 °F (36.6 °C) (Oral)   Resp 18   Ht 1.346 m (4' 5\")   Wt 43 kg (94 lb 12.8 oz)   SpO2 95%   BMI 23.73 kg/m²   Temp (24hrs), Av °F (36.7 °C), Min:97.5 °F (36.4 °C), Max:98.7 °F (37.1 °C)    No results for input(s): \"POCGLU\" in the last 72 hours.    I/O (24Hr):    Intake/Output Summary (Last 24 hours) at 2025 1123  Last data filed at 2025 1115  Gross per 24 hour   Intake 624.86 ml   Output 1800 ml   Net -1175.14 ml       Labs:  Hematology:  Recent Labs     25  1152 25  0426   WBC 6.3 4.9   RBC 4.59 4.49   HGB 13.8 13.4   HCT 41.9 41.1   MCV 91.3 91.5   MCH 30.1 29.7   MCHC 32.9 32.5   RDW 13.8 13.9   * 145   MPV 10.5 10.2   INR 2.7* 2.1*     Chemistry:  Recent Labs     25  1920

## 2025-07-20 NOTE — CONSULTS
Cardiology Consultation   Sally Diaz MD [facility]  Chencho Glass MD [facility]  Ronit Jaime, CNP      Reason for Consult: Atrial fibrillation and acute on chronic CHF exacerbation  Requesting Physician: Beverly Ventura DO    CHIEF COMPLAINT: Shortness of breath with lower extremity edema      HISTORY OF PRESENT ILLNESS:    This is an 84-year-old female with history of paroxysmal atrial fibrillation s/p electrical cardioversion in 9/2024, chronic systolic CHF with mild nonischemic cardiomyopathy due to valvular heart disease, s/p bioprosthetic mitral valve replacement in 1986 and repeat in 2009, hypothyroidism, chronic kidney disease, gout and generalized osteoarthritis presented to emergency room with complaints of worsening shortness of breath, increased lower extremity swelling with generalized weakness going on on for the past 1 week.  Patient states that she was visiting resume with her family and was not able to ambulate due to dyspnea, palpitations and lower extremity swelling.    Patient was noted to be in atrial fibrillation with rapid ventricular response.  Chest x-ray shows bilateral atelectasis with pleural effusion right greater than left.  She did receive IV furosemide and is placed on torsemide by nephrology, currently there is no lower extremity edema noted.    She was admitted in 9/2025 with similar complaints and was treated with rate control medications however required electrical cardioversion.  Patient has allergies to amiodarone and states that she did not tolerate metoprolol succinate 25 mg with complaints of nausea and abdominal discomfort therefore she has been on half a tablet of this medication.  Her heart rate is better controlled with IV diltiazem and we have suggested to possibly use diltiazem 60 mg twice a day instead of metoprolol if she tolerates it better.      Past Medical History:    Past Medical History:   Diagnosis Date    Chronic kidney disease     Gout

## 2025-07-20 NOTE — CONSULTS
exposure, No h/o prolonged NSAIDs use in the past, No h/o nephrolithiasis, No recent skin rashes or arthralgias, No hematuria or pyuria noticed in the recent past. Doesn't report any reduction in the urine output recently. Non report of any obstructive urinary symptoms (urgency, frequency, weak stream, straining while urination). No h/o recurrent UTIs in the past.    Past History/Allergies?Social History:     Past Medical History:   Diagnosis Date    Chronic kidney disease     Gout 12/2019    Gout 05/2020    Hypertension     Lumbar disc disease     Thrombocytopenia     Vitamin D deficiency        Past Surgical History:   Procedure Laterality Date    CABG WITH MITRAL VALVE REPLACEMENT      CATARACT REMOVAL          Allergies   Allergen Reactions    Amiodarone Nausea And Vomiting       Social History     Socioeconomic History    Marital status:      Spouse name: Not on file    Number of children: Not on file    Years of education: Not on file    Highest education level: Not on file   Occupational History    Not on file   Tobacco Use    Smoking status: Never    Smokeless tobacco: Never   Vaping Use    Vaping status: Never Used   Substance and Sexual Activity    Alcohol use: No    Drug use: No    Sexual activity: Defer   Other Topics Concern    Not on file   Social History Narrative    Not on file     Social Drivers of Health     Financial Resource Strain: Low Risk  (10/8/2024)    Overall Financial Resource Strain (CARDIA)     Difficulty of Paying Living Expenses: Not hard at all   Food Insecurity: No Food Insecurity (7/19/2025)    Hunger Vital Sign     Worried About Running Out of Food in the Last Year: Never true     Ran Out of Food in the Last Year: Never true   Transportation Needs: No Transportation Needs (7/19/2025)    PRAPARE - Transportation     Lack of Transportation (Medical): No     Lack of Transportation (Non-Medical): No   Physical Activity: Sufficiently Active (3/28/2024)    Exercise Vital Sign      Days of Exercise per Week: 5 days     Minutes of Exercise per Session: 30 min   Stress: Not on file   Social Connections: Not on file   Intimate Partner Violence: Unknown (2/22/2024)    Received from [facility]     Fear of Current or Ex-Partner: Not on file     Emotionally Abused: Not on file     Physically Abused: Not on file     Sexually Abused: Not on file     Physically or Sexually Abused: Not on file   Housing Stability: Low Risk  (7/19/2025)    Housing Stability Vital Sign     Unable to Pay for Housing in the Last Year: No     Number of Times Moved in the Last Year: 1     Homeless in the Last Year: No       Family History:        Family History   Problem Relation Age of Onset    Cancer Mother         breast Cancer 80's    Breast Cancer Mother     Cancer Father        Outpatient Medications:     Medications Prior to Admission: levothyroxine (SYNTHROID) 50 MCG tablet, TAKE 1 TABLET BY MOUTH EVERY DAY  furosemide (LASIX) 20 MG tablet, TAKE 1 TABLET BY MOUTH 3 TIMES A WEEK  empagliflozin (JARDIANCE) 10 MG tablet, Take 1 tablet by mouth daily  metoprolol succinate (TOPROL XL) 25 MG extended release tablet, Take 0.5 tablets by mouth daily  warfarin (COUMADIN) 1 MG tablet, Take 2 tablets by mouth daily 1 to 2 mg daily    Current Medications:     Scheduled Meds:    sodium chloride flush  5-40 mL IntraVENous 2 times per day    furosemide  20 mg IntraVENous Daily    levothyroxine  50 mcg Oral Daily    metoprolol succinate  12.5 mg Oral Daily    warfarin placeholder: dosing by pharmacy   Oral RX Placeholder     Continuous Infusions:    dilTIAZem 125 mg in sodium chloride 0.9 % 125 mL infusion 2.5 mg/hr (07/20/25 0304)    sodium chloride       PRN Meds:  sodium chloride flush, sodium chloride, ondansetron **OR** ondansetron, polyethylene glycol, acetaminophen **OR** acetaminophen    Review of Systems:     Constitutional: No fever, no chills, no lethargy,  no weakness.  HEENT:  No headache, otalgia, itchy eyes, nasal discharge or sore throat.  Cardiac:  No chest pain, positive dyspnea, orthopnea or PND.  Chest:   No cough, phlegm or wheezing.  Abdomen:  No abdominal pain, nausea or vomiting.  Neuro:  No focal weakness, abnormal movements or seizure like activity.  Skin:   No rashes, no itching.  :   No hematuria, no pyuria, no dysuria, no flank pain.  Extremities:  No swelling or joint pains.  ROS was otherwise negative except as mentioned in the [place].     Input/Output:       I/O last 3 completed shifts:  In: 624.9 [P.O.:600; I.V.:24.9]  Out: 1600 [Urine:1600]    Vital Signs:   Temperature:  Temp: 97.8 °F (36.6 °C)  TMax:   Temp (24hrs), Av °F (36.7 °C), Min:97.5 °F (36.4 °C), Max:98.7 °F (37.1 °C)    Respirations:  Respirations: 21  Pulse:   Pulse: 85  BP:    BP: (!) 120/59  BP Range: Systolic (24hrs), Av , Min:91 , Max:139       Diastolic (24hrs), Av, Min:50, Max:93      Physical Examination:     General:  AAO x 3, speaking in full sentences, no accessory muscle use.  HEENT: Atraumatic, normocephalic, no throat congestion, moist mucosa.  Eyes:   Pupils equal, round and reactive to light, EOMI.  Neck:   Supple  Chest:   Bilateral diminished breath sounds, no rales or wheezes.  Cardiac:  S1 S2 RR, no murmurs, gallops or rubs.  Abdomen: Soft, non-tender, no masses or organomegaly, BS audible.  :   No suprapubic or flank tenderness.  Neuro:   AAO x 3, No FND.  SKIN:  No rashes, good skin turgor.  Extremities:  Trace edema.    Labs:       Recent Labs     25  1152 25  0426   WBC 6.3 4.9   RBC 4.59 4.49   HGB 13.8 13.4   HCT 41.9 41.1   MCV 91.3 91.5   MCH 30.1 29.7   MCHC 32.9 32.5   RDW 13.8 13.9   * 145   MPV 10.5 10.2      BMP:   Recent Labs     25  1152 25  0426   * 139   K 4.5 4.1   CL 99 101   CO2 19* 27   BUN 69* 68*   CREATININE 1.8* 1.9*   GLUCOSE 119* 110*   CALCIUM 8.7 8.9     Magnesium:    Recent Labs

## 2025-07-21 ENCOUNTER — APPOINTMENT (OUTPATIENT)
Dept: GENERAL RADIOLOGY | Age: 85
DRG: 291 | End: 2025-07-21
Payer: MEDICARE

## 2025-07-21 ENCOUNTER — APPOINTMENT (OUTPATIENT)
Age: 85
DRG: 291 | End: 2025-07-21
Attending: FAMILY MEDICINE
Payer: MEDICARE

## 2025-07-21 LAB
ANION GAP SERPL CALCULATED.3IONS-SCNC: 9 MMOL/L (ref 9–16)
BUN SERPL-MCNC: 54 MG/DL (ref 8–23)
CALCIUM SERPL-MCNC: 8.5 MG/DL (ref 8.6–10.4)
CHLORIDE SERPL-SCNC: 102 MMOL/L (ref 98–107)
CO2 SERPL-SCNC: 27 MMOL/L (ref 20–31)
CREAT SERPL-MCNC: 1.5 MG/DL (ref 0.6–0.9)
ECHO AO ROOT DIAM: 3.2 CM
ECHO AO ROOT INDEX: 2.58 CM/M2
ECHO AR MAX VEL PISA: 4.2 M/S
ECHO AV AREA PEAK VELOCITY: 2.7 CM2
ECHO AV AREA VTI: 2.6 CM2
ECHO AV AREA/BSA PEAK VELOCITY: 2.2 CM2/M2
ECHO AV AREA/BSA VTI: 2.1 CM2/M2
ECHO AV MEAN GRADIENT: 6 MMHG
ECHO AV MEAN VELOCITY: 1.1 M/S
ECHO AV PEAK GRADIENT: 10 MMHG
ECHO AV PEAK VELOCITY: 1.6 M/S
ECHO AV REGURGITANT PHT: 366 MS
ECHO AV VELOCITY RATIO: 0.94
ECHO AV VTI: 27.8 CM
ECHO BSA: 1.26 M2
ECHO EST RA PRESSURE: 15 MMHG
ECHO IVC PROX: 3.3 CM
ECHO LA AREA 2C: 36.3 CM2
ECHO LA AREA 4C: 31 CM2
ECHO LA DIAMETER INDEX: 4.35 CM/M2
ECHO LA DIAMETER: 5.4 CM
ECHO LA MAJOR AXIS: 7.3 CM
ECHO LA MINOR AXIS: 6.7 CM
ECHO LA TO AORTIC ROOT RATIO: 1.69
ECHO LA VOL BP: 137 ML (ref 22–52)
ECHO LA VOL MOD A2C: 162 ML (ref 22–52)
ECHO LA VOL MOD A4C: 107 ML (ref 22–52)
ECHO LA VOL/BSA BIPLANE: 110 ML/M2 (ref 16–34)
ECHO LA VOLUME INDEX MOD A2C: 131 ML/M2 (ref 16–34)
ECHO LA VOLUME INDEX MOD A4C: 86 ML/M2 (ref 16–34)
ECHO LV EDV A2C: 37 ML
ECHO LV EDV A4C: 66 ML
ECHO LV EDV INDEX A4C: 53 ML/M2
ECHO LV EDV NDEX A2C: 30 ML/M2
ECHO LV EF PHYSICIAN: 45 %
ECHO LV EJECTION FRACTION A2C: 52 %
ECHO LV EJECTION FRACTION A4C: 54 %
ECHO LV EJECTION FRACTION BIPLANE: 54 % (ref 55–100)
ECHO LV ESV A2C: 18 ML
ECHO LV ESV A4C: 30 ML
ECHO LV ESV INDEX A2C: 15 ML/M2
ECHO LV ESV INDEX A4C: 24 ML/M2
ECHO LV FRACTIONAL SHORTENING: 27 % (ref 28–44)
ECHO LV INTERNAL DIMENSION DIASTOLE INDEX: 2.1 CM/M2
ECHO LV INTERNAL DIMENSION DIASTOLIC: 2.6 CM (ref 3.9–5.3)
ECHO LV INTERNAL DIMENSION SYSTOLIC INDEX: 1.53 CM/M2
ECHO LV INTERNAL DIMENSION SYSTOLIC: 1.9 CM
ECHO LV IVSD: 1.1 CM (ref 0.6–0.9)
ECHO LV MASS 2D: 96.3 G (ref 67–162)
ECHO LV MASS INDEX 2D: 77.7 G/M2 (ref 43–95)
ECHO LV POSTERIOR WALL DIASTOLIC: 1.4 CM (ref 0.6–0.9)
ECHO LV RELATIVE WALL THICKNESS RATIO: 1.08
ECHO LVOT AREA: 2.8 CM2
ECHO LVOT AV VTI INDEX: 0.91
ECHO LVOT DIAM: 1.9 CM
ECHO LVOT MEAN GRADIENT: 4 MMHG
ECHO LVOT PEAK GRADIENT: 9 MMHG
ECHO LVOT PEAK VELOCITY: 1.5 M/S
ECHO LVOT STROKE VOLUME INDEX: 58 ML/M2
ECHO LVOT SV: 72 ML
ECHO LVOT VTI: 25.4 CM
ECHO MV AREA VTI: 1.1 CM2
ECHO MV E DECELERATION TIME (DT): 280 MS
ECHO MV E VELOCITY: 2.55 M/S
ECHO MV LVOT VTI INDEX: 2.6
ECHO MV MAX VELOCITY: 2.7 M/S
ECHO MV MEAN GRADIENT: 14 MMHG
ECHO MV MEAN VELOCITY: 1.7 M/S
ECHO MV PEAK GRADIENT: 28 MMHG
ECHO MV REGURGITANT PEAK GRADIENT: 77 MMHG
ECHO MV REGURGITANT PEAK VELOCITY: 4.4 M/S
ECHO MV VTI: 66.1 CM
ECHO PV MAX VELOCITY: 0.8 M/S
ECHO PV PEAK GRADIENT: 3 MMHG
ECHO RA AREA 4C: 19.4 CM2
ECHO RA END SYSTOLIC VOLUME APICAL 4 CHAMBER INDEX BSA: 49 ML/M2
ECHO RA VOLUME: 61 ML
ECHO RIGHT VENTRICULAR SYSTOLIC PRESSURE (RVSP): 66 MMHG
ECHO RV BASAL DIMENSION: 4.8 CM
ECHO RV FREE WALL PEAK S': 7.9 CM/S
ECHO RV TAPSE: 1.2 CM (ref 1.7–?)
ECHO TV PEAK GRADIENT: 4 MMHG
ECHO TV REGURGITANT MAX VELOCITY: 3.56 M/S
ECHO TV REGURGITANT PEAK GRADIENT: 51 MMHG
EKG ATRIAL RATE: 136 BPM
EKG Q-T INTERVAL: 320 MS
EKG QRS DURATION: 78 MS
EKG QTC CALCULATION (BAZETT): 492 MS
EKG R AXIS: 52 DEGREES
EKG T AXIS: 151 DEGREES
EKG VENTRICULAR RATE: 142 BPM
ERYTHROCYTE [DISTWIDTH] IN BLOOD BY AUTOMATED COUNT: 13.9 % (ref 12.5–15.4)
GFR, ESTIMATED: 34 ML/MIN/1.73M2
GLUCOSE SERPL-MCNC: 98 MG/DL (ref 74–99)
HCT VFR BLD AUTO: 40.3 % (ref 36–46)
HGB BLD-MCNC: 13.3 G/DL (ref 12–16)
INR PPP: 2.6 (ref 0.9–1.2)
MAGNESIUM SERPL-MCNC: 2.5 MG/DL (ref 1.6–2.4)
MCH RBC QN AUTO: 30 PG (ref 26–34)
MCHC RBC AUTO-ENTMCNC: 33.1 G/DL (ref 31–37)
MCV RBC AUTO: 90.8 FL (ref 80–100)
PLATELET # BLD AUTO: 124 K/UL (ref 140–450)
PMV BLD AUTO: 10.1 FL (ref 6–12)
POTASSIUM SERPL-SCNC: 3.7 MMOL/L (ref 3.7–5.3)
PROTHROMBIN TIME: 28.8 SEC (ref 11.8–14.6)
RBC # BLD AUTO: 4.44 M/UL (ref 4–5.2)
SODIUM SERPL-SCNC: 138 MMOL/L (ref 136–145)
WBC OTHER # BLD: 4.6 K/UL (ref 3.5–11)

## 2025-07-21 PROCEDURE — 6370000000 HC RX 637 (ALT 250 FOR IP): Performed by: INTERNAL MEDICINE

## 2025-07-21 PROCEDURE — 80048 BASIC METABOLIC PNL TOTAL CA: CPT

## 2025-07-21 PROCEDURE — 93306 TTE W/DOPPLER COMPLETE: CPT

## 2025-07-21 PROCEDURE — 93010 ELECTROCARDIOGRAM REPORT: CPT | Performed by: INTERNAL MEDICINE

## 2025-07-21 PROCEDURE — 85027 COMPLETE CBC AUTOMATED: CPT

## 2025-07-21 PROCEDURE — 36415 COLL VENOUS BLD VENIPUNCTURE: CPT

## 2025-07-21 PROCEDURE — 97161 PT EVAL LOW COMPLEX 20 MIN: CPT

## 2025-07-21 PROCEDURE — 85610 PROTHROMBIN TIME: CPT

## 2025-07-21 PROCEDURE — 99232 SBSQ HOSP IP/OBS MODERATE 35: CPT | Performed by: INTERNAL MEDICINE

## 2025-07-21 PROCEDURE — 2580000003 HC RX 258: Performed by: NURSE PRACTITIONER

## 2025-07-21 PROCEDURE — 2500000003 HC RX 250 WO HCPCS: Performed by: FAMILY MEDICINE

## 2025-07-21 PROCEDURE — 97116 GAIT TRAINING THERAPY: CPT

## 2025-07-21 PROCEDURE — 2060000000 HC ICU INTERMEDIATE R&B

## 2025-07-21 PROCEDURE — 97535 SELF CARE MNGMENT TRAINING: CPT

## 2025-07-21 PROCEDURE — 97165 OT EVAL LOW COMPLEX 30 MIN: CPT

## 2025-07-21 PROCEDURE — 83735 ASSAY OF MAGNESIUM: CPT

## 2025-07-21 PROCEDURE — 93306 TTE W/DOPPLER COMPLETE: CPT | Performed by: INTERNAL MEDICINE

## 2025-07-21 PROCEDURE — 6370000000 HC RX 637 (ALT 250 FOR IP): Performed by: FAMILY MEDICINE

## 2025-07-21 PROCEDURE — 6360000002 HC RX W HCPCS: Performed by: NURSE PRACTITIONER

## 2025-07-21 PROCEDURE — 71045 X-RAY EXAM CHEST 1 VIEW: CPT

## 2025-07-21 PROCEDURE — 99232 SBSQ HOSP IP/OBS MODERATE 35: CPT | Performed by: FAMILY MEDICINE

## 2025-07-21 PROCEDURE — 93005 ELECTROCARDIOGRAM TRACING: CPT | Performed by: INTERNAL MEDICINE

## 2025-07-21 PROCEDURE — 6360000002 HC RX W HCPCS: Performed by: INTERNAL MEDICINE

## 2025-07-21 RX ORDER — AMIODARONE HYDROCHLORIDE 200 MG/1
200 TABLET ORAL 2 TIMES DAILY
Status: DISCONTINUED | OUTPATIENT
Start: 2025-07-21 | End: 2025-07-23 | Stop reason: HOSPADM

## 2025-07-21 RX ORDER — DIGOXIN 0.25 MG/ML
250 INJECTION INTRAMUSCULAR; INTRAVENOUS ONCE
Status: COMPLETED | OUTPATIENT
Start: 2025-07-21 | End: 2025-07-21

## 2025-07-21 RX ORDER — WARFARIN SODIUM 1 MG/1
1 TABLET ORAL
Status: COMPLETED | OUTPATIENT
Start: 2025-07-21 | End: 2025-07-21

## 2025-07-21 RX ADMIN — METOPROLOL SUCCINATE 12.5 MG: 25 TABLET, EXTENDED RELEASE ORAL at 07:56

## 2025-07-21 RX ADMIN — LEVOTHYROXINE SODIUM 50 MCG: 0.05 TABLET ORAL at 07:56

## 2025-07-21 RX ADMIN — WARFARIN SODIUM 1 MG: 1 TABLET ORAL at 17:29

## 2025-07-21 RX ADMIN — DILTIAZEM HYDROCHLORIDE 5 MG/HR: 5 INJECTION INTRAVENOUS at 08:24

## 2025-07-21 RX ADMIN — TORSEMIDE 20 MG: 20 TABLET ORAL at 07:56

## 2025-07-21 RX ADMIN — AMIODARONE HYDROCHLORIDE 200 MG: 200 TABLET ORAL at 20:59

## 2025-07-21 RX ADMIN — SODIUM CHLORIDE, PRESERVATIVE FREE 10 ML: 5 INJECTION INTRAVENOUS at 20:39

## 2025-07-21 RX ADMIN — DIGOXIN 250 MCG: 0.25 INJECTION INTRAMUSCULAR; INTRAVENOUS at 21:00

## 2025-07-21 NOTE — PROGRESS NOTES
[facility] Medicine  IN-PATIENT SERVICE   [facility] - Location: [place]    Progress Note    2025    7:38 AM    Name:   Tracee Riggins  MRN:     [id number]     Acct:      [id number]   Room:   [facility]-01   Day:  2  Admit Date:  2025 11:22 AM    PCP:   Lo Medina MD  Code Status:  Full Code    Subjective:     Patient stable through the night no orthopnea no nausea no vomiting no palpitations.  Repeat chest x-ray pending for this morning.  Electrolytes normal this morning creatinine is down to 1.5 magnesium 2.5.  Sugar and CBC normal.  INR 2.6  Medications:     Allergies:    Allergies   Allergen Reactions    Amiodarone Nausea And Vomiting       Current Meds:   Scheduled Meds:    torsemide  20 mg Oral Daily    metoprolol succinate  12.5 mg Oral Daily    sodium chloride flush  5-40 mL IntraVENous 2 times per day    levothyroxine  50 mcg Oral Daily    warfarin placeholder: dosing by pharmacy   Oral RX Placeholder     Continuous Infusions:    dilTIAZem 125 mg in sodium chloride 0.9 % 125 mL infusion 2.5 mg/hr (25 8956)    sodium chloride       PRN Meds: sodium chloride flush, sodium chloride, ondansetron **OR** ondansetron, polyethylene glycol, acetaminophen **OR** acetaminophen    Data:     Vitals: Signs are stable.  BP (!) 127/59   Pulse 95   Temp 98 °F (36.7 °C) (Axillary)   Resp 16   Ht 1.346 m (4' 5\")   Wt 43 kg (94 lb 12.8 oz)   SpO2 95%   BMI 23.73 kg/m²   Temp (24hrs), Av.9 °F (36.6 °C), Min:97.7 °F (36.5 °C), Max:98.1 °F (36.7 °C)    No results for input(s): \"POCGLU\" in the last 72 hours.    I/O (24Hr):    Intake/Output Summary (Last 24 hours) at 2025 0738  Last data filed at 2025 0430  Gross per 24 hour   Intake --   Output 1150 ml   Net -1150 ml       Labs:  Hematology:  Recent Labs     25  1152 25  0426 25  0454   WBC 6.3 4.9 4.6   RBC 4.59 4.49 4.44   HGB 13.8 13.4 13.3   HCT 41.9 41.1 40.3   MCV 91.3 91.5 90.8   MCH 30.1

## 2025-07-21 NOTE — PROGRESS NOTES
Physical Therapy  Facility/Department: [address]   Physical Therapy Initial Evaluation    Patient Name: Tracee Riggins        MRN: [id number]    : 1940    Date of Service: 2025    Chief Complaint   Patient presents with    Extremity Weakness     Patient c/o increased lower extremity weakness and lower leg swelling for about a week now. [place] and patients [place]. Patient has hx of afib.     Leg Swelling     Past Medical History:  has a past medical history of Chronic kidney disease, Gout, Gout, Hypertension, Lumbar disc disease, Thrombocytopenia, and Vitamin D deficiency.  Past Surgical History:  has a past surgical history that includes CABG with Mitral Valve Replacement and Cataract removal.    Discharge Recommendations  Discharge Recommendations: Therapy recommended at discharge  PT Equipment Recommendations  Equipment Needed: No  Other: has rollator    Assessment  Body Structures, Functions, Activity Limitations Requiring Skilled Therapeutic Intervention: Decreased functional mobility   Assessment: The patient was admitted due to SOB. At baseline, the patient lives with her son and requires no assistance with ADLs and functional mobility. She and her son share IADLs. The patient resides in a 1 story home with a ramped entry. The patient ambulates with no device at baseline. During session, the patient was able to perform bed mobility with modified independence, transfers with SBA and RW, and ambulate 150ft with RW and SBA. The patient would benefit from acute PT to improve functional independence back to baseline. The patient would be appropriate to return to prior living arrangements with prior assistance after discharge.  Therapy Prognosis: Good  Decision Making: Low Complexity  Requires PT Follow-Up: Yes  Activity Tolerance  Activity Tolerance: Patient tolerated evaluation without incident  Safety Devices  Type of Devices: Call light within reach, Chair alarm in place,

## 2025-07-21 NOTE — PROGRESS NOTES
Pharmacy Note  Warfarin Consult follow-up      Recent Labs     07/21/25  0454   INR 2.6*     Recent Labs     07/19/25  1152 07/20/25  0426 07/21/25  0454   HGB 13.8 13.4 13.3   HCT 41.9 41.1 40.3   * 145 124*       Current warfarin drug-drug interactions:  No new interactions    Date INR Dose   7/19 2.7 2 mg   7/20 2.1 2 mg   7/21 2.6 1 mg       Notes:                   -INR therapeutic  -Continue home regimen of 1 mg today  Daily PT/INR while inpatient.     Brielle Bonilla, PharmD 7/21/2025 8:30 AM

## 2025-07-21 NOTE — PROGRESS NOTES
Cardiology Progress Note                       Date:   7/21/2025  Patient name: Tracee Riggins  Date of admission:  7/19/2025 11:22 AM  MRN:   [id number]  YOB: 1940  PCP: Lo Medina MD    Reason for Admission:  atrial fib with rvr, acute on chronic CHF     Subjective:       Patients heart rate is now well controlled, cardizem drip is off, She is feeling better post diuresis and on room air. No chest pain.     Scheduled Meds:   warfarin  1 mg Oral Once    torsemide  20 mg Oral Daily    metoprolol succinate  12.5 mg Oral Daily    sodium chloride flush  5-40 mL IntraVENous 2 times per day    levothyroxine  50 mcg Oral Daily    warfarin placeholder: dosing by pharmacy   Oral RX Placeholder       Continuous Infusions:   dilTIAZem 125 mg in sodium chloride 0.9 % 125 mL infusion Stopped (07/21/25 1133)    sodium chloride         Labs:     CBC:   Recent Labs     07/19/25  1152 07/20/25  0426 07/21/25  0454   WBC 6.3 4.9 4.6   HGB 13.8 13.4 13.3   * 145 124*     BMP:    Recent Labs     07/19/25  1152 07/20/25  0426 07/21/25  0454   * 139 138   K 4.5 4.1 3.7   CL 99 101 102   CO2 19* 27 27   BUN 69* 68* 54*   CREATININE 1.8* 1.9* 1.5*   GLUCOSE 119* 110* 98     Hepatic:   Recent Labs     07/20/25  0426   AST 42*   ALT 42*   BILITOT 0.8   ALKPHOS 143*     Troponin: No results for input(s): \"TROPONINI\" in the last 72 hours.  BNP: No results for input(s): \"BNP\" in the last 72 hours.  Lipids: No results for input(s): \"CHOL\", \"HDL\" in the last 72 hours.    Invalid input(s): \"LDLCALCU\"  INR:   Recent Labs     07/19/25  1152 07/20/25  0426 07/21/25  0454   INR 2.7* 2.1* 2.6*         Objective:     Vitals: /71   Pulse 80   Temp 97.2 °F (36.2 °C) (Oral)   Resp 21   Ht 1.346 m (4' 5\")   Wt 42.6 kg (94 lb)   SpO2 97%   BMI 23.53 kg/m²     General appearance: awake, alert, in no apparent respiratory distress on room air   HEENT: Head: Normocephalic, no lesions, without

## 2025-07-21 NOTE — PROGRESS NOTES
Occupational Therapy  Occupational Therapy Initial Evaluation  Facility/Department: [address]   Patient Name: Tracee Riggins        MRN: [id number]    : 1940    Date of Service: 2025    Chief Complaint   Patient presents with    Extremity Weakness     Patient c/o increased lower extremity weakness and lower leg swelling for about a week now. [place]. Patient has hx of afib.     Leg Swelling     Past Medical History:  has a past medical history of Chronic kidney disease, Gout, Gout, Hypertension, Lumbar disc disease, Thrombocytopenia, and Vitamin D deficiency.  Past Surgical History:  has a past surgical history that includes CABG with Mitral Valve Replacement and Cataract removal.    Discharge Recommendations  Pt will likely not be in need of OT services following discharge  OT Equipment Recommendations  Other: AE/DME recommendations TBD    Assessment  Performance deficits / Impairments: Decreased functional mobility ;Decreased endurance;Decreased balance;Decreased ADL status    Assessment: Pt seen for therapy eval s/p admission with acute on chronic clinical systolic heart failure. PLOF includes living with son, typically IND all ADLs and majority of IADLs with no use of AE/DME for mobility. At this time, pt is MOD I bed mobility, SBA sit<>stand and functional mobility with no device, SBA toileting, SUP standing grooming and appears to be safe to return to PLOF with assist as needed. Pt to benefit from continued therapy services while hospitalized however will likely not be in need of OT services following discharge.    Prognosis: Good  Decision Making: Low Complexity  REQUIRES OT FOLLOW-UP: Yes    Safety Devices  Type of Devices: Call light within reach, Chair alarm in place, Gait belt, Left in chair, Nurse notified    AM-PAC  AM-PAC Daily Activity - Inpatient   How much help is needed for putting on and taking off regular lower body clothing?: A Little  How

## 2025-07-21 NOTE — PROGRESS NOTES
Renal Progress Note    Patient :  Tracee Riggins; 84 y.o. MRN# [id number]  Location:  343/343-01  Attending:  Beverly Ventura DO  Admit Date:  7/19/2025   Hospital Day: 2      Subjective:     Oral intake good.  Urine output excellent, responded well to IV Lasix.  Shortness of breath better.  Denies any nausea or vomiting.  Hemodynamically stable.  No fever or chills.  Appetite good.  Creatinine is down to 1.5 which is close to her baseline.  Diuretics have been switched to oral.  Labs today show sodium 138 potassium 3.7 chloride 102 bicarb 27 BUN 54 creatinine 1.5 magnesium 2.5 calcium 8.5 albumin 3.7, hemoglobin 13.3 white count 4.6 platelets 124    History reviewed  Known history of chronic kidney disease stage IIIb baseline creatinine 1.4-1.6 follows up with Deb Reza in the office.  She had bilateral small kidneys right measuring 7.7 left 7.3 cm.  She also has known history of valvular heart disease has had bioprosthetic mitral valve replaced, pulmonary hypertension and tricuspid regurgitation.  She came into the hospital with worsening shortness of breath and not feeling well for about a week or so.  On presentation to the ER she was found to be in A-fib with RVR and was somewhat hypotensive.  She was given loop diuretics.  Creatinine went up to 1.9 nephrology was consulted.  Chest x-ray was unremarkable except for bibasilar atelectasis.  With restoration of intrarenal hemodynamics, treatment of A-fib renal function has improved creatinine is down to 1.5.  Outpatient Medications:     Medications Prior to Admission: levothyroxine (SYNTHROID) 50 MCG tablet, TAKE 1 TABLET BY MOUTH EVERY DAY  furosemide (LASIX) 20 MG tablet, TAKE 1 TABLET BY MOUTH 3 TIMES A WEEK  empagliflozin (JARDIANCE) 10 MG tablet, Take 1 tablet by mouth daily  metoprolol succinate (TOPROL XL) 25 MG extended release tablet, Take 0.5 tablets by mouth daily  warfarin (COUMADIN) 1 MG tablet, Take 2 tablets by mouth daily 1 to 2 mg  6.3 4.9 4.6   RBC 4.59 4.49 4.44   HGB 13.8 13.4 13.3   HCT 41.9 41.1 40.3   MCV 91.3 91.5 90.8   MCH 30.1 29.7 30.0   MCHC 32.9 32.5 33.1   RDW 13.8 13.9 13.9   * 145 124*   MPV 10.5 10.2 10.1      BMP:   Recent Labs     07/19/25  1152 07/20/25  0426 07/21/25  0454   * 139 138   K 4.5 4.1 3.7   CL 99 101 102   CO2 19* 27 27   BUN 69* 68* 54*   CREATININE 1.8* 1.9* 1.5*   GLUCOSE 119* 110* 98   CALCIUM 8.7 8.9 8.5*      Phosphorus:   No results for input(s): \"PHOS\" in the last 72 hours.  Magnesium:    Recent Labs     07/19/25  1152 07/21/25  0454   MG 2.7* 2.5*     Albumin:  No results for input(s): \"LABALBU\" in the last 72 hours.  BNP:    No results found for: \"BNP\"  YASMEEN:    No results found for: \"YASMEEN\"  SPEP:No results found for: \"ALBCAL\", \"ALBPCT\", \"LABALPH\", \"A1PCT\", \"A2PCT\", \"LABBETA\", \"BETAPCT\", \"GAMGLOB\", \"GGPCT\", \"PATH\"  UPEP:   No results found for: \"LABPE\"  C3:   No results found for: \"C3\"  C4:   No results found for: \"C4\"  MPO ANCA:   No results found for: \"MPO\"  PR3 ANCA:   No results found for: \"PR3\"  Anti-GBM:   No results found for: \"GBMABIGG\"  Hep BsAg:       No results found for: \"HEPBSAG\"  Hep C AB:        No results found for: \"HEPCAB\"    Urinalysis/Chemistries:      Lab Results   Component Value Date/Time    NITRU NEGATIVE 07/20/2025 09:30 AM    COLORU Yellow 07/20/2025 09:30 AM    PHUR 6.0 07/20/2025 09:30 AM    WBCUA 2 TO 5 07/20/2025 09:30 AM    RBCUA 2 TO 5 07/20/2025 09:30 AM    BACTERIA FEW 07/20/2025 09:30 AM    LEUKOCYTESUR TRACE 07/20/2025 09:30 AM    UROBILINOGEN Normal 07/20/2025 09:30 AM    BILIRUBINUR NEGATIVE 07/20/2025 09:30 AM    GLUCOSEU 2+ 07/20/2025 09:30 AM    KETUA NEGATIVE 07/20/2025 09:30 AM     Urine Sodium:   No components found for: \"GWEN\"  Urine Potassium:  No results found for: \"KUR\"  Urine Chloride:    Lab Results   Component Value Date/Time    CLUR <20 07/20/2025 09:30 AM     Urine Osmolarity: No results found for: \"OSMOU\"  Urine Protein:   No components

## 2025-07-21 NOTE — PLAN OF CARE
Problem: Chronic Conditions and Co-morbidities  Goal: Patient's chronic conditions and co-morbidity symptoms are monitored and maintained or improved  7/21/2025 1459 by Silvia Vanegas RN  Outcome: Progressing  7/21/2025 0122 by Luis Munguia RN  Outcome: Progressing     Problem: Discharge Planning  Goal: Discharge to home or other facility with appropriate resources  7/21/2025 1459 by Silvia Vanegas RN  Outcome: Progressing  7/21/2025 0122 by Luis Munguia RN  Outcome: Progressing     Problem: ABCDS Injury Assessment  Goal: Absence of physical injury  7/21/2025 1459 by Silvia Vanegas RN  Outcome: Progressing  7/21/2025 0122 by Luis Munguia RN  Outcome: Progressing     Problem: Safety - Adult  Goal: Free from fall injury  7/21/2025 1459 by Silvia Vanegas RN  Outcome: Progressing  7/21/2025 0122 by Luis Munguia RN  Outcome: Progressing

## 2025-07-22 LAB
ALBUMIN SERPL-MCNC: 3.1 G/DL (ref 3.5–5.2)
ALBUMIN/GLOB SERPL: 1.6 {RATIO} (ref 1–2.5)
ALP SERPL-CCNC: 109 U/L (ref 35–104)
ALT SERPL-CCNC: 24 U/L (ref 10–35)
ANION GAP SERPL CALCULATED.3IONS-SCNC: 9 MMOL/L (ref 9–16)
AST SERPL-CCNC: 26 U/L (ref 10–35)
BILIRUB SERPL-MCNC: 0.7 MG/DL (ref 0–1.2)
BUN SERPL-MCNC: 49 MG/DL (ref 8–23)
CALCIUM SERPL-MCNC: 8.1 MG/DL (ref 8.6–10.4)
CHLORIDE SERPL-SCNC: 103 MMOL/L (ref 98–107)
CO2 SERPL-SCNC: 28 MMOL/L (ref 20–31)
CREAT SERPL-MCNC: 1.6 MG/DL (ref 0.6–0.9)
EKG ATRIAL RATE: 120 BPM
EKG P-R INTERVAL: 216 MS
EKG Q-T INTERVAL: 328 MS
EKG QRS DURATION: 82 MS
EKG QTC CALCULATION (BAZETT): 463 MS
EKG R AXIS: 11 DEGREES
EKG T AXIS: 111 DEGREES
EKG VENTRICULAR RATE: 120 BPM
GFR, ESTIMATED: 31 ML/MIN/1.73M2
GLUCOSE SERPL-MCNC: 89 MG/DL (ref 74–99)
INR PPP: 2.4 (ref 0.9–1.2)
MAGNESIUM SERPL-MCNC: 2.3 MG/DL (ref 1.6–2.4)
POTASSIUM SERPL-SCNC: 3.9 MMOL/L (ref 3.7–5.3)
PROT SERPL-MCNC: 5.1 G/DL (ref 6.6–8.7)
PROTHROMBIN TIME: 27 SEC (ref 11.8–14.6)
SODIUM SERPL-SCNC: 140 MMOL/L (ref 136–145)

## 2025-07-22 PROCEDURE — 2060000000 HC ICU INTERMEDIATE R&B

## 2025-07-22 PROCEDURE — 6370000000 HC RX 637 (ALT 250 FOR IP): Performed by: INTERNAL MEDICINE

## 2025-07-22 PROCEDURE — 99232 SBSQ HOSP IP/OBS MODERATE 35: CPT | Performed by: FAMILY MEDICINE

## 2025-07-22 PROCEDURE — 36415 COLL VENOUS BLD VENIPUNCTURE: CPT

## 2025-07-22 PROCEDURE — 2500000003 HC RX 250 WO HCPCS: Performed by: FAMILY MEDICINE

## 2025-07-22 PROCEDURE — 80053 COMPREHEN METABOLIC PANEL: CPT

## 2025-07-22 PROCEDURE — 99232 SBSQ HOSP IP/OBS MODERATE 35: CPT | Performed by: INTERNAL MEDICINE

## 2025-07-22 PROCEDURE — 83735 ASSAY OF MAGNESIUM: CPT

## 2025-07-22 PROCEDURE — 85610 PROTHROMBIN TIME: CPT

## 2025-07-22 PROCEDURE — 93010 ELECTROCARDIOGRAM REPORT: CPT | Performed by: INTERNAL MEDICINE

## 2025-07-22 PROCEDURE — 6370000000 HC RX 637 (ALT 250 FOR IP): Performed by: FAMILY MEDICINE

## 2025-07-22 RX ORDER — WARFARIN SODIUM 1 MG/1
1 TABLET ORAL
Status: COMPLETED | OUTPATIENT
Start: 2025-07-22 | End: 2025-07-22

## 2025-07-22 RX ADMIN — SODIUM CHLORIDE, PRESERVATIVE FREE 10 ML: 5 INJECTION INTRAVENOUS at 09:01

## 2025-07-22 RX ADMIN — LEVOTHYROXINE SODIUM 50 MCG: 0.05 TABLET ORAL at 09:01

## 2025-07-22 RX ADMIN — SODIUM CHLORIDE, PRESERVATIVE FREE 10 ML: 5 INJECTION INTRAVENOUS at 19:30

## 2025-07-22 RX ADMIN — TORSEMIDE 20 MG: 20 TABLET ORAL at 09:01

## 2025-07-22 RX ADMIN — WARFARIN SODIUM 1 MG: 1 TABLET ORAL at 17:13

## 2025-07-22 RX ADMIN — METOPROLOL SUCCINATE 12.5 MG: 25 TABLET, EXTENDED RELEASE ORAL at 09:01

## 2025-07-22 RX ADMIN — AMIODARONE HYDROCHLORIDE 200 MG: 200 TABLET ORAL at 09:01

## 2025-07-22 RX ADMIN — AMIODARONE HYDROCHLORIDE 200 MG: 200 TABLET ORAL at 19:29

## 2025-07-22 NOTE — PLAN OF CARE
Problem: Chronic Conditions and Co-morbidities  Goal: Patient's chronic conditions and co-morbidity symptoms are monitored and maintained or improved  7/22/2025 1225 by Silvia Vanegas RN  Outcome: Progressing  7/22/2025 0525 by Roxnaa Gipson RN  Outcome: Progressing  Flowsheets (Taken 7/21/2025 2000)  Care Plan - Patient's Chronic Conditions and Co-Morbidity Symptoms are Monitored and Maintained or Improved:   Monitor and assess patient's chronic conditions and comorbid symptoms for stability, deterioration, or improvement   Collaborate with multidisciplinary team to address chronic and comorbid conditions and prevent exacerbation or deterioration   Update acute care plan with appropriate goals if chronic or comorbid symptoms are exacerbated and prevent overall improvement and discharge     Problem: Discharge Planning  Goal: Discharge to home or other facility with appropriate resources  7/22/2025 1225 by Silvia Vanegas RN  Outcome: Progressing  7/22/2025 0525 by Roxana Gipson RN  Outcome: Progressing  Flowsheets (Taken 7/21/2025 2000)  Discharge to home or other facility with appropriate resources:   Identify barriers to discharge with patient and caregiver   Arrange for needed discharge resources and transportation as appropriate   Identify discharge learning needs (meds, wound care, etc)     Problem: ABCDS Injury Assessment  Goal: Absence of physical injury  7/22/2025 1225 by Silvia Vanegas RN  Outcome: Progressing  7/22/2025 0525 by Roxana Gipson RN  Outcome: Progressing     Problem: Safety - Adult  Goal: Free from fall injury  7/22/2025 1225 by Silvia Vanegas RN  Outcome: Progressing  7/22/2025 0525 by Roxana Gipson RN  Outcome: Progressing

## 2025-07-22 NOTE — PROGRESS NOTES
Spiritual Health History and Assessment/Progress Note  [facility]    Spiritual/Emotional Needs,  ,  ,      Name: Tracee Riggins MRN: [id number]    Age: 84 y.o.     Sex: female   Language: English   Yarsanism: Oriental orthodox   Acute on chronic clinical systolic heart failure (HCC)     Date: 7/22/2025            Total Time Calculated: (P) 15 min              Spiritual Assessment began in [facility]        Referral/Consult From: Rounding   Encounter Overview/Reason: Spiritual/Emotional Needs  Service Provided For: Patient    Writer met w/ pt in pt's room. PT shared about the difficulty of adjusting to her illness and her desire to continue to recover and return to some of her normal activities. Pt also shared about her [facility] [employer] [facility]. Pt draws strength from her yenifer and prayer. Pt invited writer to pray with her. Writer provided a ministry of presence, active listening, and offered words of affirmation and prayed with pt.     Yenifer, Belief, Meaning:   Patient identifies as spiritual and is connected with a yenifer tradition or spiritual practice  Family/Friends No family/friends present      Importance and Influence:  Patient has spiritual/personal beliefs that influence decisions regarding their health  Family/Friends No family/friends present    Community:  Patient feels well-supported. Support system includes: Children  Family/Friends No family/friends present    Assessment and Plan of Care:     Patient Interventions include: Facilitated expression of thoughts and feelings, Explored spiritual coping/struggle/distress, Affirmed coping skills/support systems, and Facilitated life review and/ or legacy  Family/Friends Interventions include: No family/friends present    Patient Plan of Care: Spiritual Care available upon further referral  Family/Friends Plan of Care: No future visits per patient/family request    Electronically signed by Chaplain Shelia on 7/22/2025 at 11:26

## 2025-07-22 NOTE — PROGRESS NOTES
Pharmacy Note  Warfarin Consult follow-up      Recent Labs     07/22/25  0522   INR 2.4*     Recent Labs     07/19/25  1152 07/20/25  0426 07/21/25  0454   HGB 13.8 13.4 13.3   HCT 41.9 41.1 40.3   * 145 124*       Target INR range: 2-3    Current warfarin drug-drug interactions:  Amiodarone (New start), levothyroxine, APAP prn, torsemide    Date INR Dose   7/20 2.1 2 mg   7/21 2.6 1 mg   7/22 2.4 1 mg       Notes:                   -INR therapeutic today at 2.4  -Given new start of amiodarone, will lower dose of warfarin to 1 mg today  Daily PT/INR while inpatient.     Brielle Bonilla, PharmD 7/22/2025 8:27 AM

## 2025-07-22 NOTE — CARE COORDINATION
[facility] Quality Flow/Interdisciplinary Rounds Progress Note    Quality Flow Rounds held on July 22, 2025 at 0930    Disciplines Attending:  Bedside Nurse, , and Nursing Unit Leadership    Barriers to Discharge: clinical status     Anticipated Discharge Date:   7/23/25    Anticipated Discharge Disposition: Home     [facility] RISK OF UNPLANNED READMISSION 2.0             18.1 Total Score        Discussed patient goal for the day, patient clinical progression, and barriers to discharge.  PO amiodarone started. Cardio to see patient. Plan is home.       Lorenzo Chan  July 22, 2025

## 2025-07-22 NOTE — PROGRESS NOTES
[facility] Medicine  IN-PATIENT SERVICE   [facility] - Location: [place]    Progress Note    2025    12:28 PM    Name:   Tracee Riggins  MRN:     [id number]     Acct:      [id number]   Room:   [facility]-01   Day:  3  Admit Date:  2025 11:22 AM    PCP:   Lo Medina MD  Code Status:  Full Code    Subjective:     Patient feeling okay, it sounds like either last night or this morning according to patient's experience, her heart rate went up, she did not have any symptoms of shortness of breath or chest pressure or lightheadedness with it.  No coughing fevers or chills.  Getting to bathroom okay    Medications:     Allergies:    Allergies   Allergen Reactions    Amiodarone Nausea And Vomiting       Current Meds:   Scheduled Meds:    warfarin  1 mg Oral Once    amiodarone  200 mg Oral BID    torsemide  20 mg Oral Daily    metoprolol succinate  12.5 mg Oral Daily    sodium chloride flush  5-40 mL IntraVENous 2 times per day    levothyroxine  50 mcg Oral Daily    warfarin placeholder: dosing by pharmacy   Oral RX Placeholder     Continuous Infusions:    dilTIAZem 125 mg in sodium chloride 0.9 % 125 mL infusion Stopped (25 1133)    sodium chloride       PRN Meds: sodium chloride flush, sodium chloride, ondansetron **OR** ondansetron, polyethylene glycol, acetaminophen **OR** acetaminophen    Data:     Vitals:  BP (!) 108/53   Pulse 92   Temp 97.9 °F (36.6 °C) (Oral)   Resp 13   Ht 1.346 m (4' 5\")   Wt 42.6 kg (94 lb)   SpO2 94%   BMI 23.53 kg/m²   Temp (24hrs), Av.9 °F (36.6 °C), Min:97.5 °F (36.4 °C), Max:98.4 °F (36.9 °C)    No results for input(s): \"POCGLU\" in the last 72 hours.    I/O (24Hr):    Intake/Output Summary (Last 24 hours) at 2025 1228  Last data filed at 2025 1804  Gross per 24 hour   Intake --   Output 1000 ml   Net -1000 ml       Labs:  Hematology:  Recent Labs     25  0426 25  0454 25  0522   WBC 4.9 4.6  --    RBC 4.49

## 2025-07-22 NOTE — PROGRESS NOTES
Cardiology Progress Note                       Date:   7/22/2025  Patient name: Tracee Riggins  Date of admission:  7/19/2025 11:22 AM  MRN:   [id number]  YOB: 1940  PCP: Lo Medina MD    Reason for Admission:  atrial fib with rvr, acute on chronic CHF     Subjective:       Patient remains in atrial fibrillation, heart rate is well-controlled this morning, 70s at rest, denies any chest pain or resting dyspnea, on room air  BUN/creatinine is stable    Scheduled Meds:   warfarin  1 mg Oral Once    amiodarone  200 mg Oral BID    torsemide  20 mg Oral Daily    metoprolol succinate  12.5 mg Oral Daily    sodium chloride flush  5-40 mL IntraVENous 2 times per day    levothyroxine  50 mcg Oral Daily    warfarin placeholder: dosing by pharmacy   Oral RX Placeholder       Continuous Infusions:   dilTIAZem 125 mg in sodium chloride 0.9 % 125 mL infusion Stopped (07/21/25 1133)    sodium chloride         Labs:     CBC:   Recent Labs     07/19/25  1152 07/20/25 0426 07/21/25  0454   WBC 6.3 4.9 4.6   HGB 13.8 13.4 13.3   * 145 124*     BMP:    Recent Labs     07/20/25 0426 07/21/25  0454 07/22/25  0522    138 140   K 4.1 3.7 3.9    102 103   CO2 27 27 28   BUN 68* 54* 49*   CREATININE 1.9* 1.5* 1.6*   GLUCOSE 110* 98 89     Hepatic:   Recent Labs     07/20/25  0426 07/22/25  0522   AST 42* 26   ALT 42* 24   BILITOT 0.8 0.7   ALKPHOS 143* 109*     Troponin: No results for input(s): \"TROPONINI\" in the last 72 hours.  BNP: No results for input(s): \"BNP\" in the last 72 hours.  Lipids: No results for input(s): \"CHOL\", \"HDL\" in the last 72 hours.    Invalid input(s): \"LDLCALCU\"  INR:   Recent Labs     07/20/25  0426 07/21/25  0454 07/22/25  0522   INR 2.1* 2.6* 2.4*         Objective:     Vitals: BP (!) 108/53   Pulse 92   Temp 97.9 °F (36.6 °C) (Oral)   Resp 13   Ht 1.346 m (4' 5\")   Wt 42.6 kg (94 lb)   SpO2 94%   BMI 23.53 kg/m²     General appearance: awake,

## 2025-07-22 NOTE — PLAN OF CARE
Problem: Chronic Conditions and Co-morbidities  Goal: Patient's chronic conditions and co-morbidity symptoms are monitored and maintained or improved  Outcome: Progressing  Flowsheets (Taken 7/21/2025 2000)  Care Plan - Patient's Chronic Conditions and Co-Morbidity Symptoms are Monitored and Maintained or Improved:   Monitor and assess patient's chronic conditions and comorbid symptoms for stability, deterioration, or improvement   Collaborate with multidisciplinary team to address chronic and comorbid conditions and prevent exacerbation or deterioration   Update acute care plan with appropriate goals if chronic or comorbid symptoms are exacerbated and prevent overall improvement and discharge     Problem: Discharge Planning  Goal: Discharge to home or other facility with appropriate resources  Outcome: Progressing  Flowsheets (Taken 7/21/2025 2000)  Discharge to home or other facility with appropriate resources:   Identify barriers to discharge with patient and caregiver   Arrange for needed discharge resources and transportation as appropriate   Identify discharge learning needs (meds, wound care, etc)     Problem: ABCDS Injury Assessment  Goal: Absence of physical injury  Outcome: Progressing     Problem: Safety - Adult  Goal: Free from fall injury  Outcome: Progressing

## 2025-07-22 NOTE — PROGRESS NOTES
Physical Therapy  Facility/Department: [address]   Physical Therapy Daily Treatment Note    Patient Name: Tracee Riggins        MRN: [id number]    : 1940    Date of Service: 2025    Chief Complaint   Patient presents with    Extremity Weakness     Patient c/o increased lower extremity weakness and lower leg swelling for about a week now. Patient was at the [employer] and patients family. Patient has hx of afib.     Leg Swelling     Past Medical History:  has a past medical history of Chronic kidney disease, Gout, Gout, Hypertension, Lumbar disc disease, Thrombocytopenia, and Vitamin D deficiency.  Past Surgical History:  has a past surgical history that includes CABG with Mitral Valve Replacement and Cataract removal.    Discharge Recommendations  Discharge Recommendations: Therapy recommended at discharge  PT Equipment Recommendations  Equipment Needed: No  Other: has rollator    Assessment     Assessment: The patient was admitted due to SOB. At baseline, the patient lives with her son and requires no assistance with ADLs and functional mobility. She and her son share IADLs. The patient resides in a 1 story home with a ramped entry. The patient ambulates with no device at baseline. During prior session, the patient was able to perform bed mobility with modified independence. Today pt transfers with mod indep and ambulate 400ft with no device and SBA. She went up/down 10steps w/ R rail SBA.  The patient would benefit from acute PT to improve functional independence back to baseline. The patient would be appropriate to return to prior living arrangements with prior assistance after discharge.     Activity Tolerance  Activity Tolerance: Patient tolerated treatment well  Safety Devices  Type of Devices: All fall risk precautions in place, Call light within reach, Chair alarm in place, Gait belt, Patient at risk for falls, Left in chair, Nurse notified (son in room)    AM-PAC  AM-PAC Basic Mobility -

## 2025-07-23 VITALS
HEART RATE: 116 BPM | TEMPERATURE: 98.1 F | WEIGHT: 94 LBS | HEIGHT: 55 IN | OXYGEN SATURATION: 95 % | BODY MASS INDEX: 21.76 KG/M2 | DIASTOLIC BLOOD PRESSURE: 74 MMHG | SYSTOLIC BLOOD PRESSURE: 103 MMHG | RESPIRATION RATE: 22 BRPM

## 2025-07-23 LAB
ANION GAP SERPL CALCULATED.3IONS-SCNC: 10 MMOL/L (ref 9–16)
BUN SERPL-MCNC: 47 MG/DL (ref 8–23)
CALCIUM SERPL-MCNC: 8.3 MG/DL (ref 8.6–10.4)
CHLORIDE SERPL-SCNC: 100 MMOL/L (ref 98–107)
CO2 SERPL-SCNC: 30 MMOL/L (ref 20–31)
CREAT SERPL-MCNC: 1.5 MG/DL (ref 0.6–0.9)
GFR, ESTIMATED: 34 ML/MIN/1.73M2
GLUCOSE SERPL-MCNC: 91 MG/DL (ref 74–99)
INR PPP: 1.8 (ref 0.9–1.2)
POTASSIUM SERPL-SCNC: 3.7 MMOL/L (ref 3.7–5.3)
PROTHROMBIN TIME: 21.6 SEC (ref 11.8–14.6)
SODIUM SERPL-SCNC: 140 MMOL/L (ref 136–145)

## 2025-07-23 PROCEDURE — 99239 HOSP IP/OBS DSCHRG MGMT >30: CPT | Performed by: FAMILY MEDICINE

## 2025-07-23 PROCEDURE — 6370000000 HC RX 637 (ALT 250 FOR IP): Performed by: INTERNAL MEDICINE

## 2025-07-23 PROCEDURE — 99232 SBSQ HOSP IP/OBS MODERATE 35: CPT | Performed by: INTERNAL MEDICINE

## 2025-07-23 PROCEDURE — 2500000003 HC RX 250 WO HCPCS: Performed by: FAMILY MEDICINE

## 2025-07-23 PROCEDURE — 80048 BASIC METABOLIC PNL TOTAL CA: CPT

## 2025-07-23 PROCEDURE — 6370000000 HC RX 637 (ALT 250 FOR IP): Performed by: FAMILY MEDICINE

## 2025-07-23 PROCEDURE — 85610 PROTHROMBIN TIME: CPT

## 2025-07-23 PROCEDURE — 36415 COLL VENOUS BLD VENIPUNCTURE: CPT

## 2025-07-23 RX ORDER — WARFARIN SODIUM 1 MG/1
2 TABLET ORAL
Status: DISCONTINUED | OUTPATIENT
Start: 2025-07-23 | End: 2025-07-23 | Stop reason: HOSPADM

## 2025-07-23 RX ORDER — POTASSIUM CHLORIDE 1500 MG/1
40 TABLET, EXTENDED RELEASE ORAL ONCE
Status: COMPLETED | OUTPATIENT
Start: 2025-07-23 | End: 2025-07-23

## 2025-07-23 RX ORDER — TORSEMIDE 20 MG/1
20 TABLET ORAL DAILY
Qty: 30 TABLET | Refills: 3 | Status: SHIPPED | OUTPATIENT
Start: 2025-07-24

## 2025-07-23 RX ORDER — AMIODARONE HYDROCHLORIDE 200 MG/1
200 TABLET ORAL 2 TIMES DAILY
Qty: 60 TABLET | Refills: 2 | Status: SHIPPED | OUTPATIENT
Start: 2025-07-23 | End: 2025-10-21

## 2025-07-23 RX ORDER — POTASSIUM CHLORIDE 1500 MG/1
40 TABLET, EXTENDED RELEASE ORAL ONCE
Qty: 60 TABLET | Refills: 3 | Status: SHIPPED | OUTPATIENT
Start: 2025-07-23 | End: 2025-07-28

## 2025-07-23 RX ADMIN — LEVOTHYROXINE SODIUM 50 MCG: 0.05 TABLET ORAL at 08:37

## 2025-07-23 RX ADMIN — TORSEMIDE 20 MG: 20 TABLET ORAL at 08:37

## 2025-07-23 RX ADMIN — SODIUM CHLORIDE, PRESERVATIVE FREE 10 ML: 5 INJECTION INTRAVENOUS at 08:38

## 2025-07-23 RX ADMIN — POTASSIUM CHLORIDE 40 MEQ: 1500 TABLET, EXTENDED RELEASE ORAL at 11:04

## 2025-07-23 RX ADMIN — METOPROLOL SUCCINATE 12.5 MG: 25 TABLET, EXTENDED RELEASE ORAL at 08:37

## 2025-07-23 RX ADMIN — AMIODARONE HYDROCHLORIDE 200 MG: 200 TABLET ORAL at 08:37

## 2025-07-23 NOTE — PROGRESS NOTES
Pharmacy Note  Warfarin Consult follow-up      Recent Labs     07/23/25  0518   INR 1.8*     Recent Labs     07/21/25  0454   HGB 13.3   HCT 40.3   *       Target INR range: 2-3    Current warfarin drug-drug interactions:  Amiodarone (New start), levothyroxine, APAP prn, torsemide    Date INR Dose   7/20 2.1 2 mg   7/21 2.6 1 mg   7/22 2.4 1 mg   7/23 1.8 2 mg       Notes:                   -INR sub-therapeutic today 2.4 to 1.8  -No updated CBC  - Give slightly increased warfarin dose of 2mg x1 today  -Daily PT/INR while inpatient.     Thank you,    JORGE L PHILLIPS [facility]

## 2025-07-23 NOTE — PROGRESS NOTES
Cardiology progress note        Date:  7/23/2025  Patient: Tracee Riggins  Admission:  7/19/2025 11:22 AM  Admit DX: Heart failure (HCC) [I50.9]  Bilateral pleural effusion [J90]  Acute on chronic systolic congestive heart failure (HCC) [I50.23]  Atrial fibrillation with rapid ventricular response (HCC) [I48.91]  Age:  84 y.o., 1940     LOS: 4 days     Reason for evaluation:   Paroxysmal atrial fibrillation, acute on chronic CHF      SUBJECTIVE:     The patient was seen and examined. Notes and labs reviewed.    Patient was started on amiodarone for rhythm control with plans for YELENA and possible electrical cardioversion as an outpatient with Dr. Diaz    OBJECTIVE:      EXAM:  Vitals:    VITALS:  /74   Pulse (!) 116   Temp 98.1 °F (36.7 °C) (Oral)   Resp 22   Ht 1.346 m (4' 5\")   Wt 42.6 kg (94 lb)   SpO2 95%   BMI 23.53 kg/m²    24HR INTAKE/OUTPUT:  No intake or output data in the 24 hours ending 07/23/25 1057    General appearance: awake, alert, no apparent discomfort  HEENT: atraumatic, normocephalic, no JVD, no significant carotid bruit  Lungs: clear to auscultation bilaterally  Heart: irregular rate and rhythm, S1, S2, 2/6 systolic murmur  Abdomen: soft, non-tender; bowel sounds active  Extremities: no significant lower extremity edema  Neurologic: no obvious neurologic deficit    Current Inpatient Medications:   warfarin  2 mg Oral Once    amiodarone  200 mg Oral BID    torsemide  20 mg Oral Daily    metoprolol succinate  12.5 mg Oral Daily    sodium chloride flush  5-40 mL IntraVENous 2 times per day    levothyroxine  50 mcg Oral Daily    warfarin placeholder: dosing by pharmacy   Oral RX Placeholder       IV Infusions (if any):   dilTIAZem 125 mg in sodium chloride 0.9 % 125 mL infusion Stopped (07/21/25 1133)    sodium chloride         Diagnostics:   Telemetry: Atrial fibrillation    EKG 7/19/2025  Atrial flutter 142 bpm, nonspecific ST wave changes  EKG 7/19/2025  Atrial

## 2025-07-23 NOTE — DISCHARGE INSTR - COC
Continuity of Care Form    Patient Name: Tracee Partida   :  1940  MRN:  [id number]    Admit date:  2025  Discharge date:  ***    Code Status Order: Full Code   Advance Directives:     Admitting Physician:  Beverly Ventura DO  PCP: Lo Medina MD    Discharging Nurse: ***  Discharging Hospital Unit/Room#: 343/343-01  Discharging Unit Phone Number: ***    Emergency Contact:   Extended Emergency Contact Information  Primary Emergency Contact: SHAAN PARTIDA  Home Phone: [phone]  Mobile Phone: [phone]  Relation: Child  Secondary Emergency Contact: MELISSA PARTIDA  Home Phone: [phone]  Mobile Phone: [phone]  Relation: Child    Past Surgical History:  Past Surgical History:   Procedure Laterality Date    CABG WITH MITRAL VALVE REPLACEMENT      CATARACT REMOVAL         Immunization History:   Immunization History   Administered Date(s) Administered    Influenza Virus Vaccine 10/24/2013, 2014, 2015    Influenza, FLUZONE High Dose (age 65 y+), IM, Quadv, 0.7mL 2021, 10/13/2021    Pneumococcal, PPSV23, PNEUMOVAX 23, (age 2y+), SC/IM, 0.5mL 2009       Active Problems:  Patient Active Problem List   Diagnosis Code    Paroxysmal atrial fibrillation (HCC) I48.0    Chronic systolic congestive heart failure (HCC) I50.22    SOB (shortness of breath) R06.02    Moderate pulmonary hypertension (HCC) I27.20    Bilateral lower extremity edema R60.0    Stage 3b chronic kidney disease (HCC) N18.32    Essential hypertension I10    COVID-19 U07.1    Encounter for therapeutic drug level monitoring Z51.81    Localized edema R60.0    Long term (current) use of anticoagulants Z79.01    Right upper quadrant pain R10.11    Abdominal pain R10.9    Back pain M54.9    CHF (congestive heart failure) (HCC) I50.9    Heart valve disorder I38    Palpitations R00.2    Renal cyst N28.1    S/P mitral valve replacement with bioprosthetic valve Z95.3    Tachycardia R00.0    Gout M10.9    Lumbar disc  disease M51.9    Vitamin D deficiency E55.9    Thrombocytopenia D69.6    Atrial fibrillation with rapid ventricular response (HCC) I48.91    Acute on chronic clinical systolic heart failure (HCC) I50.23    MARE (acute kidney injury) N17.9    Chronic atrial fibrillation (HCC) I48.20    Heart failure (HCC) I50.9    Bilateral pleural effusion J90    Arterial hypotension I95.9       Isolation/Infection:   Isolation            No Isolation          Patient Infection Status    None to display         Nurse Assessment:  Last Vital Signs: /74   Pulse (!) 116   Temp 98.1 °F (36.7 °C) (Oral)   Resp 22   Ht 1.346 m (4' 5\")   Wt 42.6 kg (94 lb)   SpO2 95%   BMI 23.53 kg/m²     Last documented pain score (0-10 scale):    Last Weight:   Wt Readings from Last 1 Encounters:   07/21/25 42.6 kg (94 lb)     Mental Status:  {IP PT MENTAL STATUS:20030}    IV Access:  {[facility] [place]ESS:690797104}    Nursing Mobility/ADLs:  Walking   {P DME ADLs:906828447}  Transfer  {P DME ADLs:836389435}  Bathing  {P DME ADLs:997915694}  Dressing  {P DME ADLs:755239148}  Toileting  {P DME ADLs:666962247}  Feeding  {P DME ADLs:724291148}  Med Admin  {P DME ADLs:977473886}  Med Delivery   {[facility] MED Delivery:553826479}    Wound Care Documentation and Therapy:        Elimination:  Continence:   Bowel: {YES / NO:19727}  Bladder: {YES / NO:19727}  Urinary Catheter: {Urinary Catheter:659541461}   Colostomy/Ileostomy/Ileal Conduit: {YES / NO:19727}       Date of Last BM: ***  No intake or output data in the 24 hours ending 07/23/25 1055  No intake/output data recorded.    Safety Concerns:     { [facility] Safety Concerns:823336714}    Impairments/Disabilities:      {[facility] Impairments/Disabilities:681061069}    Nutrition Therapy:  Current Nutrition Therapy:   {[facility] Diet List:609759450}    Routes of Feeding: {[facility] DME Other Feedings:027831782}  Liquids: {Slp liquid thickness:77861}  Daily Fluid Restriction: {P DME Yes amt

## 2025-07-23 NOTE — CARE COORDINATION
Hyperopia: Rx was given for corrective spectacles if indicated. [facility] Quality Flow/Interdisciplinary Rounds Progress Note    Quality Flow Rounds held on July 23, 2025 at 0930    Disciplines Attending:  Bedside Nurse, , and Nursing Unit Leadership    Barriers to Discharge: clinical status     Anticipated Discharge Date:   7/23/25    Anticipated Discharge Disposition: Home with son    [facility] RISK OF UNPLANNED READMISSION 2.0             18.2 Total Score        Discussed patient goal for the day, patient clinical progression, and barriers to discharge.  Plan is for discharge today, patient going home with son to transport.       Lorenzo Chan  July 23, 2025

## 2025-07-23 NOTE — PLAN OF CARE
Problem: Chronic Conditions and Co-morbidities  Goal: Patient's chronic conditions and co-morbidity symptoms are monitored and maintained or improved  7/23/2025 1215 by Michelle Avila RN  Outcome: Adequate for Discharge  7/23/2025 1215 by Michelle Avila RN  Outcome: Progressing  Flowsheets (Taken 7/23/2025 0840)  Care Plan - Patient's Chronic Conditions and Co-Morbidity Symptoms are Monitored and Maintained or Improved: Monitor and assess patient's chronic conditions and comorbid symptoms for stability, deterioration, or improvement  7/23/2025 0523 by Roxana Gipson, RN  Outcome: Progressing  Flowsheets (Taken 7/22/2025 2000)  Care Plan - Patient's Chronic Conditions and Co-Morbidity Symptoms are Monitored and Maintained or Improved:   Monitor and assess patient's chronic conditions and comorbid symptoms for stability, deterioration, or improvement   Collaborate with multidisciplinary team to address chronic and comorbid conditions and prevent exacerbation or deterioration   Update acute care plan with appropriate goals if chronic or comorbid symptoms are exacerbated and prevent overall improvement and discharge     Problem: Discharge Planning  Goal: Discharge to home or other facility with appropriate resources  7/23/2025 1215 by Michelle Avila RN  Outcome: Adequate for Discharge  7/23/2025 1215 by Michelle Avila RN  Outcome: Progressing  Flowsheets (Taken 7/23/2025 0840)  Discharge to home or other facility with appropriate resources: Identify barriers to discharge with patient and caregiver  7/23/2025 0523 by Roxana Gipson RN  Outcome: Progressing  Flowsheets (Taken 7/22/2025 2000)  Discharge to home or other facility with appropriate resources:   Identify barriers to discharge with patient and caregiver   Arrange for needed discharge resources and transportation as appropriate   Identify discharge learning needs (meds, wound care, etc)     Problem: ABCDS Injury Assessment  Goal: Absence of physical  injury  7/23/2025 1215 by Michelle Avila RN  Outcome: Adequate for Discharge  7/23/2025 1215 by Michelle Avila RN  Outcome: Progressing  Flowsheets (Taken 7/23/2025 0840)  Absence of Physical Injury: Implement safety measures based on patient assessment  7/23/2025 0523 by Roxana Gipson RN  Outcome: Progressing     Problem: Safety - Adult  Goal: Free from fall injury  7/23/2025 1215 by Michelle Avila RN  Outcome: Adequate for Discharge  7/23/2025 1215 by Michelle Avila RN  Outcome: Progressing  Flowsheets (Taken 7/23/2025 0840)  Free From Fall Injury: Instruct family/caregiver on patient safety  7/23/2025 0523 by Roxana Gipson RN  Outcome: Progressing

## 2025-07-23 NOTE — DISCHARGE SUMMARY
[facility]  IN-PATIENT SERVICE   [facility]    Discharge Summary     Patient ID: Tracee Riggins  :  1940   MRN: [id number]     ACCOUNT:  [id number]   Patient's PCP: Lo Medina MD  Admit Date: 2025   Discharge Date: 2025  Length of Stay: 4  Code Status:  Full Code  Admitting Physician: Beverly Ventura DO  Discharge Physician: Hai Campbell MD     Active Discharge Diagnoses:    Hospital Problem Lists:  Principal Problem:    Acute on chronic clinical systolic heart failure (HCC)  Active Problems:    Moderate pulmonary hypertension (HCC)    Stage 3b chronic kidney disease (HCC)    Long term (current) use of anticoagulants    S/P mitral valve replacement with bioprosthetic valve    Atrial fibrillation with rapid ventricular response (HCC)    MARE (acute kidney injury)    Heart failure (HCC)    Bilateral pleural effusion    Arterial hypotension  Resolved Problems:    * No resolved hospital problems. *      Admission Condition:  fair     Discharged Condition: good    Hospital Stay:    Hospital Course:  Tracee Riggins is a 84 y.o. female who was admitted for the management of Acute on chronic clinical systolic heart failure (HCC) , presented to ER with Extremity Weakness (Patient c/o increased lower extremity weakness and lower leg swelling for about a week now. [place]. Patient has hx of afib. ) and Leg Swelling    Patient feels better today, ready to go home  Physical Exam:  GENERAL APPEARANCE: No acute distress  SKIN: Good turgor  HEENT: Unremarkable  HEART: Irregular rhythm, grade 3/6 systolic murmur right sternal border  LUNGS: Few rales at the bases,  ABDOMEN: Soft no tenderness liver spleen not felt  NEURO: Moves all 4 limbs, oriented x 3  BACK: No significant discomfort  EXTREMITIES: Trace edema, Homans' sign negative  PSYCHOLOGICAL: No anxiety depression or confusion    Significant therapeutic

## 2025-07-23 NOTE — PLAN OF CARE
Problem: Chronic Conditions and Co-morbidities  Goal: Patient's chronic conditions and co-morbidity symptoms are monitored and maintained or improved  Outcome: Progressing  Flowsheets (Taken 7/22/2025 2000)  Care Plan - Patient's Chronic Conditions and Co-Morbidity Symptoms are Monitored and Maintained or Improved:   Monitor and assess patient's chronic conditions and comorbid symptoms for stability, deterioration, or improvement   Collaborate with multidisciplinary team to address chronic and comorbid conditions and prevent exacerbation or deterioration   Update acute care plan with appropriate goals if chronic or comorbid symptoms are exacerbated and prevent overall improvement and discharge     Problem: Discharge Planning  Goal: Discharge to home or other facility with appropriate resources  Outcome: Progressing  Flowsheets (Taken 7/22/2025 2000)  Discharge to home or other facility with appropriate resources:   Identify barriers to discharge with patient and caregiver   Arrange for needed discharge resources and transportation as appropriate   Identify discharge learning needs (meds, wound care, etc)     Problem: ABCDS Injury Assessment  Goal: Absence of physical injury  Outcome: Progressing     Problem: Safety - Adult  Goal: Free from fall injury  Outcome: Progressing

## 2025-07-24 ENCOUNTER — CARE COORDINATION (OUTPATIENT)
Dept: CARE COORDINATION | Age: 85
End: 2025-07-24

## 2025-07-24 ENCOUNTER — TELEPHONE (OUTPATIENT)
Age: 85
End: 2025-07-24

## 2025-07-24 NOTE — TELEPHONE ENCOUNTER
Care Transitions Initial Follow Up Call    Outreach made within 2 business days of discharge: Yes    Patient: Tracee Riggins Patient : 1940   MRN: [id number]  Reason for Admission: Heart palpitations   Discharge Date: 25       Spoke with: patient     Discharge department/facility: [facility]     TCM Interactive Patient Contact:  Was patient able to fill all prescriptions: Yes  Was patient instructed to bring all medications to the follow-up visit: Yes  Is patient taking all medications as directed in the discharge summary? Yes  Does patient understand their discharge instructions: Yes  Does patient have questions or concerns that need addressed prior to 7-14 day follow up office visit: no    Additional needs identified to be addressed with provider  No needs identified             Scheduled appointment with PCP within 7-14 days    Follow Up  Future Appointments   Date Time Provider Department Center   2025  9:40 AM STVZ PB MEDICATION MGMT PB MED [facility]   2025  3:40 PM [facility]   2025 12:15 PM Ayde Diaz MD [facility]   2025 10:00 AM Lo Medina [facility]   12/10/2025 10:10 AM Deb Reza APRN - CNP AFL Neph Jero None       Larissa Fong MA

## 2025-07-24 NOTE — CARE COORDINATION
Care Transitions Note    Initial Call - Call within 2 business days of discharge: Yes    Attempted to reach patient for transitions of care follow up. Unable to reach patient.    Outreach Attempts:   HIPAA compliant voicemail left for patient.     Patient: Tracee Riggins    Patient : 1940   MRN: [id number]    Reason for Admission: Atrial fibrillation with rapid ventricular response (HCC  Discharge Date: 25  RURS: Readmission Risk Score: 17.7    Last Discharge Facility       Date Complaint Diagnosis Description Type Department Provider    25 Extremity Weakness; Leg Swelling Atrial fibrillation with rapid ventricular response ([facility]) ... ED to Hosp-Admission (Discharged) (ADMITTED) [facility] 3 [facility] Beverly Ventura, DO; [place]...            Was this an external facility discharge? No    Follow Up Appointment:   Patient does not have a follow up appointment scheduled at time of call.    Future Appointments         Provider Specialty Dept Phone    2025 9:40 AM VZ PB MEDICATION MGMT Pharmacy [phone]    2025 12:15 PM Ayde Diaz MD Cardiology [phone]    2025 10:00 AM Lo Medina MD Primary Care [phone]    12/10/2025 10:10 AM Deb Reza APRN - CNP Nephrology [phone]            Plan for follow-up on next business day. 2nd attempt 24 hr HFU call     Kimberly Zuniga RN

## 2025-07-25 ENCOUNTER — CARE COORDINATION (OUTPATIENT)
Dept: CARE COORDINATION | Age: 85
End: 2025-07-25

## 2025-07-25 ENCOUNTER — ANTI-COAG VISIT (OUTPATIENT)
Age: 85
End: 2025-07-25
Payer: MEDICARE

## 2025-07-25 DIAGNOSIS — I48.0 PAROXYSMAL ATRIAL FIBRILLATION (HCC): Primary | ICD-10-CM

## 2025-07-25 DIAGNOSIS — Z95.3 S/P MITRAL VALVE REPLACEMENT WITH BIOPROSTHETIC VALVE: ICD-10-CM

## 2025-07-25 DIAGNOSIS — I48.20 CHRONIC ATRIAL FIBRILLATION (HCC): Primary | ICD-10-CM

## 2025-07-25 LAB
INTERNATIONAL NORMALIZATION RATIO, POC: 1.8
PROTHROMBIN TIME, POC: 21.2

## 2025-07-25 PROCEDURE — 1111F DSCHRG MED/CURRENT MED MERGE: CPT

## 2025-07-25 PROCEDURE — 99213 OFFICE O/P EST LOW 20 MIN: CPT

## 2025-07-25 PROCEDURE — 85610 PROTHROMBIN TIME: CPT

## 2025-07-25 NOTE — PROGRESS NOTES
Medication Management Service, Warfarin Management  [facility] Medication Management, [phone]  Visit Date: 7/25/2025   Subjective:   Tracee Riggins is a 84 y.o. female who presents to clinic today for anticoagulation monitoring and adjustment.    Patient was referred for warfarin management due to  Indication:    S/P mitral valve replacement with bioprosthetic valve, atrial fibrillation   INR goal: of 2-3  Duration of therapy: indefinite.    Patient reports the following:   Adherent with regimen:  Yes  Missed or extra doses:  patient was given one reduced dose in the hospital 7/22 compared with regimen advised in last visit     Bleeding or thromboembolic side effects: bruising on arm from multiple IV attempts in the hospital, she states the bruising is improving, no bleeding    Significant medication, dietary, alcohol, or tobacco changes:    Jardiance, Lasix stopped  Amiodarone, torsemide, potassium chloride started  Most notable in these changes are the addition of amiodarone and torsemide, both having potential to increase INR, most significantly amiodarone    Amiodarone was started inpatient 7/21 and continued on discharge    Significant recent illness, disease state changes, or hospitalization:  hospitalization 7/19-7/23 for heart failure    Upcoming surgeries or procedures:  None           Assessment and PLAN   PT/INR done in office per protocol.     INR today is 1.8, slightly subtherapeutic.    Plan:  proceed with maintenance dose of warfarin 1 mg MWF and 2 mg all other days. INR is slightly low, however patient now on amiodarone as noted above. Patient's dose had recently been lowered more than once due to somewhat unexplained elevated INR. I suspect now that those elevations may have been due to exacerbation of heart failure. This dose is reduced from her recent maintenance doses. Discussed with patient that we will closely monitor INR due to recently starting amiodarone.  Using warfarin

## 2025-07-25 NOTE — CARE COORDINATION
Care Transitions Note    Initial Call - Call within 2 business days of discharge: Yes    Patient Current Location:  Home: [address] [place] [address]    Care Transition Nurse contacted the patient by telephone to perform post hospital discharge assessment, verified name and  as identifiers.  Provided introduction to self, and explanation of the Care Transition Nurse role.    Patient: Tracee Riggins    Patient : 1940   MRN: [id number]    Reason for Admission: Atrial fibrillation with rapid ventricular response (HCC)   Discharge Date: 25  RURS: Readmission Risk Score: 17.7      Last Discharge Facility       Date Complaint Diagnosis Description Type Department Provider    25 Extremity Weakness; Leg Swelling Atrial fibrillation with rapid ventricular response (HCC) ... ED to Hosp-Admission (Discharged) (ADMITTED) [facility] 3 Beverly Lala, DO; [place]...            Was this an external facility discharge? No    Additional needs identified to be addressed with provider      Please discuss outpatient PT with patient / c/o weakness also discuss RPM with patient --ty//JU           Method of communication with provider: .    Patients top risk factors for readmission: functional physical ability and medication management    Interventions to address risk factors:   Review of patient management of conditions/medications: reviewed discharge instructions and medications, 1111F  Home Health: no vns   Referrals: no rpm ---sent referral to RD  Communication with providers: sent message to pcp    Care Summary Note: Writer spoke to patient, she is doing ok, denied any c/o fever, chills, n/v/d sob or chest pain, taking medications as ordered, encouraged to keep track of her BP and HR , patient v/u discussed RPM, wants to talk to doctor, also discussed VNS, she would like to go to out patient PT, explained role of CTN provided contact information, will follow//JU    Care Transition Nurse reviewed

## 2025-07-28 ENCOUNTER — OFFICE VISIT (OUTPATIENT)
Age: 85
End: 2025-07-28

## 2025-07-28 ENCOUNTER — CARE COORDINATION (OUTPATIENT)
Dept: CARE COORDINATION | Age: 85
End: 2025-07-28

## 2025-07-28 VITALS
WEIGHT: 89.4 LBS | OXYGEN SATURATION: 100 % | HEART RATE: 72 BPM | SYSTOLIC BLOOD PRESSURE: 126 MMHG | TEMPERATURE: 98.4 F | DIASTOLIC BLOOD PRESSURE: 76 MMHG | BODY MASS INDEX: 22.38 KG/M2

## 2025-07-28 DIAGNOSIS — I50.22 CHRONIC SYSTOLIC CONGESTIVE HEART FAILURE (HCC): ICD-10-CM

## 2025-07-28 DIAGNOSIS — Z79.899 ON POTASSIUM WASTING DIURETIC THERAPY: ICD-10-CM

## 2025-07-28 DIAGNOSIS — I48.0 PAROXYSMAL ATRIAL FIBRILLATION (HCC): ICD-10-CM

## 2025-07-28 DIAGNOSIS — I27.20 MODERATE PULMONARY HYPERTENSION (HCC): Primary | ICD-10-CM

## 2025-07-28 RX ORDER — POTASSIUM CHLORIDE 750 MG/1
10 TABLET, EXTENDED RELEASE ORAL DAILY
Qty: 60 TABLET | Refills: 3 | Status: SHIPPED | OUTPATIENT
Start: 2025-07-28

## 2025-07-28 RX ORDER — WARFARIN SODIUM 1 MG/1
2 TABLET ORAL DAILY
Qty: 30 TABLET | Refills: 3 | Status: SHIPPED | OUTPATIENT
Start: 2025-07-28

## 2025-07-28 NOTE — PROGRESS NOTES
[facility]  [facility] MEDICINE  [address]  [facility] [address]  Dept: [phone]     Date of Visit:  2025  Patient Name: Tracee Riggins   Patient :  1940     CHIEF COMPLAINT/HPI:     Chief Complaint   Patient presents with    Follow-Up from Hospital     Summary in chart from . Wondering if she can take her magnesium supplement again.        HPI      Tracee Riggins, 84 y.o. presents today for transitional care management. She was admitted to [facility] on 25 for acute on chronic systolic heart failure after presenting to the ED with extremity weakness and edema. She does have a history of atrial fibrillation. Patient was called on 25 for transitional care management.     Tracee Riggins denies headaches, dizziness, chest pain, shortness of breath, heartburn/indigestion, nausea, vomiting, diarrhea, constipation, blood in stool, claudication, edema, skin changes, vision changes, fever, or chills.       REVIEW OF SYSTEM      Review of Systems:   Constitutional:  Negative for chills, fatigue, fever and unexpected weight change.   Eyes:  Negative for visual disturbance.   Respiratory:  Negative for cough, chest tightness, shortness of breath and wheezing.    Cardiovascular:  Negative for chest pain, palpitations and leg swelling.   Gastrointestinal:  Negative for abdominal distention, abdominal pain, blood in stool, constipation, diarrhea, nausea and vomiting.   Genitourinary:  Negative for dysuria, hematuria and urgency.   Musculoskeletal:  Negative for back pain, neck pain and neck stiffness.   Skin:  Negative for rash and wound.   Neurological:  Negative for syncope, weakness, light-headedness and headaches.   Hematological:  Negative for adenopathy. Does not bruise/bleed easily.   Psychiatric/Behavioral:  Negative for suicidal ideas. The patient is not nervous/anxious.      REVIEWED INFORMATION      Allergies   Allergen

## 2025-07-28 NOTE — CARE COORDINATION
Referral from CTN, Kimberly Zuniga RN-  Jose Johnson, please follow up with patient, she has questions in regards to heart healthy diet, foods higher in potassium,   ty//JU     Will reach out to patient for consult.  MELECIO lBackman  
warfarin (COUMADIN) 1 MG tablet Take 2 tablets by mouth daily 1 to 2 mg daily  3     No current facility-administered medications for this visit.         Visit for:  Obesity/Weight loss  Diabetes:  Hypertension:  Hyperlipidemia:  Other:CHF     Anthropometric Measurements:  HT:4'5  Weight:94  IBW:85 + or - 10%  BMI:23    Biochemical Data, Medical Tests and Procedures:    No results found for: \"LABA1C\"  No results found for: \"EAG\"    Lab Results   Component Value Date    CHOL 152 09/28/2024    CHOL 201 (H) 09/28/2023     Lab Results   Component Value Date    TRIG 90 09/28/2024    TRIG 148 09/28/2023     Lab Results   Component Value Date    HDL 39 (L) 09/28/2024    HDL 68 09/28/2023     No components found for: \"LDLCALC\", \"LDLCHOLESTEROL\"  Lab Results   Component Value Date    VLDL 18 09/28/2024     Lab Results   Component Value Date    CHOLHDLRATIO 4.0 09/28/2024    CHOLHDLRATIO 3.0 09/28/2023       Lab Results   Component Value Date    WBC 4.6 07/21/2025    HGB 13.3 07/21/2025    HCT 40.3 07/21/2025    MCV 90.8 07/21/2025     (L) 07/21/2025       Lab Results   Component Value Date    CREATININE 1.5 (H) 07/23/2025    BUN 47 (H) 07/23/2025     07/23/2025    K 3.7 07/23/2025     07/23/2025    CO2 30 07/23/2025         NUTRITION DIAGNOSIS    #1 Problem  Food and nutrition-related knowledge deficit       Etiology  related to lack of prior nutrition related education       Signs/Symptoms  as evidenced by referral for nutrition education for heart healthy diet    NUTRITION INTERVENTION  Nutrition Prescription:    cardiac diet    Calorie needs:1068cals/d  Protein needs:43gms/d      Patient Goals:  1.Discussed DASH guidelines- lowfat/cholesterol and low sodium. Reviewed reading food labels-what to look for on labels- focused on limiting sodium intake.  2. Discussed avoiding canned, prepackaged foods, processed meats and cheese. Patient states does not eat canned soup or canned vegetables.  Discussed

## 2025-07-28 NOTE — PATIENT INSTRUCTIONS
Tracee    Thank you for choosing [facility].  We know you have options when it comes to your healthcare; we appreciate that you chose us. Our goal is to provide exceptional service and world class care to every patient.  You will be receiving a survey via email or text message asking for your feedback.  Please take a few minutes to share your thoughts about your recent visit. Your comments help us understand what we do well and ways we can improve.  Thank you in advance for your valuable feedback.      Dr. Adrian Encinas MA

## 2025-07-31 ENCOUNTER — ANTI-COAG VISIT (OUTPATIENT)
Age: 85
End: 2025-07-31
Payer: MEDICARE

## 2025-07-31 DIAGNOSIS — Z95.3 S/P MITRAL VALVE REPLACEMENT WITH BIOPROSTHETIC VALVE: ICD-10-CM

## 2025-07-31 DIAGNOSIS — I48.0 PAROXYSMAL ATRIAL FIBRILLATION (HCC): Primary | ICD-10-CM

## 2025-07-31 LAB
INTERNATIONAL NORMALIZATION RATIO, POC: 2.5
PROTHROMBIN TIME, POC: 29.7

## 2025-07-31 PROCEDURE — 85610 PROTHROMBIN TIME: CPT

## 2025-07-31 PROCEDURE — 99212 OFFICE O/P EST SF 10 MIN: CPT

## 2025-07-31 NOTE — PROGRESS NOTES
Medication Management Service, Warfarin Management  [facility] Medication Management, [phone]  Visit Date: 2025   Subjective:   Tracee Riggins is a 84 y.o. female who presents to clinic today for anticoagulation monitoring and adjustment.    Patient was referred for warfarin management due to  Indication:    S/P mitral valve replacement with bioprosthetic valve, atrial fibrillation   INR goal: of 2-3  Duration of therapy: indefinite.    Patient reports the following:   Adherent with regimen:  Yes  Missed or extra doses:  None   Bleeding or thromboembolic side effects:  None    Significant medication, dietary, alcohol, or tobacco changes:  continues on amiodarone and torsemide as noted in last visit    Significant recent illness, disease state changes, or hospitalization:  None  Upcoming surgeries or procedures:  None           Assessment and PLAN   PT/INR done in office per protocol.     INR today is 2.5, therapeutic.    Plan:  Instructed the patient to start a decreased warfarin regimen of 2 mg Sun, Tues, Thur, and 1 mg on all other days.  Using warfarin 1 mg tablets. This dosing represents a conservative empiric dose decrease due to patient starting amiodarone about 1.5 weeks ago, recognizing amiodarone would not yet be at steady state. Will closely monitor INR at this time, patient aware of rationale.    Recheck INR in 1 week(s).     Patient verbalized understanding of dosing directions and information discussed. Dosing schedule given to patient. Progress note sent to referring office.  Patient acknowledges working in consult agreement with pharmacist as referred by his/her physician.      Electronically signed by Ashley Wallis RPH on 25 at 7:39 AM EDT    For Pharmacy Admin Tracking Only    Intervention Detail: Adherence Monitorin and Dose Adjustment: 1, reason: Therapy Optimization  Total # of Interventions Recommended: 2  Total # of Interventions Accepted: 2  Time Spent

## 2025-08-01 ENCOUNTER — CARE COORDINATION (OUTPATIENT)
Dept: CARE COORDINATION | Age: 85
End: 2025-08-01

## 2025-08-01 NOTE — CARE COORDINATION
Care Transitions Note    Follow Up Call     Patient Current Location:  Home: [address] [facility]    [facility] Care Coordinator contacted the patient by telephone. Verified name and  as identifiers.    Additional needs identified to be addressed with provider   No needs identified                 Method of communication with provider: none.    Care Summary Note: Writer spoke with Tracee for a follow up care transitions call. She states she is doing okay. She is regaining some pf her strength and is not feeling as fatigued  and weak as she was previously. She states she has been able to do small housekeeping tasks and cook again without difficulty. She states she is not having any edema,SOB or chest pain. She has completed her PCP follow up and will be repeating labs and CXR in a few weeks. She has been checking her BP and pulse and states both have been WNL. She has decided against RPM at this time. She did request that a high potassium food list be mailed to her-she was supposed to get one at her PCP appointment but never received it. Will have Tamara  send her out a food list. Tracee thanked writer for calling and is agreeable to a follow up call next week.     Plan of care updates since last contact:  Review of patient management of conditions/medications:         Advance Care Planning:   Does patient have an Advance Directive: reviewed and current.    Medication Review:  No changes since last call.     Remote Patient Monitoring:  Offered patient enrollment in the Remote Patient Monitoring (RPM) program for in-home monitoring: Yes, but did not enroll at this time: declined to enroll in the program becausemonitoring at home. .    Assessments:  Care Transitions Subsequent and Final Call    Subsequent and Final Calls  Do you have any ongoing symptoms?: No  Have your medications changed?: No  Do you have any questions related to your medications?: No  Do you currently have any active services?:

## 2025-08-06 ENCOUNTER — CARE COORDINATION (OUTPATIENT)
Dept: CARE COORDINATION | Age: 85
End: 2025-08-06

## 2025-08-07 ENCOUNTER — ANTI-COAG VISIT (OUTPATIENT)
Age: 85
End: 2025-08-07
Payer: MEDICARE

## 2025-08-07 DIAGNOSIS — Z95.3 S/P MITRAL VALVE REPLACEMENT WITH BIOPROSTHETIC VALVE: ICD-10-CM

## 2025-08-07 DIAGNOSIS — I48.0 PAROXYSMAL ATRIAL FIBRILLATION (HCC): Primary | ICD-10-CM

## 2025-08-07 LAB
INTERNATIONAL NORMALIZATION RATIO, POC: 3.7
PROTHROMBIN TIME, POC: 44.7

## 2025-08-07 PROCEDURE — 99213 OFFICE O/P EST LOW 20 MIN: CPT

## 2025-08-07 PROCEDURE — 85610 PROTHROMBIN TIME: CPT

## 2025-08-08 ENCOUNTER — CARE COORDINATION (OUTPATIENT)
Dept: CARE COORDINATION | Age: 85
End: 2025-08-08

## 2025-08-13 ENCOUNTER — CARE COORDINATION (OUTPATIENT)
Dept: CARE COORDINATION | Age: 85
End: 2025-08-13

## 2025-08-15 ENCOUNTER — ANTI-COAG VISIT (OUTPATIENT)
Age: 85
End: 2025-08-15
Payer: MEDICARE

## 2025-08-15 ENCOUNTER — CARE COORDINATION (OUTPATIENT)
Dept: CASE MANAGEMENT | Age: 85
End: 2025-08-15

## 2025-08-15 VITALS — DIASTOLIC BLOOD PRESSURE: 80 MMHG | SYSTOLIC BLOOD PRESSURE: 150 MMHG

## 2025-08-15 DIAGNOSIS — Z95.3 S/P MITRAL VALVE REPLACEMENT WITH BIOPROSTHETIC VALVE: ICD-10-CM

## 2025-08-15 DIAGNOSIS — I48.0 PAROXYSMAL ATRIAL FIBRILLATION (HCC): Primary | ICD-10-CM

## 2025-08-15 LAB
INTERNATIONAL NORMALIZATION RATIO, POC: 4.7
PROTHROMBIN TIME, POC: 56.2

## 2025-08-15 PROCEDURE — 85610 PROTHROMBIN TIME: CPT

## 2025-08-15 PROCEDURE — 99213 OFFICE O/P EST LOW 20 MIN: CPT

## 2025-08-21 ENCOUNTER — ANTI-COAG VISIT (OUTPATIENT)
Age: 85
End: 2025-08-21
Payer: MEDICARE

## 2025-08-21 ENCOUNTER — HOSPITAL ENCOUNTER (OUTPATIENT)
Dept: GENERAL RADIOLOGY | Age: 85
Discharge: HOME | End: 2025-08-23
Payer: MEDICARE

## 2025-08-21 ENCOUNTER — HOSPITAL ENCOUNTER (OUTPATIENT)
Age: 85
Setting detail: SPECIMEN
Discharge: HOME | End: 2025-08-21

## 2025-08-21 DIAGNOSIS — Z95.3 S/P MITRAL VALVE REPLACEMENT WITH BIOPROSTHETIC VALVE: ICD-10-CM

## 2025-08-21 DIAGNOSIS — I48.0 PAROXYSMAL ATRIAL FIBRILLATION (HCC): Primary | ICD-10-CM

## 2025-08-21 DIAGNOSIS — Z79.899 ON POTASSIUM WASTING DIURETIC THERAPY: ICD-10-CM

## 2025-08-21 DIAGNOSIS — I48.0 PAROXYSMAL ATRIAL FIBRILLATION (HCC): ICD-10-CM

## 2025-08-21 DIAGNOSIS — I50.22 CHRONIC SYSTOLIC CONGESTIVE HEART FAILURE (HCC): ICD-10-CM

## 2025-08-21 LAB
ALBUMIN SERPL-MCNC: 4 G/DL (ref 3.5–5.2)
ALBUMIN/GLOB SERPL: 1.5 {RATIO} (ref 1–2.5)
ALP SERPL-CCNC: 119 U/L (ref 35–104)
ALT SERPL-CCNC: 20 U/L (ref 10–35)
ANION GAP SERPL CALCULATED.3IONS-SCNC: 13 MMOL/L (ref 9–16)
AST SERPL-CCNC: 29 U/L (ref 10–35)
BILIRUB SERPL-MCNC: 0.7 MG/DL (ref 0–1.2)
BNP SERPL-MCNC: 2947 PG/ML (ref 0–450)
BUN SERPL-MCNC: 54 MG/DL (ref 8–23)
CALCIUM SERPL-MCNC: 9.2 MG/DL (ref 8.6–10.4)
CHLORIDE SERPL-SCNC: 95 MMOL/L (ref 98–107)
CO2 SERPL-SCNC: 30 MMOL/L (ref 20–31)
CREAT SERPL-MCNC: 2.1 MG/DL (ref 0.6–0.9)
GFR, ESTIMATED: 23 ML/MIN/1.73M2
GLUCOSE SERPL-MCNC: 115 MG/DL (ref 74–99)
INTERNATIONAL NORMALIZATION RATIO, POC: 4.1
POTASSIUM SERPL-SCNC: 4.7 MMOL/L (ref 3.7–5.3)
PROT SERPL-MCNC: 6.6 G/DL (ref 6.6–8.7)
PROTHROMBIN TIME, POC: 49.2
SODIUM SERPL-SCNC: 138 MMOL/L (ref 136–145)

## 2025-08-21 PROCEDURE — 99213 OFFICE O/P EST LOW 20 MIN: CPT

## 2025-08-21 PROCEDURE — 85610 PROTHROMBIN TIME: CPT

## 2025-08-21 PROCEDURE — 71046 X-RAY EXAM CHEST 2 VIEWS: CPT

## 2025-08-26 ENCOUNTER — OFFICE VISIT (OUTPATIENT)
Age: 85
End: 2025-08-26
Payer: MEDICARE

## 2025-08-26 ENCOUNTER — CARE COORDINATION (OUTPATIENT)
Dept: CARE COORDINATION | Age: 85
End: 2025-08-26

## 2025-08-26 VITALS
BODY MASS INDEX: 20.37 KG/M2 | SYSTOLIC BLOOD PRESSURE: 158 MMHG | DIASTOLIC BLOOD PRESSURE: 82 MMHG | HEART RATE: 76 BPM | WEIGHT: 88 LBS | HEIGHT: 55 IN | OXYGEN SATURATION: 98 %

## 2025-08-26 DIAGNOSIS — I48.0 PAROXYSMAL ATRIAL FIBRILLATION (HCC): Primary | ICD-10-CM

## 2025-08-26 PROCEDURE — 1123F ACP DISCUSS/DSCN MKR DOCD: CPT | Performed by: INTERNAL MEDICINE

## 2025-08-26 PROCEDURE — 1159F MED LIST DOCD IN RCRD: CPT | Performed by: INTERNAL MEDICINE

## 2025-08-26 PROCEDURE — 1090F PRES/ABSN URINE INCON ASSESS: CPT | Performed by: INTERNAL MEDICINE

## 2025-08-26 PROCEDURE — 1036F TOBACCO NON-USER: CPT | Performed by: INTERNAL MEDICINE

## 2025-08-26 PROCEDURE — 99214 OFFICE O/P EST MOD 30 MIN: CPT | Performed by: INTERNAL MEDICINE

## 2025-08-26 PROCEDURE — G8420 CALC BMI NORM PARAMETERS: HCPCS | Performed by: INTERNAL MEDICINE

## 2025-08-26 PROCEDURE — 93000 ELECTROCARDIOGRAM COMPLETE: CPT | Performed by: INTERNAL MEDICINE

## 2025-08-26 PROCEDURE — G8427 DOCREV CUR MEDS BY ELIG CLIN: HCPCS | Performed by: INTERNAL MEDICINE

## 2025-08-26 RX ORDER — AMIODARONE HYDROCHLORIDE 200 MG/1
200 TABLET ORAL DAILY
Qty: 90 TABLET | Refills: 2 | Status: SHIPPED | OUTPATIENT
Start: 2025-08-26 | End: 2026-05-23

## 2025-08-26 ASSESSMENT — ENCOUNTER SYMPTOMS
SHORTNESS OF BREATH: 0
CHEST TIGHTNESS: 0

## 2025-08-27 ENCOUNTER — CARE COORDINATION (OUTPATIENT)
Dept: CARE COORDINATION | Age: 85
End: 2025-08-27

## 2025-08-28 ENCOUNTER — CARE COORDINATION (OUTPATIENT)
Dept: CARE COORDINATION | Age: 85
End: 2025-08-28

## 2025-08-29 ENCOUNTER — ANTI-COAG VISIT (OUTPATIENT)
Age: 85
End: 2025-08-29
Payer: MEDICARE

## 2025-08-29 DIAGNOSIS — Z95.3 S/P MITRAL VALVE REPLACEMENT WITH BIOPROSTHETIC VALVE: ICD-10-CM

## 2025-08-29 DIAGNOSIS — I48.0 PAROXYSMAL ATRIAL FIBRILLATION (HCC): Primary | ICD-10-CM

## 2025-08-29 LAB
INTERNATIONAL NORMALIZATION RATIO, POC: 3.5
PROTHROMBIN TIME, POC: 42.4

## 2025-08-29 PROCEDURE — 85610 PROTHROMBIN TIME: CPT

## 2025-08-29 PROCEDURE — 99212 OFFICE O/P EST SF 10 MIN: CPT

## 2025-09-04 ENCOUNTER — CARE COORDINATION (OUTPATIENT)
Dept: CARE COORDINATION | Age: 85
End: 2025-09-04